# Patient Record
Sex: MALE | Race: WHITE | NOT HISPANIC OR LATINO | Employment: OTHER | URBAN - METROPOLITAN AREA
[De-identification: names, ages, dates, MRNs, and addresses within clinical notes are randomized per-mention and may not be internally consistent; named-entity substitution may affect disease eponyms.]

---

## 2023-07-28 LAB
ALANINE AMINOTRANSFERASE (SGPT) (U/L) IN SER/PLAS: 17 U/L (ref 10–52)
ALBUMIN (G/DL) IN SER/PLAS: 4.3 G/DL (ref 3.4–5)
ALKALINE PHOSPHATASE (U/L) IN SER/PLAS: 89 U/L (ref 33–136)
AMYLASE (U/L) IN SER/PLAS: 60 U/L (ref 29–103)
ANION GAP IN SER/PLAS: 18 MMOL/L (ref 10–20)
ASPARTATE AMINOTRANSFERASE (SGOT) (U/L) IN SER/PLAS: 20 U/L (ref 9–39)
BASOPHILS (10*3/UL) IN BLOOD BY AUTOMATED COUNT: 0.11 X10E9/L (ref 0–0.1)
BASOPHILS/100 LEUKOCYTES IN BLOOD BY AUTOMATED COUNT: 0.8 % (ref 0–2)
BILIRUBIN TOTAL (MG/DL) IN SER/PLAS: 0.7 MG/DL (ref 0–1.2)
CALCIDIOL (25 OH VITAMIN D3) (NG/ML) IN SER/PLAS: 50 NG/ML
CALCIUM (MG/DL) IN SER/PLAS: 9.3 MG/DL (ref 8.6–10.6)
CARBON DIOXIDE, TOTAL (MMOL/L) IN SER/PLAS: 24 MMOL/L (ref 21–32)
CHLORIDE (MMOL/L) IN SER/PLAS: 102 MMOL/L (ref 98–107)
CHOLESTEROL (MG/DL) IN SER/PLAS: 180 MG/DL (ref 0–199)
CHOLESTEROL IN HDL (MG/DL) IN SER/PLAS: 38.1 MG/DL
CHOLESTEROL/HDL RATIO: 4.7
CREATINE KINASE (U/L) IN SER/PLAS: 82 U/L (ref 0–325)
CREATININE (MG/DL) IN SER/PLAS: 1.99 MG/DL (ref 0.5–1.3)
EOSINOPHILS (10*3/UL) IN BLOOD BY AUTOMATED COUNT: 0.37 X10E9/L (ref 0–0.4)
EOSINOPHILS/100 LEUKOCYTES IN BLOOD BY AUTOMATED COUNT: 2.6 % (ref 0–6)
ERYTHROCYTE DISTRIBUTION WIDTH (RATIO) BY AUTOMATED COUNT: 13 % (ref 11.5–14.5)
ERYTHROCYTE MEAN CORPUSCULAR HEMOGLOBIN CONCENTRATION (G/DL) BY AUTOMATED: 33.3 G/DL (ref 32–36)
ERYTHROCYTE MEAN CORPUSCULAR VOLUME (FL) BY AUTOMATED COUNT: 97 FL (ref 80–100)
ERYTHROCYTES (10*6/UL) IN BLOOD BY AUTOMATED COUNT: 4.44 X10E12/L (ref 4.5–5.9)
ESTIMATED AVERAGE GLUCOSE FOR HBA1C: 177 MG/DL
GAMMA GLUTAMYL TRANSFERASE (U/L) IN SER/PLAS: 29 U/L (ref 5–64)
GFR MALE: 33 ML/MIN/1.73M2
GLUCOSE (MG/DL) IN SER/PLAS: 211 MG/DL (ref 74–99)
HEMATOCRIT (%) IN BLOOD BY AUTOMATED COUNT: 43 % (ref 41–52)
HEMOGLOBIN (G/DL) IN BLOOD: 14.3 G/DL (ref 13.5–17.5)
HEMOGLOBIN A1C/HEMOGLOBIN TOTAL IN BLOOD: 7.8 %
IMMATURE GRANULOCYTES/100 LEUKOCYTES IN BLOOD BY AUTOMATED COUNT: 0.3 % (ref 0–0.9)
LDL: 95 MG/DL (ref 0–99)
LEUKOCYTES (10*3/UL) IN BLOOD BY AUTOMATED COUNT: 14.5 X10E9/L (ref 4.4–11.3)
LIPASE (U/L) IN SER/PLAS: 55 U/L (ref 9–82)
LYMPHOCYTES (10*3/UL) IN BLOOD BY AUTOMATED COUNT: 6.3 X10E9/L (ref 0.8–3)
LYMPHOCYTES/100 LEUKOCYTES IN BLOOD BY AUTOMATED COUNT: 43.5 % (ref 13–44)
MONOCYTES (10*3/UL) IN BLOOD BY AUTOMATED COUNT: 0.97 X10E9/L (ref 0.05–0.8)
MONOCYTES/100 LEUKOCYTES IN BLOOD BY AUTOMATED COUNT: 6.7 % (ref 2–10)
NEUTROPHILS (10*3/UL) IN BLOOD BY AUTOMATED COUNT: 6.68 X10E9/L (ref 1.6–5.5)
NEUTROPHILS/100 LEUKOCYTES IN BLOOD BY AUTOMATED COUNT: 46.1 % (ref 40–80)
NON HDL CHOLESTEROL: 142 MG/DL
NRBC (PER 100 WBCS) BY AUTOMATED COUNT: 0 /100 WBC (ref 0–0)
PLATELETS (10*3/UL) IN BLOOD AUTOMATED COUNT: 229 X10E9/L (ref 150–450)
POTASSIUM (MMOL/L) IN SER/PLAS: 4.1 MMOL/L (ref 3.5–5.3)
PROTEIN TOTAL: 7.3 G/DL (ref 6.4–8.2)
SODIUM (MMOL/L) IN SER/PLAS: 140 MMOL/L (ref 136–145)
THYROTROPIN (MIU/L) IN SER/PLAS BY DETECTION LIMIT <= 0.05 MIU/L: 3.84 MIU/L (ref 0.44–3.98)
THYROXINE (T4) FREE (NG/DL) IN SER/PLAS: 1.6 NG/DL (ref 0.78–1.48)
TRIGLYCERIDE (MG/DL) IN SER/PLAS: 236 MG/DL (ref 0–149)
UREA NITROGEN (MG/DL) IN SER/PLAS: 33 MG/DL (ref 6–23)
VLDL: 47 MG/DL (ref 0–40)

## 2023-08-16 LAB
ALANINE AMINOTRANSFERASE (SGPT) (U/L) IN SER/PLAS: 15 U/L (ref 10–52)
ALBUMIN (G/DL) IN SER/PLAS: 4.2 G/DL (ref 3.4–5)
ALKALINE PHOSPHATASE (U/L) IN SER/PLAS: 79 U/L (ref 33–136)
ANION GAP IN SER/PLAS: 17 MMOL/L (ref 10–20)
ASPARTATE AMINOTRANSFERASE (SGOT) (U/L) IN SER/PLAS: 18 U/L (ref 9–39)
BILIRUBIN TOTAL (MG/DL) IN SER/PLAS: 0.9 MG/DL (ref 0–1.2)
CALCIUM (MG/DL) IN SER/PLAS: 9.6 MG/DL (ref 8.6–10.6)
CARBON DIOXIDE, TOTAL (MMOL/L) IN SER/PLAS: 26 MMOL/L (ref 21–32)
CHLORIDE (MMOL/L) IN SER/PLAS: 103 MMOL/L (ref 98–107)
CREATININE (MG/DL) IN SER/PLAS: 1.96 MG/DL (ref 0.5–1.3)
ERYTHROCYTE DISTRIBUTION WIDTH (RATIO) BY AUTOMATED COUNT: 12.5 % (ref 11.5–14.5)
ERYTHROCYTE MEAN CORPUSCULAR HEMOGLOBIN CONCENTRATION (G/DL) BY AUTOMATED: 31.2 G/DL (ref 32–36)
ERYTHROCYTE MEAN CORPUSCULAR VOLUME (FL) BY AUTOMATED COUNT: 103 FL (ref 80–100)
ERYTHROCYTES (10*6/UL) IN BLOOD BY AUTOMATED COUNT: 4.03 X10E12/L (ref 4.5–5.9)
GFR MALE: 33 ML/MIN/1.73M2
GLUCOSE (MG/DL) IN SER/PLAS: 223 MG/DL (ref 74–99)
HEMATOCRIT (%) IN BLOOD BY AUTOMATED COUNT: 41.7 % (ref 41–52)
HEMOGLOBIN (G/DL) IN BLOOD: 13 G/DL (ref 13.5–17.5)
LEUKOCYTES (10*3/UL) IN BLOOD BY AUTOMATED COUNT: 12.4 X10E9/L (ref 4.4–11.3)
NRBC (PER 100 WBCS) BY AUTOMATED COUNT: 0 /100 WBC (ref 0–0)
PLATELETS (10*3/UL) IN BLOOD AUTOMATED COUNT: 196 X10E9/L (ref 150–450)
POTASSIUM (MMOL/L) IN SER/PLAS: 4.5 MMOL/L (ref 3.5–5.3)
PROTEIN TOTAL: 7.3 G/DL (ref 6.4–8.2)
SODIUM (MMOL/L) IN SER/PLAS: 141 MMOL/L (ref 136–145)
UREA NITROGEN (MG/DL) IN SER/PLAS: 36 MG/DL (ref 6–23)

## 2023-09-07 LAB
ALANINE AMINOTRANSFERASE (SGPT) (U/L) IN SER/PLAS: 24 U/L (ref 10–52)
ALBUMIN (G/DL) IN SER/PLAS: 4.3 G/DL (ref 3.4–5)
ALKALINE PHOSPHATASE (U/L) IN SER/PLAS: 85 U/L (ref 33–136)
ANION GAP IN SER/PLAS: 16 MMOL/L (ref 10–20)
ASPARTATE AMINOTRANSFERASE (SGOT) (U/L) IN SER/PLAS: 24 U/L (ref 9–39)
BASOPHILS (10*3/UL) IN BLOOD BY AUTOMATED COUNT: 0.1 X10E9/L (ref 0–0.1)
BASOPHILS/100 LEUKOCYTES IN BLOOD BY AUTOMATED COUNT: 0.8 % (ref 0–2)
BILIRUBIN TOTAL (MG/DL) IN SER/PLAS: 1 MG/DL (ref 0–1.2)
CALCIUM (MG/DL) IN SER/PLAS: 10.1 MG/DL (ref 8.6–10.6)
CARBON DIOXIDE, TOTAL (MMOL/L) IN SER/PLAS: 27 MMOL/L (ref 21–32)
CHLORIDE (MMOL/L) IN SER/PLAS: 103 MMOL/L (ref 98–107)
CREATININE (MG/DL) IN SER/PLAS: 2.21 MG/DL (ref 0.5–1.3)
EOSINOPHILS (10*3/UL) IN BLOOD BY AUTOMATED COUNT: 0.34 X10E9/L (ref 0–0.4)
EOSINOPHILS/100 LEUKOCYTES IN BLOOD BY AUTOMATED COUNT: 2.6 % (ref 0–6)
ERYTHROCYTE DISTRIBUTION WIDTH (RATIO) BY AUTOMATED COUNT: 12.8 % (ref 11.5–14.5)
ERYTHROCYTE MEAN CORPUSCULAR HEMOGLOBIN CONCENTRATION (G/DL) BY AUTOMATED: 33 G/DL (ref 32–36)
ERYTHROCYTE MEAN CORPUSCULAR VOLUME (FL) BY AUTOMATED COUNT: 97 FL (ref 80–100)
ERYTHROCYTES (10*6/UL) IN BLOOD BY AUTOMATED COUNT: 4.39 X10E12/L (ref 4.5–5.9)
GFR MALE: 29 ML/MIN/1.73M2
GLUCOSE (MG/DL) IN SER/PLAS: 195 MG/DL (ref 74–99)
HEMATOCRIT (%) IN BLOOD BY AUTOMATED COUNT: 42.7 % (ref 41–52)
HEMOGLOBIN (G/DL) IN BLOOD: 14.1 G/DL (ref 13.5–17.5)
IMMATURE GRANULOCYTES/100 LEUKOCYTES IN BLOOD BY AUTOMATED COUNT: 0.4 % (ref 0–0.9)
LEUKOCYTES (10*3/UL) IN BLOOD BY AUTOMATED COUNT: 13.2 X10E9/L (ref 4.4–11.3)
LYMPHOCYTES (10*3/UL) IN BLOOD BY AUTOMATED COUNT: 4.82 X10E9/L (ref 0.8–3)
LYMPHOCYTES/100 LEUKOCYTES IN BLOOD BY AUTOMATED COUNT: 36.4 % (ref 13–44)
MONOCYTES (10*3/UL) IN BLOOD BY AUTOMATED COUNT: 0.77 X10E9/L (ref 0.05–0.8)
MONOCYTES/100 LEUKOCYTES IN BLOOD BY AUTOMATED COUNT: 5.8 % (ref 2–10)
NEUTROPHILS (10*3/UL) IN BLOOD BY AUTOMATED COUNT: 7.15 X10E9/L (ref 1.6–5.5)
NEUTROPHILS/100 LEUKOCYTES IN BLOOD BY AUTOMATED COUNT: 54 % (ref 40–80)
NRBC (PER 100 WBCS) BY AUTOMATED COUNT: 0 /100 WBC (ref 0–0)
PLATELETS (10*3/UL) IN BLOOD AUTOMATED COUNT: 203 X10E9/L (ref 150–450)
POTASSIUM (MMOL/L) IN SER/PLAS: 4.8 MMOL/L (ref 3.5–5.3)
PROTEIN TOTAL: 7.6 G/DL (ref 6.4–8.2)
SEDIMENTATION RATE, ERYTHROCYTE: 15 MM/H (ref 0–20)
SODIUM (MMOL/L) IN SER/PLAS: 141 MMOL/L (ref 136–145)
UREA NITROGEN (MG/DL) IN SER/PLAS: 35 MG/DL (ref 6–23)

## 2023-11-13 DIAGNOSIS — I48.91 ATRIAL FIBRILLATION, UNSPECIFIED TYPE (MULTI): ICD-10-CM

## 2023-11-13 PROBLEM — Z95.1 S/P CABG X 2: Status: ACTIVE | Noted: 2023-11-13

## 2023-11-13 PROBLEM — I20.0 ACCELERATING ANGINA (MULTI): Status: ACTIVE | Noted: 2023-11-13

## 2023-11-13 PROBLEM — N13.2 URETERAL STONE WITH HYDRONEPHROSIS: Status: ACTIVE | Noted: 2023-11-13

## 2023-11-13 PROBLEM — I10 HYPERTENSION: Status: ACTIVE | Noted: 2023-11-13

## 2023-11-13 PROBLEM — D64.9 ANEMIA: Status: ACTIVE | Noted: 2023-11-13

## 2023-11-13 PROBLEM — I25.10 CAD IN NATIVE ARTERY: Status: ACTIVE | Noted: 2023-11-13

## 2023-11-13 PROBLEM — E11.9 DIABETES (MULTI): Status: ACTIVE | Noted: 2023-11-13

## 2023-11-13 PROBLEM — R00.2 PALPITATIONS: Status: ACTIVE | Noted: 2023-11-13

## 2023-11-13 PROBLEM — R94.39 ABNORMAL STRESS ELECTROCARDIOGRAM TEST: Status: ACTIVE | Noted: 2023-11-13

## 2023-11-13 PROBLEM — R55 SYNCOPE: Status: ACTIVE | Noted: 2023-11-13

## 2023-11-13 PROBLEM — G47.9 SLEEP TROUBLE: Status: ACTIVE | Noted: 2023-11-13

## 2023-11-13 PROBLEM — E78.5 HLD (HYPERLIPIDEMIA): Status: ACTIVE | Noted: 2023-11-13

## 2023-11-13 PROBLEM — M17.11 PRIMARY LOCALIZED OSTEOARTHROSIS OF RIGHT LOWER LEG: Status: ACTIVE | Noted: 2023-11-13

## 2023-11-13 PROBLEM — K21.9 GERD (GASTROESOPHAGEAL REFLUX DISEASE): Status: ACTIVE | Noted: 2023-11-13

## 2023-11-13 RX ORDER — LATANOPROST 50 UG/ML
1 SOLUTION/ DROPS OPHTHALMIC NIGHTLY
COMMUNITY
Start: 2018-09-17 | End: 2024-04-01 | Stop reason: ALTCHOICE

## 2023-11-13 RX ORDER — ATORVASTATIN CALCIUM 40 MG/1
40 TABLET, FILM COATED ORAL DAILY
COMMUNITY
Start: 2023-09-15

## 2023-11-13 RX ORDER — FLUTICASONE PROPIONATE 44 UG/1
1 AEROSOL, METERED RESPIRATORY (INHALATION)
COMMUNITY
Start: 2021-04-21

## 2023-11-13 RX ORDER — CHOLECALCIFEROL (VITAMIN D3) 50 MCG
1 TABLET ORAL DAILY
COMMUNITY

## 2023-11-13 RX ORDER — ALBUTEROL SULFATE 0.83 MG/ML
2.5 SOLUTION RESPIRATORY (INHALATION) EVERY 6 HOURS PRN
COMMUNITY

## 2023-11-13 RX ORDER — OXYCODONE HYDROCHLORIDE 5 MG/1
TABLET ORAL EVERY 4 HOURS PRN
COMMUNITY
Start: 2023-04-11 | End: 2024-04-01 | Stop reason: ALTCHOICE

## 2023-11-13 RX ORDER — OMEPRAZOLE 20 MG/1
CAPSULE, DELAYED RELEASE ORAL
COMMUNITY

## 2023-11-13 RX ORDER — GLIPIZIDE 5 MG/1
5 TABLET, FILM COATED, EXTENDED RELEASE ORAL DAILY
COMMUNITY
Start: 2023-07-30

## 2023-11-13 RX ORDER — APIXABAN 2.5 MG/1
2.5 TABLET, FILM COATED ORAL 2 TIMES DAILY
Qty: 180 TABLET | Refills: 3 | Status: SHIPPED | OUTPATIENT
Start: 2023-11-13

## 2023-11-13 RX ORDER — TAMSULOSIN HYDROCHLORIDE 0.4 MG/1
CAPSULE ORAL DAILY
COMMUNITY
Start: 2021-05-01 | End: 2024-04-01 | Stop reason: ALTCHOICE

## 2023-11-13 RX ORDER — METOPROLOL SUCCINATE 25 MG/1
25 TABLET, EXTENDED RELEASE ORAL DAILY
COMMUNITY
Start: 2023-09-09

## 2023-11-13 RX ORDER — APIXABAN 2.5 MG/1
2.5 TABLET, FILM COATED ORAL 2 TIMES DAILY
COMMUNITY
Start: 2023-09-09 | End: 2023-11-13 | Stop reason: SDUPTHER

## 2023-11-13 RX ORDER — PANTOPRAZOLE SODIUM 40 MG/1
1 TABLET, DELAYED RELEASE ORAL DAILY
COMMUNITY
Start: 2018-09-17 | End: 2024-04-01 | Stop reason: ALTCHOICE

## 2023-11-13 RX ORDER — POLYETHYLENE GLYCOL 3350 17 G/17G
17 POWDER, FOR SOLUTION ORAL DAILY
COMMUNITY
Start: 2018-09-17 | End: 2024-04-01 | Stop reason: ALTCHOICE

## 2023-11-13 RX ORDER — METFORMIN HYDROCHLORIDE 500 MG/1
500 TABLET ORAL EVERY 12 HOURS
COMMUNITY
Start: 2017-12-21

## 2023-11-13 RX ORDER — HYDROCHLOROTHIAZIDE 25 MG/1
25 TABLET ORAL DAILY
COMMUNITY
Start: 2023-11-01

## 2023-11-13 RX ORDER — ACETAMINOPHEN 500 MG
TABLET ORAL EVERY 6 HOURS PRN
COMMUNITY

## 2024-03-29 ENCOUNTER — APPOINTMENT (OUTPATIENT)
Dept: CARDIOLOGY | Facility: CLINIC | Age: 84
End: 2024-03-29
Payer: MEDICARE

## 2024-03-29 PROBLEM — B35.1 ONYCHOMYCOSIS: Status: ACTIVE | Noted: 2019-03-26

## 2024-03-29 PROBLEM — D17.5 PROMINENT ILEOCECAL VALVE: Status: ACTIVE | Noted: 2017-12-27

## 2024-03-29 PROBLEM — N39.0 UTI (URINARY TRACT INFECTION): Status: ACTIVE | Noted: 2024-03-29

## 2024-03-29 PROBLEM — L97.329 ULCER OF LEFT ANKLE (MULTI): Status: ACTIVE | Noted: 2019-03-26

## 2024-03-29 PROBLEM — K57.30 DIVERTICULOSIS OF LARGE INTESTINE WITHOUT HEMORRHAGE: Status: ACTIVE | Noted: 2017-12-27

## 2024-03-29 PROBLEM — R06.2 WHEEZING: Status: ACTIVE | Noted: 2024-03-29

## 2024-03-29 PROBLEM — M25.539 WRIST PAIN: Status: ACTIVE | Noted: 2024-03-29

## 2024-03-29 PROBLEM — D72.829 ELEVATED WHITE BLOOD CELL COUNT: Status: ACTIVE | Noted: 2024-03-29

## 2024-03-29 PROBLEM — M25.561 PAIN IN RIGHT KNEE: Status: ACTIVE | Noted: 2024-03-29

## 2024-03-29 PROBLEM — K59.00 CONSTIPATION: Status: ACTIVE | Noted: 2024-03-29

## 2024-03-29 PROBLEM — Z86.0100 HISTORY OF COLONIC POLYPS: Status: ACTIVE | Noted: 2017-12-27

## 2024-03-29 PROBLEM — S52.502A DISTAL RADIUS FRACTURE, LEFT: Status: ACTIVE | Noted: 2024-03-29

## 2024-03-29 PROBLEM — M77.8 TENDINITIS OF RIGHT SHOULDER: Status: ACTIVE | Noted: 2024-03-29

## 2024-03-29 PROBLEM — Z86.010 HISTORY OF COLONIC POLYPS: Status: ACTIVE | Noted: 2017-12-27

## 2024-03-29 RX ORDER — LOSARTAN POTASSIUM 25 MG/1
25 TABLET ORAL DAILY
COMMUNITY
Start: 2023-08-29

## 2024-04-01 ENCOUNTER — OFFICE VISIT (OUTPATIENT)
Dept: CARDIOLOGY | Facility: CLINIC | Age: 84
End: 2024-04-01
Payer: MEDICARE

## 2024-04-01 VITALS
HEIGHT: 69 IN | HEART RATE: 83 BPM | DIASTOLIC BLOOD PRESSURE: 63 MMHG | SYSTOLIC BLOOD PRESSURE: 159 MMHG | WEIGHT: 197 LBS | OXYGEN SATURATION: 96 % | BODY MASS INDEX: 29.18 KG/M2

## 2024-04-01 DIAGNOSIS — I25.10 CAD IN NATIVE ARTERY: Primary | ICD-10-CM

## 2024-04-01 PROCEDURE — 93005 ELECTROCARDIOGRAM TRACING: CPT | Performed by: INTERNAL MEDICINE

## 2024-04-01 PROCEDURE — 1160F RVW MEDS BY RX/DR IN RCRD: CPT | Performed by: INTERNAL MEDICINE

## 2024-04-01 PROCEDURE — 3077F SYST BP >= 140 MM HG: CPT | Performed by: INTERNAL MEDICINE

## 2024-04-01 PROCEDURE — 3078F DIAST BP <80 MM HG: CPT | Performed by: INTERNAL MEDICINE

## 2024-04-01 PROCEDURE — 1159F MED LIST DOCD IN RCRD: CPT | Performed by: INTERNAL MEDICINE

## 2024-04-01 PROCEDURE — 99213 OFFICE O/P EST LOW 20 MIN: CPT | Performed by: INTERNAL MEDICINE

## 2024-04-01 PROCEDURE — 93010 ELECTROCARDIOGRAM REPORT: CPT | Performed by: INTERNAL MEDICINE

## 2024-04-01 PROCEDURE — 1036F TOBACCO NON-USER: CPT | Performed by: INTERNAL MEDICINE

## 2024-04-01 PROCEDURE — 1126F AMNT PAIN NOTED NONE PRSNT: CPT | Performed by: INTERNAL MEDICINE

## 2024-04-01 ASSESSMENT — PATIENT HEALTH QUESTIONNAIRE - PHQ9
1. LITTLE INTEREST OR PLEASURE IN DOING THINGS: NOT AT ALL
2. FEELING DOWN, DEPRESSED OR HOPELESS: NOT AT ALL
SUM OF ALL RESPONSES TO PHQ9 QUESTIONS 1 AND 2: 0

## 2024-04-01 ASSESSMENT — ENCOUNTER SYMPTOMS
LOSS OF SENSATION IN FEET: 1
OCCASIONAL FEELINGS OF UNSTEADINESS: 1
DEPRESSION: 0

## 2024-04-01 ASSESSMENT — COLUMBIA-SUICIDE SEVERITY RATING SCALE - C-SSRS
2. HAVE YOU ACTUALLY HAD ANY THOUGHTS OF KILLING YOURSELF?: NO
1. IN THE PAST MONTH, HAVE YOU WISHED YOU WERE DEAD OR WISHED YOU COULD GO TO SLEEP AND NOT WAKE UP?: NO
6. HAVE YOU EVER DONE ANYTHING, STARTED TO DO ANYTHING, OR PREPARED TO DO ANYTHING TO END YOUR LIFE?: NO

## 2024-04-01 ASSESSMENT — PAIN SCALES - GENERAL: PAINLEVEL: 0-NO PAIN

## 2024-04-01 NOTE — PROGRESS NOTES
Primary Care Physician: Julio Diaz MD  Date of Visit: 04/01/2024  2:20 PM EDT  Location of visit: [facility] [address]     Chief Complaint:   Chief Complaint   Patient presents with    Follow-up        HPI / Summary:   Mauro Grande is a 84 y.o. male presents for followup.     6-month office follow-up evaluation.  September 2018 off-pump LIMA to LAD diagonal bypass, type 2 diabetes, CKD, last year mechanical fall with ankle injury, paroxysmal atrial fibrillation on Eliquis    Specialty Problems          Cardiology Problems    Abnormal stress electrocardiogram test    Accelerating angina (CMS/HCC)    CAD in native artery    HLD (hyperlipidemia)    Hypertension    New onset a-fib (CMS/HCC)    Palpitations    S/P CABG x 2        No past medical history on file.       Past Surgical History:   Procedure Laterality Date    MR HEAD ANGIO WO IV CONTRAST  10/23/2016    MR HEAD ANGIO WO IV CONTRAST 10/23/2016 [facility] ANCILLARY LEGACY          Social History:         Allergies:  Not on File    Outpatient Medications:  Current Outpatient Medications   Medication Instructions    acetaminophen (Tylenol) 500 mg tablet oral, Every 6 hours PRN    albuterol 2.5 mg, inhalation, Every 6 hours PRN    atorvastatin (LIPITOR) 40 mg, oral, Daily    cholecalciferol (Vitamin D-3) 50 MCG (2000 UT) tablet 1 tablet, oral, Daily    Eliquis 2.5 mg, oral, 2 times daily    fluticasone (Flovent HFA) 44 mcg/actuation inhaler 1 puff, inhalation, 2 times daily RT    glipiZIDE XL (GLUCOTROL XL) 5 mg, oral, Daily    hydroCHLOROthiazide (HYDRODIURIL) 25 mg, oral, Daily    latanoprost (Xalatan) 0.005 % ophthalmic solution 1 drop, Both Eyes, Nightly    losartan (COZAAR) 25 mg, oral, Daily    metFORMIN (GLUCOPHAGE) 500 mg, oral, Every 12 hours    metoprolol succinate XL (TOPROL-XL) 25 mg, oral, Daily    omeprazole (PriLOSEC) 20 mg DR capsule oral, Daily before breakfast    oxyCODONE (Roxicodone) 5 mg immediate release tablet oral, Every 4 hours PRN     pantoprazole (ProtoNix) 40 mg EC tablet 1 tablet, oral, Daily    polyethylene glycol (GLYCOLAX, MIRALAX) 17 g, oral, Daily    tamsulosin (Flomax) 0.4 mg 24 hr capsule oral, Daily       ROS     Physical Exam:  There were no vitals filed for this visit.  Wt Readings from Last 5 Encounters:   09/29/23 93.5 kg (206 lb 2 oz)   03/24/23 94.9 kg (209 lb 2 oz)   09/07/22 96.7 kg (213 lb 4 oz)   03/04/22 94.5 kg (208 lb 7 oz)   07/09/21 92.3 kg (203 lb 8 oz)     There is no height or weight on file to calculate BMI.     Well-developed well-nourished male.  Flat JVP.  Normal carotid upstrokes no bruits.  Regular rhythm no gallop.  Clear lungs.  Soft abdomen.  No dependent edema with intact pedal pulses  Last Labs:  CMP:  Recent Labs     09/07/23  1208 08/16/23  1210 07/28/23  1126 04/11/23  0611 04/10/23  1104    141 140 137 135*   K 4.8 4.5 4.1 3.8 4.1    103 102 102 99   CO2 27 26 24 25 23   ANIONGAP 16 17 18 14 17   BUN 35* 36* 33* 37* 38*   CREATININE 2.21* 1.96* 1.99* 1.71* 1.74*   GLUCOSE 195* 223* 211* 194* 287*     Recent Labs     09/07/23  1208 08/16/23  1210 07/28/23  1126 04/11/23  0611 04/05/23  1948 08/31/22  1450 06/16/22  1139 05/26/22  1357 05/06/22  1251 05/13/21  1451 04/28/21  2205 06/24/20  1310 05/07/20  1059   ALBUMIN 4.3 4.2 4.3 3.2* 3.8   < > 3.9   < > 3.9   < > 4.1   < > 4.3   ALKPHOS 85 79 89 67 81   < > 85   < > 106   < > 98   < > 98   ALT 24 15 17 27 22   < > 22   < > 25   < > 20   < > 30   AST 24 18 20 23 19   < > 21   < > 27   < > 20   < > 31   BILITOT 1.0 0.9 0.7 0.9 1.1   < > 0.7   < > 1.0   < > 0.8   < > 1.0   LIPASE  --   --  55  --   --   --  49  --  82  --  85*  --  63    < > = values in this interval not displayed.     CBC:  Recent Labs     09/07/23  1208 08/16/23  1210 07/28/23  1126 04/11/23  0611 04/10/23  1104   WBC 13.2* 12.4* 14.5* 10.8 10.3   HGB 14.1 13.0* 14.3 11.8* 13.1*   HCT 42.7 41.7 43.0 36.5* 38.4*    196 229 212 244   MCV 97 103* 97 97 94     COAG:    Recent Labs     04/05/23 1948 04/28/21  2205 09/19/18  0942 09/18/18  0627 09/17/18  0617   INR 1.2* 1.2* 1.1 1.0 1.0     HEME/ENDO:  Recent Labs     07/28/23  1126 06/16/22  1139 05/06/22  1251 04/29/21  1207 03/08/19  1214 09/17/18  1830 09/15/18  0246   IRONSAT  --   --   --   --   --   --  16*   TSH 3.84 3.33 1.85  --   --   --   --    HGBA1C 7.8*  --  8.0* 7.8 6.9   < >  --     < > = values in this interval not displayed.      CARDIAC:   Recent Labs     04/05/23 1948   TROPHS 11     Recent Labs     07/28/23  1126 05/06/22  1251 03/08/19  1214 09/15/18  0246   CHOL 180 162 154  --    LDLF 95 99  --   --    HDL 38.1* 34.4* 38.8*  --    TRIG 236* 143  --  121       Last Cardiology Tests:  ECG:  Normal record    Echo:  Echo Results:  No results found for this or any previous visit from the past 3650 days.       Cath:      Stress Test:  Stress Results:  No results found for this or any previous visit from the past 365 days.         Cardiac Imaging:  XR ankle  Narrative: Interpreted By:  CHINYERE JAIMES MD  MRN: [id number]  Patient Name: KEERTHI VAN     STUDY:  ANKLE, COMPLETE, MIN 3 VIEWS;  6/14/2023 2:51 pm     INDICATION:  pain  S82.891A: Ankle fracture, right.     COMPARISON:  None.     ACCESSION NUMBER(S):  [id number]     ORDERING CLINICIAN:  PACHECO RUFF     FINDINGS:  No acute fracture is identified. There is a healing fracture  involving the medial malleolus. No dislocation is seen. There are no  lytic or blastic lesions. No radiopaque foreign bodies are noted.  Ankle mortise is maintained. There is osteopenia. There is  degenerative spurring. There is a prominent enthesophyte at the  insertion of the Achilles tendon. There is a small calcaneal spur.     Impression: Healing fracture of the medial malleolus. Osteopenia. Degenerate  changes.        Assessment/Plan       84-year-old male with a single-vessel bypass LIMA to LAD to the and to the diagonal in 2018, type 2 diabetes, hypertension, dyslipidemia,  PAF on Eliquis.  Recent mechanical fall with ankle injury ambulates now with a walker.  Clinically stable no change in drug treatment advised with office follow-up scheduled in 6 more months  Orders:  Orders Placed This Encounter   Procedures    ECG 12 lead (Clinic Performed)      Followup Appts:  No future appointments.        ____________________________________________________________  Smith Aden MD    Senior Attending Physician  [facility]  [facility]

## 2024-04-04 LAB
ATRIAL RATE: 83 BPM
P AXIS: 49 DEGREES
P OFFSET: 207 MS
P ONSET: 150 MS
PR INTERVAL: 150 MS
Q ONSET: 225 MS
QRS COUNT: 13 BEATS
QRS DURATION: 82 MS
QT INTERVAL: 390 MS
QTC CALCULATION(BAZETT): 458 MS
QTC FREDERICIA: 434 MS
R AXIS: -2 DEGREES
T AXIS: 54 DEGREES
T OFFSET: 420 MS
VENTRICULAR RATE: 83 BPM

## 2024-06-11 DIAGNOSIS — E78.5 HYPERLIPIDEMIA, UNSPECIFIED HYPERLIPIDEMIA TYPE: ICD-10-CM

## 2024-06-11 RX ORDER — ATORVASTATIN CALCIUM 40 MG/1
40 TABLET, FILM COATED ORAL DAILY
Qty: 90 TABLET | Refills: 3 | Status: SHIPPED | OUTPATIENT
Start: 2024-06-11

## 2024-07-15 ENCOUNTER — APPOINTMENT (OUTPATIENT)
Dept: PRIMARY CARE | Facility: CLINIC | Age: 84
End: 2024-07-15
Payer: MEDICARE

## 2024-07-15 ENCOUNTER — LAB (OUTPATIENT)
Dept: LAB | Facility: LAB | Age: 84
End: 2024-07-15
Payer: MEDICARE

## 2024-07-15 VITALS
DIASTOLIC BLOOD PRESSURE: 71 MMHG | HEIGHT: 69 IN | HEART RATE: 114 BPM | BODY MASS INDEX: 31.31 KG/M2 | WEIGHT: 211.4 LBS | OXYGEN SATURATION: 98 % | SYSTOLIC BLOOD PRESSURE: 136 MMHG

## 2024-07-15 DIAGNOSIS — E78.2 MIXED HYPERLIPIDEMIA: ICD-10-CM

## 2024-07-15 DIAGNOSIS — N18.32 STAGE 3B CHRONIC KIDNEY DISEASE (MULTI): ICD-10-CM

## 2024-07-15 DIAGNOSIS — I10 PRIMARY HYPERTENSION: ICD-10-CM

## 2024-07-15 DIAGNOSIS — I48.11 LONGSTANDING PERSISTENT ATRIAL FIBRILLATION (MULTI): ICD-10-CM

## 2024-07-15 DIAGNOSIS — E11.69 TYPE 2 DIABETES MELLITUS WITH OTHER SPECIFIED COMPLICATION, WITHOUT LONG-TERM CURRENT USE OF INSULIN (MULTI): ICD-10-CM

## 2024-07-15 DIAGNOSIS — R39.89 SUSPECTED URINARY TRACT INFECTION: ICD-10-CM

## 2024-07-15 DIAGNOSIS — Z76.89 ENCOUNTER TO ESTABLISH CARE: Primary | ICD-10-CM

## 2024-07-15 PROBLEM — L97.329 ULCER OF LEFT ANKLE (MULTI): Status: RESOLVED | Noted: 2019-03-26 | Resolved: 2024-07-15

## 2024-07-15 LAB
25(OH)D3 SERPL-MCNC: 35 NG/ML (ref 30–100)
ANION GAP SERPL CALC-SCNC: 14 MMOL/L (ref 10–20)
APPEARANCE UR: CLEAR
BILIRUB UR STRIP.AUTO-MCNC: NEGATIVE MG/DL
BUN SERPL-MCNC: 34 MG/DL (ref 6–23)
CALCIUM SERPL-MCNC: 8.9 MG/DL (ref 8.6–10.3)
CHLORIDE SERPL-SCNC: 100 MMOL/L (ref 98–107)
CHOLEST SERPL-MCNC: 140 MG/DL (ref 0–199)
CHOLESTEROL/HDL RATIO: 3.4
CO2 SERPL-SCNC: 26 MMOL/L (ref 21–32)
COLOR UR: ABNORMAL
CREAT SERPL-MCNC: 1.97 MG/DL (ref 0.5–1.3)
EGFRCR SERPLBLD CKD-EPI 2021: 33 ML/MIN/1.73M*2
ERYTHROCYTE [DISTWIDTH] IN BLOOD BY AUTOMATED COUNT: 13 % (ref 11.5–14.5)
EST. AVERAGE GLUCOSE BLD GHB EST-MCNC: 214 MG/DL
GLUCOSE SERPL-MCNC: 280 MG/DL (ref 74–99)
GLUCOSE UR STRIP.AUTO-MCNC: ABNORMAL MG/DL
HBA1C MFR BLD: 9.1 %
HCT VFR BLD AUTO: 39.5 % (ref 41–52)
HDLC SERPL-MCNC: 41.6 MG/DL
HGB BLD-MCNC: 13.1 G/DL (ref 13.5–17.5)
KETONES UR STRIP.AUTO-MCNC: NEGATIVE MG/DL
LDLC SERPL CALC-MCNC: 63 MG/DL
LEUKOCYTE ESTERASE UR QL STRIP.AUTO: NEGATIVE
MCH RBC QN AUTO: 31.6 PG (ref 26–34)
MCHC RBC AUTO-ENTMCNC: 33.2 G/DL (ref 32–36)
MCV RBC AUTO: 95 FL (ref 80–100)
NITRITE UR QL STRIP.AUTO: NEGATIVE
NON HDL CHOLESTEROL: 98 MG/DL (ref 0–149)
NRBC BLD-RTO: 0 /100 WBCS (ref 0–0)
PH UR STRIP.AUTO: 5 [PH]
PLATELET # BLD AUTO: 213 X10*3/UL (ref 150–450)
POTASSIUM SERPL-SCNC: 3.8 MMOL/L (ref 3.5–5.3)
PROT UR STRIP.AUTO-MCNC: NEGATIVE MG/DL
PTH-INTACT SERPL-MCNC: 153.6 PG/ML (ref 18.5–88)
RBC # BLD AUTO: 4.15 X10*6/UL (ref 4.5–5.9)
RBC # UR STRIP.AUTO: NEGATIVE /UL
SODIUM SERPL-SCNC: 136 MMOL/L (ref 136–145)
SP GR UR STRIP.AUTO: 1.02
TRIGL SERPL-MCNC: 178 MG/DL (ref 0–149)
TSH SERPL-ACNC: 2.23 MIU/L (ref 0.44–3.98)
URATE SERPL-MCNC: 6.2 MG/DL (ref 4–7.5)
UROBILINOGEN UR STRIP.AUTO-MCNC: NORMAL MG/DL
VLDL: 36 MG/DL (ref 0–40)
WBC # BLD AUTO: 16.6 X10*3/UL (ref 4.4–11.3)

## 2024-07-15 PROCEDURE — 84443 ASSAY THYROID STIM HORMONE: CPT

## 2024-07-15 PROCEDURE — 81003 URINALYSIS AUTO W/O SCOPE: CPT

## 2024-07-15 PROCEDURE — 1159F MED LIST DOCD IN RCRD: CPT | Performed by: STUDENT IN AN ORGANIZED HEALTH CARE EDUCATION/TRAINING PROGRAM

## 2024-07-15 PROCEDURE — 1160F RVW MEDS BY RX/DR IN RCRD: CPT | Performed by: STUDENT IN AN ORGANIZED HEALTH CARE EDUCATION/TRAINING PROGRAM

## 2024-07-15 PROCEDURE — 80048 BASIC METABOLIC PNL TOTAL CA: CPT

## 2024-07-15 PROCEDURE — 36415 COLL VENOUS BLD VENIPUNCTURE: CPT

## 2024-07-15 PROCEDURE — 85027 COMPLETE CBC AUTOMATED: CPT

## 2024-07-15 PROCEDURE — 3078F DIAST BP <80 MM HG: CPT | Performed by: STUDENT IN AN ORGANIZED HEALTH CARE EDUCATION/TRAINING PROGRAM

## 2024-07-15 PROCEDURE — 84550 ASSAY OF BLOOD/URIC ACID: CPT

## 2024-07-15 PROCEDURE — 87086 URINE CULTURE/COLONY COUNT: CPT

## 2024-07-15 PROCEDURE — 1036F TOBACCO NON-USER: CPT | Performed by: STUDENT IN AN ORGANIZED HEALTH CARE EDUCATION/TRAINING PROGRAM

## 2024-07-15 PROCEDURE — 99204 OFFICE O/P NEW MOD 45 MIN: CPT | Performed by: STUDENT IN AN ORGANIZED HEALTH CARE EDUCATION/TRAINING PROGRAM

## 2024-07-15 PROCEDURE — 3075F SYST BP GE 130 - 139MM HG: CPT | Performed by: STUDENT IN AN ORGANIZED HEALTH CARE EDUCATION/TRAINING PROGRAM

## 2024-07-15 PROCEDURE — 80061 LIPID PANEL: CPT

## 2024-07-15 PROCEDURE — 83970 ASSAY OF PARATHORMONE: CPT

## 2024-07-15 PROCEDURE — 82306 VITAMIN D 25 HYDROXY: CPT

## 2024-07-15 PROCEDURE — 83036 HEMOGLOBIN GLYCOSYLATED A1C: CPT

## 2024-07-15 NOTE — PROGRESS NOTES
"Subjective   Patient ID: Mauro Grande is a 84 y.o. male who presents for New Patient Visit (Establish care. Possible UTI. ).        HPI    Est care   Pt's PMH, PSH, SH, FH , meds and allergies was obtained / reviewed and updated .     HTN    DM2   CAD s.p cabg in 2018     CKD stage 4 /3b  Pt not aware       Afib   On bb and ac     Suspected uti due to fatigue   Increased frequency of urination   No change in color/ hematuria   No dysuria           HE DROVE HIMSELF TO THE OFFICE TODAY      Visit Vitals  /71   Pulse (!) 114   Ht 1.753 m (5' 9\")   Wt 95.9 kg (211 lb 6.4 oz)   SpO2 98%   BMI 31.22 kg/m²   Smoking Status Never   BSA 2.16 m²      No LMP for male patient.   Current Outpatient Medications   Medication Instructions    atorvastatin (LIPITOR) 40 mg, oral, Daily    cholecalciferol (Vitamin D-3) 50 MCG (2000 UT) tablet 1 tablet, oral, Daily    Eliquis 2.5 mg, oral, 2 times daily    glipiZIDE XL (GLUCOTROL XL) 5 mg, oral, Daily, Take two in the morning one in the afternoon and one at bedtime    hydroCHLOROthiazide (HYDRODIURIL) 25 mg, oral, Daily    losartan (COZAAR) 25 mg, oral, Daily    metFORMIN (GLUCOPHAGE) 500 mg, oral, Every 12 hours    metoprolol succinate XL (TOPROL-XL) 25 mg, oral, Daily    omeprazole (PriLOSEC) 20 mg DR capsule oral, Daily before breakfast      Social History     Tobacco Use    Smoking status: Never    Smokeless tobacco: Never   Substance Use Topics    Alcohol use: Yes     Comment: Occasionally        Review of Systems    Constitutional : No feeling poorly / fevers/ chills / night sweats/ fatigue   Cardiovascular : No CP /Palpitations/ lower extremity edema / syncope   Respiratory : No Cough /VARGAS/Dyspnea at rest   Gastrointestinal : No abd pain / N/V  No bloody stools/ melena / constipation  Endo : No polyuria/polydipsia/ muscle weakness / sluggishness   CNS: No confusion / HA/ tingling/ numbness/ weakness of extremities  Psychiatric: No anxiety/ depression/ SI/HI    All other " systems have been reviewed and are negative for complaint       Physical Exam    Constitutional : Vitals reviewed. Alert and in no distress  Cardiovascular : Irregularly irregular rhythm, Normal S1, S2, No pericardial rub/ gallop, no peripheral edema   Pulmonary: No respiratory distress, CTAB   MSK : Normal gait and station , strength and tone   Skin: Warm to touch ,  normal skin turgor   Neurologic : CNs 2-12 grossly intact , no obvious FNDs  Psych : A,Ox3, normal mood and affect      Assessment/Plan   Diagnoses and all orders for this visit:  Stage 3b chronic kidney disease (Multi)  -     CBC; Future  -     Basic Metabolic Panel; Future  -     Vitamin D 25-Hydroxy,Total (for eval of Vitamin D levels); Future  -     Parathyroid Hormone, Intact; Future  -     Referral to Nephrology; Future  -     Uric Acid; Future  Longstanding persistent atrial fibrillation (Multi)  Encounter to establish care  Primary hypertension  Mixed hyperlipidemia  -     TSH with reflex to Free T4 if abnormal; Future  -     Lipid Panel; Future  Type 2 diabetes mellitus with other specified complication, without long-term current use of insulin (Multi)  -     Hemoglobin A1C; Future  Suspected urinary tract infection  -     Urinalysis with Reflex Microscopic; Future  -     Urine Culture; Future        84-year-old male with hypertension, hyperlipidemia, coronary artery disease s/p CABG in 2018, type 2 diabetes, CKD stage IIIb/IV, A-fib presents to establish care.    Chronic medical conditions: Reviewed , assessed .  CKD stage IIIb/IV discussed  With patient.  Repeat labs to monitor.  Nephrology referral placed.      Diabetes type 2: Due for A1c.    A-fib: On anticoagulation as well as rate controlled.  Repeat heart rate was within normal limits on auscultation .    Hypertension: At goal.    Hyperlipidemia: On statin.     Absence of LUTS, urinalysis and urine culture ordered.  Worsening CKD as a possible cause of fatigue discussed as well.      Patient advised to return to the office and 2 to 3 months          Conditions addressed and mgmt as noted above.  Pertinent labs, images/ imaging reports , chart review was done .   Age appropriate labs / labs for mgmt of chronic medical conditions ordered, further mgmt pending the results.

## 2024-07-15 NOTE — PROGRESS NOTES
"Subjective   Patient ID: Mauro Grande is a 84 y.o. male who presents for New Patient Visit (Establish care. Possible UTI. ).    HPI   Mauro Grande is an 85 y/o male who presents for a new patient visit to establish care. His acute complaints today are of frequent UTIs for the past few years as well as fatigue for the past few days (which he attributes to having many visitors over this weekend). In regards to the UTI, he states that he wears \"diapers\" overnight and has been waking up in a soaked diaper. No pain with urination, the color isn't too deep yellow. He felt this way last April and tripped over the carpet and had three fractures.     Medical History  Deviated septum repair back in 1979.   Bypass surgery    Family History  Only child with mother passing away in her 60s and father at 82    Social History  Denies smoking (other than pipes)  \"Occasional\" drinker    Review of Systems    Objective   /71   Pulse (!) 114   Ht 1.753 m (5' 9\")   Wt 95.9 kg (211 lb 6.4 oz)   SpO2 98%   BMI 31.22 kg/m²     Physical Exam  Constitutional:       General: He is not in acute distress.     Appearance: Normal appearance.   HENT:      Head: Normocephalic and atraumatic.   Cardiovascular:      Rate and Rhythm: Regular rhythm.      Comments: On auscultation, sounds are irregularly regular.  Pulmonary:      Effort: Pulmonary effort is normal.      Breath sounds: Normal breath sounds.   Musculoskeletal:      Left lower leg: Edema present.   Neurological:      General: No focal deficit present.      Mental Status: He is alert. Mental status is at baseline.      Gait: Gait abnormal.   Psychiatric:         Mood and Affect: Mood normal.         Behavior: Behavior normal.         Thought Content: Thought content normal.         Judgment: Judgment normal.         Assessment/Plan     This is an 85 y/o male presenting today for a new patient visit to establish care with concerns of a possible \"UTI and fatigue\". Given this " patient's medical history of a stage 4 chronic kidney disease, the UTI symptoms that the patient is experiencing may stem from his CKD rather than a UTI given the absence of pain on urination. Given that this patient is also establishing care for the first time, the appropriate set of bloodwork has been ordered, as seen below.    Problem List Items Addressed This Visit             ICD-10-CM    Hypertension I10    HLD (hyperlipidemia) E78.5    Relevant Orders    TSH with reflex to Free T4 if abnormal    Lipid Panel    Diabetes (Multi) E11.9    Relevant Orders    Hemoglobin A1C    Stage 3b chronic kidney disease (Multi) N18.32    Relevant Orders    CBC    Basic Metabolic Panel    Vitamin D 25-Hydroxy,Total (for eval of Vitamin D levels)    Parathyroid Hormone, Intact    Referral to Nephrology    Uric Acid     Other Visit Diagnoses         Codes    Encounter to establish care    -  Primary Z76.89    Longstanding persistent atrial fibrillation (Multi)     I48.11    Suspected urinary tract infection     R39.89    Relevant Orders    Urinalysis with Reflex Microscopic    Urine Culture

## 2024-07-17 LAB — BACTERIA UR CULT: NORMAL

## 2024-07-31 DIAGNOSIS — E11.69 TYPE 2 DIABETES MELLITUS WITH OTHER SPECIFIED COMPLICATION, WITHOUT LONG-TERM CURRENT USE OF INSULIN (MULTI): Primary | ICD-10-CM

## 2024-08-20 DIAGNOSIS — I10 PRIMARY HYPERTENSION: Primary | ICD-10-CM

## 2024-08-20 DIAGNOSIS — I48.11 LONGSTANDING PERSISTENT ATRIAL FIBRILLATION (MULTI): ICD-10-CM

## 2024-08-20 RX ORDER — METOPROLOL SUCCINATE 25 MG/1
25 TABLET, EXTENDED RELEASE ORAL DAILY
Qty: 90 TABLET | Refills: 3 | Status: SHIPPED | OUTPATIENT
Start: 2024-08-20

## 2024-09-23 ENCOUNTER — APPOINTMENT (OUTPATIENT)
Dept: PRIMARY CARE | Facility: CLINIC | Age: 84
End: 2024-09-23
Payer: MEDICARE

## 2024-09-23 VITALS
SYSTOLIC BLOOD PRESSURE: 134 MMHG | BODY MASS INDEX: 30.27 KG/M2 | WEIGHT: 204.4 LBS | DIASTOLIC BLOOD PRESSURE: 78 MMHG | HEIGHT: 69 IN | OXYGEN SATURATION: 96 % | HEART RATE: 96 BPM

## 2024-09-23 DIAGNOSIS — Z12.83 SKIN EXAM FOR MALIGNANT NEOPLASM: ICD-10-CM

## 2024-09-23 DIAGNOSIS — N18.32 STAGE 3B CHRONIC KIDNEY DISEASE (MULTI): ICD-10-CM

## 2024-09-23 DIAGNOSIS — Z00.00 MEDICARE ANNUAL WELLNESS VISIT, SUBSEQUENT: ICD-10-CM

## 2024-09-23 DIAGNOSIS — Z12.5 SCREENING FOR PROSTATE CANCER: ICD-10-CM

## 2024-09-23 DIAGNOSIS — E11.69 TYPE 2 DIABETES MELLITUS WITH OTHER SPECIFIED COMPLICATION, WITHOUT LONG-TERM CURRENT USE OF INSULIN: ICD-10-CM

## 2024-09-23 DIAGNOSIS — Z95.1 S/P CABG X 2: Primary | ICD-10-CM

## 2024-09-23 DIAGNOSIS — Z23 NEED FOR INFLUENZA VACCINATION: ICD-10-CM

## 2024-09-23 PROCEDURE — G0008 ADMIN INFLUENZA VIRUS VAC: HCPCS | Performed by: STUDENT IN AN ORGANIZED HEALTH CARE EDUCATION/TRAINING PROGRAM

## 2024-09-23 PROCEDURE — 3078F DIAST BP <80 MM HG: CPT | Performed by: STUDENT IN AN ORGANIZED HEALTH CARE EDUCATION/TRAINING PROGRAM

## 2024-09-23 PROCEDURE — 1124F ACP DISCUSS-NO DSCNMKR DOCD: CPT | Performed by: STUDENT IN AN ORGANIZED HEALTH CARE EDUCATION/TRAINING PROGRAM

## 2024-09-23 PROCEDURE — 1160F RVW MEDS BY RX/DR IN RCRD: CPT | Performed by: STUDENT IN AN ORGANIZED HEALTH CARE EDUCATION/TRAINING PROGRAM

## 2024-09-23 PROCEDURE — G0439 PPPS, SUBSEQ VISIT: HCPCS | Performed by: STUDENT IN AN ORGANIZED HEALTH CARE EDUCATION/TRAINING PROGRAM

## 2024-09-23 PROCEDURE — 1036F TOBACCO NON-USER: CPT | Performed by: STUDENT IN AN ORGANIZED HEALTH CARE EDUCATION/TRAINING PROGRAM

## 2024-09-23 PROCEDURE — 99214 OFFICE O/P EST MOD 30 MIN: CPT | Performed by: STUDENT IN AN ORGANIZED HEALTH CARE EDUCATION/TRAINING PROGRAM

## 2024-09-23 PROCEDURE — 3075F SYST BP GE 130 - 139MM HG: CPT | Performed by: STUDENT IN AN ORGANIZED HEALTH CARE EDUCATION/TRAINING PROGRAM

## 2024-09-23 PROCEDURE — 1159F MED LIST DOCD IN RCRD: CPT | Performed by: STUDENT IN AN ORGANIZED HEALTH CARE EDUCATION/TRAINING PROGRAM

## 2024-09-23 PROCEDURE — 1170F FXNL STATUS ASSESSED: CPT | Performed by: STUDENT IN AN ORGANIZED HEALTH CARE EDUCATION/TRAINING PROGRAM

## 2024-09-23 PROCEDURE — 90662 IIV NO PRSV INCREASED AG IM: CPT | Performed by: STUDENT IN AN ORGANIZED HEALTH CARE EDUCATION/TRAINING PROGRAM

## 2024-09-23 ASSESSMENT — ACTIVITIES OF DAILY LIVING (ADL)
DRESSING: INDEPENDENT
MANAGING_FINANCES: INDEPENDENT
GROCERY_SHOPPING: INDEPENDENT
DOING_HOUSEWORK: INDEPENDENT
BATHING: INDEPENDENT
TAKING_MEDICATION: INDEPENDENT

## 2024-09-23 ASSESSMENT — PATIENT HEALTH QUESTIONNAIRE - PHQ9
SUM OF ALL RESPONSES TO PHQ9 QUESTIONS 1 AND 2: 0
2. FEELING DOWN, DEPRESSED OR HOPELESS: NOT AT ALL
1. LITTLE INTEREST OR PLEASURE IN DOING THINGS: NOT AT ALL

## 2024-09-23 NOTE — PROGRESS NOTES
"Subjective   Patient ID: Mauro Grande is a 84 y.o. male who presents for Follow-up (2 month follow-up. ).        HPI    84-year-old male with hypertension, hyperlipidemia, coronary artery disease s/p CABG in 2018, type 2 diabetes, CKD stage IIIb/IV, A-fib       Now on Jardiance 10mg   Reports Bgs are better  90days - 126      Visit Vitals  /78   Pulse 96   Ht 1.753 m (5' 9\")   Wt 92.7 kg (204 lb 6.4 oz)   SpO2 96%   BMI 30.18 kg/m²   Smoking Status Never   BSA 2.12 m²      No LMP for male patient.   Current Outpatient Medications   Medication Instructions    atorvastatin (LIPITOR) 40 mg, oral, Daily    cholecalciferol (Vitamin D-3) 50 MCG (2000 UT) tablet 1 tablet, oral, Daily    Eliquis 2.5 mg, oral, 2 times daily    empagliflozin (JARDIANCE) 10 mg, oral, Daily    glipiZIDE XL (GLUCOTROL XL) 5 mg, oral, Daily, Take two in the morning one in the afternoon and one at bedtime    hydroCHLOROthiazide (HYDRODIURIL) 25 mg, oral, Daily    losartan (COZAAR) 25 mg, oral, Daily    metFORMIN (GLUCOPHAGE) 500 mg, oral, Every 12 hours    metoprolol succinate XL (TOPROL-XL) 25 mg, oral, Daily    omeprazole (PriLOSEC) 20 mg DR capsule oral, Daily before breakfast      Social History     Tobacco Use    Smoking status: Never    Smokeless tobacco: Never   Substance Use Topics    Alcohol use: Yes     Comment: Occasionally        Review of Systems    Constitutional : No feeling poorly / fevers/ chills / night sweats/ fatigue   Cardiovascular : No CP /Palpitations/ lower extremity edema / syncope   Respiratory : No Cough /VARGAS/Dyspnea at rest   Gastrointestinal : No abd pain / N/V  No bloody stools/ melena / constipation  Endo : No polyuria/polydipsia/ muscle weakness / sluggishness   CNS: No confusion / HA/ tingling/ numbness/ weakness of extremities  Psychiatric: No anxiety/ depression/ SI/HI    All other systems have been reviewed and are negative for complaint       Physical Exam    Constitutional : Vitals reviewed. Alert and " in no distress  Cardiovascular : RRR, Normal S1, S2, No pericardial rub/ gallop, no peripheral edema   Pulmonary: No respiratory distress, CTAB   MSK : Normal gait and station , strength and tone     Neurologic : CNs 2-12 grossly intact , no obvious FNDs  Psych : A,Ox3, normal mood and affect      Assessment/Plan   Diagnoses and all orders for this visit:  S/P CABG x 2  Need for influenza vaccination  -     Flu vaccine, trivalent, preservative free, HIGH-DOSE, age 65y+ (Fluzone)  Stage 3b chronic kidney disease (Multi)  -     Basic Metabolic Panel; Future  Type 2 diabetes mellitus with other specified complication, without long-term current use of insulin (Multi)  -     Albumin-Creatinine Ratio, Urine Random; Future  -     Hemoglobin A1C; Future      84-year-old male with hypertension, hyperlipidemia, coronary artery disease s/p CABG in 2018, type 2 diabetes, CKD stage IIIb/IV, A-fib on Ac with eliquis    Rpt labs in Dec  , continue current meds    Rto in Dec      Conditions addressed and mgmt as noted above.  Pertinent labs, images/ imaging reports , chart review was done .   Age appropriate labs / labs for mgmt of chronic medical conditions ordered, further mgmt pending the results.       This note is intended for the physician writing it, as well as to communicate findings to other healthcare professionals. These notes use medical lexicon that may be misunderstood by non medical persons. Therefore, interpretations of medical notes and terminology should be approached with caution.

## 2024-09-23 NOTE — PROGRESS NOTES
Subjective   Reason for Visit: Mauro Grande is an 84 y.o. male here for a Medicare Wellness visit.     Past Medical, Surgical, and Family History reviewed and updated in chart.    Reviewed all medications by prescribing practitioner or clinical pharmacist (such as prescriptions, OTCs, herbal therapies and supplements) and documented in the medical record.    HPI    84-year-old male with hypertension, hyperlipidemia, coronary artery disease s/p CABG in 2018, type 2 diabetes, CKD stage IIIb/IV, A-fib         Patient Care Team:  Maggie Gutierrez MD as PCP - General (Family Medicine)     Current Outpatient Medications   Medication Instructions    atorvastatin (LIPITOR) 40 mg, oral, Daily    cholecalciferol (Vitamin D-3) 50 MCG (2000 UT) tablet 1 tablet, oral, Daily    Eliquis 2.5 mg, oral, 2 times daily    empagliflozin (JARDIANCE) 10 mg, oral, Daily    glipiZIDE XL (GLUCOTROL XL) 5 mg, oral, Daily, Take two in the morning one in the afternoon and one at bedtime    hydroCHLOROthiazide (HYDRODIURIL) 25 mg, oral, Daily    losartan (COZAAR) 25 mg, oral, Daily    metFORMIN (GLUCOPHAGE) 500 mg, oral, Every 12 hours    metoprolol succinate XL (TOPROL-XL) 25 mg, oral, Daily    omeprazole (PriLOSEC) 20 mg DR capsule oral, Daily before breakfast        Social History     Tobacco Use    Smoking status: Never    Smokeless tobacco: Never   Substance Use Topics    Alcohol use: Yes     Comment: Occasionally        Review of Systems  Constitutional: no chills, no fever and no night sweats.     Eyes: no blurred vision and no eyesight problems.     ENT: no hearing loss, no nasal congestion, no nasal discharge, no hoarseness and no sore throat.     Cardiovascular: no chest pain, no intermittent leg claudication, no lower extremity edema, no palpitations and no syncope.     Respiratory: no cough, no shortness of breath during exertion, no shortness of breath at rest and no wheezing.     Gastrointestinal: no abdominal pain, no  "constipation, no blood in stools, no diarrhea, no melena, no nausea, no rectal pain and no vomiting.     Genitourinary: no dysuria, no change in urinary frequency, no urinary hesitancy and no feelings of urinary urgency.     Musculoskeletal: no arthralgias, no back pain and no myalgias.     Integumentary: no new skin lesions and no rashes.     Neurological: no difficulty walking, no headache, no limb weakness, no numbness and no tingling.     Psychiatric: no anxiety, no depression, no anhedonia and no substance use disorders.     Endocrine: no recent weight gain and no recent weight loss.     Hematologic/Lymphatic: no tendency for easy bruising and no swollen glands.          All other systems have been reviewed and are negative for complaint.      Objective   Vitals:  /78   Pulse 96   Ht 1.753 m (5' 9\")   Wt 92.7 kg (204 lb 6.4 oz)   SpO2 96%   BMI 30.18 kg/m²       Physical Exam  Constitutional: Alert and in no acute distress. Well developed, well nourished.     Eyes: Normal external exam. Pupils were equal in size, round, reactive to light (PERRL) with normal accommodation and extraocular movements intact (EOMI).     Ears, Nose, Mouth, and Throat: External inspection of ears and nose: Normal.  Otoscopic examination: Normal.      Neck: No neck mass was observed. Supple.     Cardiovascular: Heart rate and rhythm were normal, normal S1 and S2, no gallops, no murmurs and no pericardial rub    Pulmonary: No respiratory distress. Clear bilateral breath sounds.     Abdomen: Soft nontender; no abdominal mass palpated. No organomegaly.     Musculoskeletal: No joint swelling seen, normal movements of all extremities. Range of motion: Normal.  Muscle strength/tone: Normal.          Neurologic: Deep tendon reflexes were 2+ and symmetric. Sensation: Normal.     Psychiatric: Judgment and insight: Intact. Mood and affect: Normal.      Assessment/Plan   Problem List Items Addressed This Visit       S/P CABG x 2 - " Primary    Diabetes (Multi)    Relevant Orders    Albumin-Creatinine Ratio, Urine Random    Hemoglobin A1C    Stage 3b chronic kidney disease (Multi)    Relevant Orders    Basic Metabolic Panel     Other Visit Diagnoses       Need for influenza vaccination        Relevant Orders    Flu vaccine, trivalent, preservative free, HIGH-DOSE, age 65y+ (Fluzone) (Completed)    Screening for prostate cancer        Relevant Orders    Prostate Specific Antigen, Screen    Medicare annual wellness visit, subsequent        Skin exam for malignant neoplasm        Relevant Orders    Referral to Dermatology        84-year-old male with hypertension, hyperlipidemia, coronary artery disease s/p CABG in 2018, type 2 diabetes, CKD stage IIIb/IV, A-fib        See the other note for CMD mgmt   Growths below the lower eye lids - derm referral      Immunizations :  Influenza : Given today  Prevnar 13 : Given previously    Shingles:  recommended to receive at the pharmacy    Cancer screenings:     Colon cancer:     not indicated  Lung cancer :      not indicated  HIV screening:    not indicated    Prostate cancer: Screening ordered     This note is intended for the physician writing it, as well as to communicate findings to other healthcare professionals. These notes use medical lexicon that may be misunderstood by non medical persons. Therefore, interpretations of medical notes and terminology should be approached with caution.

## 2024-09-30 PROBLEM — R10.9 ABDOMINAL PAIN: Status: ACTIVE | Noted: 2024-09-30

## 2024-09-30 PROBLEM — N40.1 BENIGN PROSTATIC HYPERPLASIA WITH URINARY OBSTRUCTION: Status: ACTIVE | Noted: 2024-09-30

## 2024-09-30 PROBLEM — N12 PYELONEPHRITIS: Status: ACTIVE | Noted: 2024-09-30
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
## 2024-09-30 PROBLEM — N13.8 BENIGN PROSTATIC HYPERPLASIA WITH URINARY OBSTRUCTION: Status: ACTIVE | Noted: 2024-09-30

## 2024-09-30 PROBLEM — G89.18 POSTOPERATIVE PAIN: Status: ACTIVE | Noted: 2024-09-30

## 2024-09-30 PROBLEM — S82.899A CLOSED FRACTURE OF ANKLE: Status: ACTIVE | Noted: 2024-09-30

## 2024-09-30 RX ORDER — LATANOPROST 50 UG/ML
SOLUTION/ DROPS OPHTHALMIC
COMMUNITY
Start: 2024-07-26

## 2024-10-01 ENCOUNTER — APPOINTMENT (OUTPATIENT)
Dept: CARDIOLOGY | Facility: CLINIC | Age: 84
End: 2024-10-01
Payer: MEDICARE

## 2024-10-01 VITALS
RESPIRATION RATE: 18 BRPM | HEIGHT: 72 IN | BODY MASS INDEX: 27.4 KG/M2 | SYSTOLIC BLOOD PRESSURE: 116 MMHG | WEIGHT: 202.3 LBS | DIASTOLIC BLOOD PRESSURE: 72 MMHG | OXYGEN SATURATION: 95 % | HEART RATE: 86 BPM

## 2024-10-01 DIAGNOSIS — R00.2 PALPITATIONS: Primary | ICD-10-CM

## 2024-10-01 PROCEDURE — 99213 OFFICE O/P EST LOW 20 MIN: CPT | Performed by: INTERNAL MEDICINE

## 2024-10-01 PROCEDURE — 1126F AMNT PAIN NOTED NONE PRSNT: CPT | Performed by: INTERNAL MEDICINE

## 2024-10-01 PROCEDURE — 1160F RVW MEDS BY RX/DR IN RCRD: CPT | Performed by: INTERNAL MEDICINE

## 2024-10-01 PROCEDURE — 3078F DIAST BP <80 MM HG: CPT | Performed by: INTERNAL MEDICINE

## 2024-10-01 PROCEDURE — 3074F SYST BP LT 130 MM HG: CPT | Performed by: INTERNAL MEDICINE

## 2024-10-01 PROCEDURE — 1159F MED LIST DOCD IN RCRD: CPT | Performed by: INTERNAL MEDICINE

## 2024-10-01 PROCEDURE — 1036F TOBACCO NON-USER: CPT | Performed by: INTERNAL MEDICINE

## 2024-10-01 ASSESSMENT — PATIENT HEALTH QUESTIONNAIRE - PHQ9
SUM OF ALL RESPONSES TO PHQ9 QUESTIONS 1 AND 2: 0
1. LITTLE INTEREST OR PLEASURE IN DOING THINGS: NOT AT ALL
2. FEELING DOWN, DEPRESSED OR HOPELESS: NOT AT ALL

## 2024-10-01 ASSESSMENT — PAIN SCALES - GENERAL: PAINLEVEL: 0-NO PAIN

## 2024-10-01 ASSESSMENT — COLUMBIA-SUICIDE SEVERITY RATING SCALE - C-SSRS: 1. IN THE PAST MONTH, HAVE YOU WISHED YOU WERE DEAD OR WISHED YOU COULD GO TO SLEEP AND NOT WAKE UP?: NO

## 2024-10-01 NOTE — PROGRESS NOTES
Primary Care Physician: Maggie Gutierrez MD  Date of Visit: 10/01/2024  2:00 PM EDT  Location of visit: [facility] [place] [address]     Chief Complaint:   Chief Complaint   Patient presents with    Follow-up        HPI / Summary:   Mauro Grande is a 84 y.o. male presents for followup.     84-year-old male with history of two-vessel bypass in 2018 off-pump, type 2 diabetes, grade 3 CKD, hypertension, dyslipidemia, WES.  Wife is also my patient.  Feels like he is slowing down no specific symptoms to suggest angina arrhythmia or heart failure.  He has A-fib on anticoagulation dose adjusted for his age and renal impairment well-developed male in no acute distress.  Flat JVP.  Irregular rhythm.  Clear lungs.  Soft abdomen.  Mild ankle swelling intact lower extremity pulses    Specialty Problems          Cardiology Problems    Abnormal stress electrocardiogram test    Accelerating angina (Multi)    CAD in native artery    HLD (hyperlipidemia)    Hypertension    New onset a-fib (Multi)    Palpitations    S/P CABG x 2        Past Medical History:   Diagnosis Date    Diabetes mellitus (Multi)     Hyperlipemia     Hypertension           Past Surgical History:   Procedure Laterality Date    MR HEAD ANGIO WO IV CONTRAST  10/23/2016    MR HEAD ANGIO WO IV CONTRAST 10/23/2016 [facility] [place] ANCILLARY LEGACY    NOSE SURGERY      TONSILLECTOMY      VASECTOMY            Social History:   reports that he has never smoked. He has never used smokeless tobacco. He reports current alcohol use. He reports that he does not use drugs.      Allergies:  No Known Allergies    Outpatient Medications:  Current Outpatient Medications   Medication Instructions    atorvastatin (LIPITOR) 40 mg, oral, Daily    cholecalciferol (Vitamin D-3) 50 MCG (2000 UT) tablet 1 tablet, oral, Daily    Eliquis 2.5 mg, oral, 2 times daily    empagliflozin (JARDIANCE) 10 mg, oral, Daily    glipiZIDE XL (GLUCOTROL XL) 5 mg, oral, Daily, Take two in the morning one in the afternoon  and one at bedtime    hydroCHLOROthiazide (HYDRODIURIL) 25 mg, oral, Daily    latanoprost (Xalatan) 0.005 % ophthalmic solution     losartan (COZAAR) 25 mg, oral, Daily    metFORMIN (GLUCOPHAGE) 500 mg, oral, Every 12 hours    metoprolol succinate XL (TOPROL-XL) 25 mg, oral, Daily    omeprazole (PriLOSEC) 20 mg DR capsule oral, Daily before breakfast       ROS     Physical Exam:  Vitals:    10/01/24 1405   BP: 116/72   BP Location: Right arm   Patient Position: Sitting   BP Cuff Size: Adult   Pulse: 86   Resp: 18   SpO2: 95%   Weight: 91.8 kg (202 lb 4.8 oz)   Height: 1.829 m (6')     Wt Readings from Last 5 Encounters:   10/01/24 91.8 kg (202 lb 4.8 oz)   09/23/24 92.7 kg (204 lb 6.4 oz)   07/15/24 95.9 kg (211 lb 6.4 oz)   04/01/24 89.4 kg (197 lb)   09/29/23 93.5 kg (206 lb 2 oz)     Body mass index is 27.44 kg/m².        Last Labs:  CMP:  Recent Labs     07/15/24  1434 09/07/23  1208 08/16/23  1210 07/28/23  1126 04/11/23  0611    141 141 140 137   K 3.8 4.8 4.5 4.1 3.8    103 103 102 102   CO2 26 27 26 24 25   ANIONGAP 14 16 17 18 14   BUN 34* 35* 36* 33* 37*   CREATININE 1.97* 2.21* 1.96* 1.99* 1.71*   EGFR 33*  --   --   --   --    GLUCOSE 280* 195* 223* 211* 194*     Recent Labs     09/07/23  1208 08/16/23  1210 07/28/23  1126 04/11/23  0611 04/05/23  1948 08/31/22  1450 06/16/22  1139 05/26/22  1357 05/06/22  1251 05/13/21  1451 04/28/21  2205 06/24/20  1310 05/07/20  1059   ALBUMIN 4.3 4.2 4.3 3.2* 3.8   < > 3.9   < > 3.9   < > 4.1   < > 4.3   ALKPHOS 85 79 89 67 81   < > 85   < > 106   < > 98   < > 98   ALT 24 15 17 27 22   < > 22   < > 25   < > 20   < > 30   AST 24 18 20 23 19   < > 21   < > 27   < > 20   < > 31   BILITOT 1.0 0.9 0.7 0.9 1.1   < > 0.7   < > 1.0   < > 0.8   < > 1.0   LIPASE  --   --  55  --   --   --  49  --  82  --  85*  --  63    < > = values in this interval not displayed.     CBC:  Recent Labs     07/15/24  1434 09/07/23  1208 08/16/23  1210 07/28/23  1126 04/11/23  0611    WBC 16.6* 13.2* 12.4* 14.5* 10.8   HGB 13.1* 14.1 13.0* 14.3 11.8*   HCT 39.5* 42.7 41.7 43.0 36.5*    203 196 229 212   MCV 95 97 103* 97 97     COAG:   Recent Labs     04/05/23 1948 04/28/21  2205 09/19/18  0942 09/18/18  0627 09/17/18  0617   INR 1.2* 1.2* 1.1 1.0 1.0     HEME/ENDO:  Recent Labs     07/15/24  1434 07/28/23  1126 06/16/22  1139 05/06/22  1251 04/29/21  1207 09/17/18  1830 09/15/18  0246   IRONSAT  --   --   --   --   --   --  16*   TSH 2.23 3.84 3.33 1.85  --   --   --    HGBA1C 9.1* 7.8*  --  8.0* 7.8   < >  --     < > = values in this interval not displayed.      CARDIAC:   Recent Labs     04/05/23  1948   TROPHS 11     Recent Labs     07/15/24  1434 07/28/23  1126 05/06/22  1251   CHOL 140 180 162   LDLF  --  95 99   HDL 41.6 38.1* 34.4*   TRIG 178* 236* 143       Last Cardiology Tests:  ECG:      Echo:  Echo Results:  No results found for this or any previous visit from the past 3650 days.       Cath:      Stress Test:  Stress Results:  No results found for this or any previous visit from the past 365 days.         Cardiac Imaging:  ECG 12 lead (Clinic Performed)  Sinus rhythm with occasional Premature ventricular complexes and Possible Premature atrial complexes with Aberrant conduction  Inferior infarct , age undetermined  Abnormal ECG  When compared with ECG of 05-APR-2023 19:41,  Premature ventricular complexes are now Present  Nonspecific T wave abnormality now evident in Lateral leads  Confirmed by Smith Aden ([id number]) on 4/4/2024 10:37:44 AM        Assessment/Plan   CAD, A-fib, CKD, hypertension, DM2, DL, OA continue current medical regimen office follow-up 6 months        Orders:  No orders of the defined types were placed in this encounter.     Followup Appts:  Future Appointments   Date Time Provider Department Center   12/11/2024 10:00 AM Bruce Kline MD [id number] [facility]   12/23/2024  1:30 PM Maggie Gutierrez MD [id number] [facility]   4/7/2025  2:00 PM Smith Aden MD  [id number] [facility]           ____________________________________________________________  Smith Aden MD    Senior Attending Physician  [facility]  [facility]

## 2024-10-01 NOTE — PROGRESS NOTES
Primary Care Physician: Maggie Gutierrez MD  Date of Visit: 10/01/2024  2:00 PM EDT  Location of visit: [facility] [place] [address]     Chief Complaint:   Chief Complaint   Patient presents with   • Follow-up        HPI / Summary:   Mauro Grande is a 84 y.o. male presents for followup.   Off-pump CABG September 2018 [place] to LAD diagonal, DM2, CKD, PAF    Dmx2  CKD Bun/CR 34/1.97    Feels like slowing down.  Tasks take 'longer and longer'.  Specialty Problems          Cardiology Problems    Abnormal stress electrocardiogram test    Accelerating angina (Multi)    CAD in native artery    HLD (hyperlipidemia)    Hypertension    New onset a-fib (Multi)    Palpitations    S/P CABG x 2        Past Medical History:   Diagnosis Date   • Diabetes mellitus (Multi)    • Hyperlipemia    • Hypertension           Past Surgical History:   Procedure Laterality Date   • MR HEAD ANGIO WO IV CONTRAST  10/23/2016    MR HEAD ANGIO WO IV CONTRAST 10/23/2016 [facility] ANCILLARY LEGACY   • NOSE SURGERY     • TONSILLECTOMY     • VASECTOMY            Social History:   reports that he has never smoked. He has never used smokeless tobacco. He reports current alcohol use. He reports that he does not use drugs.      Allergies:  No Known Allergies    Outpatient Medications:  Current Outpatient Medications   Medication Instructions   • atorvastatin (LIPITOR) 40 mg, oral, Daily   • cholecalciferol (Vitamin D-3) 50 MCG (2000 UT) tablet 1 tablet, oral, Daily   • Eliquis 2.5 mg, oral, 2 times daily   • empagliflozin (JARDIANCE) 10 mg, oral, Daily   • glipiZIDE XL (GLUCOTROL XL) 5 mg, oral, Daily, Take two in the morning one in the afternoon and one at bedtime   • hydroCHLOROthiazide (HYDRODIURIL) 25 mg, oral, Daily   • latanoprost (Xalatan) 0.005 % ophthalmic solution    • losartan (COZAAR) 25 mg, oral, Daily   • metFORMIN (GLUCOPHAGE) 500 mg, oral, Every 12 hours   • metoprolol succinate XL (TOPROL-XL) 25 mg, oral, Daily   • omeprazole (PriLOSEC) 20 mg   capsule oral, Daily before breakfast       ROS     Physical Exam:  Vitals:    10/01/24 1405   BP: 116/72   BP Location: Right arm   Patient Position: Sitting   BP Cuff Size: Adult   Pulse: 86   Resp: 18   SpO2: 95%   Weight: 91.8 kg (202 lb 4.8 oz)   Height: 1.829 m (6')     Wt Readings from Last 5 Encounters:   10/01/24 91.8 kg (202 lb 4.8 oz)   09/23/24 92.7 kg (204 lb 6.4 oz)   07/15/24 95.9 kg (211 lb 6.4 oz)   04/01/24 89.4 kg (197 lb)   09/29/23 93.5 kg (206 lb 2 oz)     Body mass index is 27.44 kg/m².        Last Labs:  CMP:  Recent Labs     07/15/24  1434 09/07/23  1208 08/16/23  1210 07/28/23  1126 04/11/23  0611    141 141 140 137   K 3.8 4.8 4.5 4.1 3.8    103 103 102 102   CO2 26 27 26 24 25   ANIONGAP 14 16 17 18 14   BUN 34* 35* 36* 33* 37*   CREATININE 1.97* 2.21* 1.96* 1.99* 1.71*   EGFR 33*  --   --   --   --    GLUCOSE 280* 195* 223* 211* 194*     Recent Labs     09/07/23  1208 08/16/23  1210 07/28/23  1126 04/11/23  0611 04/05/23  1948 08/31/22  1450 06/16/22  1139 05/26/22  1357 05/06/22  1251 05/13/21  1451 04/28/21  2205 06/24/20  1310 05/07/20  1059   ALBUMIN 4.3 4.2 4.3 3.2* 3.8   < > 3.9   < > 3.9   < > 4.1   < > 4.3   ALKPHOS 85 79 89 67 81   < > 85   < > 106   < > 98   < > 98   ALT 24 15 17 27 22   < > 22   < > 25   < > 20   < > 30   AST 24 18 20 23 19   < > 21   < > 27   < > 20   < > 31   BILITOT 1.0 0.9 0.7 0.9 1.1   < > 0.7   < > 1.0   < > 0.8   < > 1.0   LIPASE  --   --  55  --   --   --  49  --  82  --  85*  --  63    < > = values in this interval not displayed.     CBC:  Recent Labs     07/15/24  1434 09/07/23  1208 08/16/23  1210 07/28/23  1126 04/11/23  0611   WBC 16.6* 13.2* 12.4* 14.5* 10.8   HGB 13.1* 14.1 13.0* 14.3 11.8*   HCT 39.5* 42.7 41.7 43.0 36.5*    203 196 229 212   MCV 95 97 103* 97 97     COAG:   Recent Labs     04/05/23  1948 04/28/21  2205 09/19/18  0942 09/18/18  0627 09/17/18  0617   INR 1.2* 1.2* 1.1 1.0 1.0     HEME/ENDO:  Recent Labs      07/15/24  1434 07/28/23  1126 06/16/22  1139 05/06/22  1251 04/29/21  1207 09/17/18  1830 09/15/18  0246   IRONSAT  --   --   --   --   --   --  16*   TSH 2.23 3.84 3.33 1.85  --   --   --    HGBA1C 9.1* 7.8*  --  8.0* 7.8   < >  --     < > = values in this interval not displayed.      CARDIAC:   Recent Labs     04/05/23  1948   TROPHS 11     Recent Labs     07/15/24  1434 07/28/23  1126 05/06/22  1251   CHOL 140 180 162   LDLF  --  95 99   HDL 41.6 38.1* 34.4*   TRIG 178* 236* 143       Last Cardiology Tests:  ECG:      Echo:  Echo Results:  No results found for this or any previous visit from the past 3650 days.       Cath:      Stress Test:  Stress Results:  No results found for this or any previous visit from the past 365 days.         Cardiac Imaging:  ECG 12 lead (Clinic Performed)  Sinus rhythm with occasional Premature ventricular complexes and Possible Premature atrial complexes with Aberrant conduction  Inferior infarct , age undetermined  Abnormal ECG  When compared with ECG of 05-APR-2023 19:41,  Premature ventricular complexes are now Present  Nonspecific T wave abnormality now evident in Lateral leads  Confirmed by Smith Aden ([id number]) on 4/4/2024 10:37:44 AM        Assessment/Plan           Orders:  No orders of the defined types were placed in this encounter.     Followup Appts:  Future Appointments   Date Time Provider Department Center   12/11/2024 10:00 AM Bruce Kline MD [id number] [facility]   12/23/2024  1:30 PM Maggie Gutierrez MD [id number] [facility]           ____________________________________________________________  Smith Aden MD    Senior Attending Physician  [facility]  [facility]

## 2024-11-14 DIAGNOSIS — I48.91 ATRIAL FIBRILLATION, UNSPECIFIED TYPE (MULTI): ICD-10-CM

## 2024-11-15 RX ORDER — APIXABAN 2.5 MG/1
2.5 TABLET, FILM COATED ORAL 2 TIMES DAILY
Qty: 180 TABLET | Refills: 3 | Status: SHIPPED | OUTPATIENT
Start: 2024-11-15

## 2024-11-21 DIAGNOSIS — I10 PRIMARY HYPERTENSION: ICD-10-CM

## 2024-11-22 RX ORDER — HYDROCHLOROTHIAZIDE 25 MG/1
25 TABLET ORAL DAILY
Qty: 90 TABLET | Refills: 1 | Status: SHIPPED | OUTPATIENT
Start: 2024-11-22

## 2024-12-09 ENCOUNTER — LAB (OUTPATIENT)
Dept: LAB | Facility: LAB | Age: 84
End: 2024-12-09
Payer: MEDICARE

## 2024-12-09 DIAGNOSIS — Z12.5 SCREENING FOR PROSTATE CANCER: ICD-10-CM

## 2024-12-09 DIAGNOSIS — E11.69 TYPE 2 DIABETES MELLITUS WITH OTHER SPECIFIED COMPLICATION, WITHOUT LONG-TERM CURRENT USE OF INSULIN: ICD-10-CM

## 2024-12-09 DIAGNOSIS — N18.32 STAGE 3B CHRONIC KIDNEY DISEASE (MULTI): ICD-10-CM

## 2024-12-09 LAB
ANION GAP SERPL CALC-SCNC: 15 MMOL/L (ref 10–20)
BUN SERPL-MCNC: 40 MG/DL (ref 6–23)
CALCIUM SERPL-MCNC: 9.6 MG/DL (ref 8.6–10.6)
CHLORIDE SERPL-SCNC: 103 MMOL/L (ref 98–107)
CO2 SERPL-SCNC: 26 MMOL/L (ref 21–32)
CREAT SERPL-MCNC: 2.45 MG/DL (ref 0.5–1.3)
CREAT UR-MCNC: 49.8 MG/DL (ref 20–370)
EGFRCR SERPLBLD CKD-EPI 2021: 25 ML/MIN/1.73M*2
EST. AVERAGE GLUCOSE BLD GHB EST-MCNC: 194 MG/DL
GLUCOSE SERPL-MCNC: 230 MG/DL (ref 74–99)
HBA1C MFR BLD: 8.4 %
MICROALBUMIN UR-MCNC: <7 MG/L
MICROALBUMIN/CREAT UR: NORMAL MG/G{CREAT}
POTASSIUM SERPL-SCNC: 4.2 MMOL/L (ref 3.5–5.3)
PSA SERPL-MCNC: 1.94 NG/ML
SODIUM SERPL-SCNC: 140 MMOL/L (ref 136–145)

## 2024-12-09 PROCEDURE — 82570 ASSAY OF URINE CREATININE: CPT

## 2024-12-09 PROCEDURE — 36415 COLL VENOUS BLD VENIPUNCTURE: CPT

## 2024-12-09 PROCEDURE — 80048 BASIC METABOLIC PNL TOTAL CA: CPT

## 2024-12-09 PROCEDURE — 83036 HEMOGLOBIN GLYCOSYLATED A1C: CPT

## 2024-12-09 PROCEDURE — G0103 PSA SCREENING: HCPCS

## 2024-12-09 PROCEDURE — 82043 UR ALBUMIN QUANTITATIVE: CPT

## 2024-12-10 NOTE — PROGRESS NOTES
"Nephrology Outpatient New Patient Visit Note    Chief Concern:  CKD    History Of Present Illness  Maruo Grande is a 84 y.o. male with a history of CKD, CAD, CABG September 2018, DM2, PAF   - baseline Scr ~1.9-2.2 since at least 2018, on last labs 12/9: Scr up to 2.45, eGFR 31 w/BSA after starting jardiance on 7/2024, vit D and PTH OK  ACR <7, HbA1C 8.4, bland UA  - on losartan, jardiance, HCTZ  - risk of ESKD at 2y 0.6%, at 5y 1.1% with RASi/SGLT2i use   - he fells \"old\"    Past Medical History  He has a past medical history of Diabetes mellitus (Multi), Hyperlipemia, and Hypertension.  Patient Active Problem List   Diagnosis    Syncope    Sleep trouble    S/P CABG x 2    Primary localized osteoarthrosis of right lower leg    Palpitations    Hypertension    HLD (hyperlipidemia)    GERD (gastroesophageal reflux disease)    New onset a-fib (Multi)    Anemia    Accelerating angina (Multi)    Abnormal stress electrocardiogram test    CAD in native artery    Diabetes (Multi)    Ureteral stone with hydronephrosis    Wrist pain    Wheezing    UTI (urinary tract infection)    Tendinitis of right shoulder    Prominent ileocecal valve    Pain in right knee    Onychomycosis    History of colonic polyps    Constipation    Distal radius fracture, left    Diverticulosis of large intestine without hemorrhage    Elevated white blood cell count    Stage 3b chronic kidney disease (Multi)    Abdominal pain    Benign prostatic hyperplasia with urinary obstruction    Closed fracture of ankle    Fall    Postoperative pain    Pyelonephritis       Surgical History  He has a past surgical history that includes MR angio head wo IV contrast (10/23/2016); Tonsillectomy; Nose surgery; and Vasectomy.     Social History  He reports that he has never smoked. He has never used smokeless tobacco. He reports current alcohol use. He reports that he does not use drugs.    Family History  Family History   Problem Relation Name Age of Onset    Cancer " "Mother      Other (Heart problems) Father      Other (Heart problems) Mother's Sister      Lung cancer Father's Brother      Alzheimer's disease Father's Brother      Other (Heart problems) Maternal Grandfather          Allergies  Patient has no known allergies.    Review of Systems  A 12-point review of systems was performed and noted be negative except for that which was mentioned in the history of present illness     Last Recorded Vitals  /77 (BP Location: Right arm, Patient Position: Sitting, BP Cuff Size: Adult)   Pulse 110   Temp 36.4 °C (97.6 °F) (Temporal)   Ht 1.753 m (5' 9\")   Wt 93 kg (205 lb)   BMI 30.27 kg/m²      Physical Exam:  Constitutional:  No acute distress  Respiration:  Breathing comfortably, no stridor  Cardiovascular:  No clubbing/cyanosis/edema in hands  Eyes:  EOM intact, sclera normal  Neuro:  Alert and oriented times 3, Cranial nerves II-XII grossly intact and symmetric bilaterally. No asterixis  Head and Face:  Symmetric facial features, no masses or lesions, sinuses non-tender to palpation  Neck/Lymph:  No LAD, no thyroid masses, trachea midline  Skin:  skin is without visible rash  Psych:  Alert and oriented with appropriate mood and affect      Medications:  Medication Documentation Review Audit       Reviewed by mSith Aden MD (Physician) on 10/01/24 at 1418      Medication Order Taking? Sig Documenting Provider Last Dose Status   atorvastatin (Lipitor) 40 mg tablet [id number]  Take 1 tablet (40 mg) by mouth once daily. Smith Aden MD  Active   cholecalciferol (Vitamin D-3) 50 MCG (2000 UT) tablet [id number]  Take 1 tablet (2,000 Units) by mouth once daily. Historical Provider, MD  Active   Eliquis 2.5 mg tablet [id number]  Take 1 tablet (2.5 mg) by mouth 2 times a day. Smith Aden MD  Active   empagliflozin (Jardiance) 10 mg [id number]  Take 1 tablet (10 mg) by mouth once daily. Maggie Gutierrez MD  Active   glipiZIDE XL (Glucotrol XL) 5 mg 24 hr tablet " [id number]  Take 1 tablet (5 mg) by mouth once daily. Take two in the morning one in the afternoon and one at bedtime Historical Provider, MD  Active   hydroCHLOROthiazide (HYDRODiuril) 25 mg tablet [id number]  Take 1 tablet (25 mg) by mouth once daily. Historical Provider, MD  Active   latanoprost (Xalatan) 0.005 % ophthalmic solution [id number] Yes  Historical Provider, MD  Active   losartan (Cozaar) 25 mg tablet [id number]  Take 1 tablet (25 mg) by mouth once daily. Historical Provider, MD  Active   metFORMIN (Glucophage) 500 mg tablet [id number]  Take 1 tablet (500 mg) by mouth every 12 hours. Historical Provider, MD  Active   metoprolol succinate XL (Toprol-XL) 25 mg 24 hr tablet [id number]  Take 1 tablet (25 mg) by mouth once daily. Maggie Gutierrez MD  Active   omeprazole (PriLOSEC) 20 mg DR capsule [id number]  Take by mouth once daily in the morning. Take before meals. Historical Provider, MD  Active                    Relevant Results Recent labs reviewed in the EMR.  Lab Results   Component Value Date    HGB 13.1 (L) 07/15/2024    HGB 14.1 09/07/2023    HGB 13.0 (L) 08/16/2023    HGB 14.3 07/28/2023    MCV 95 07/15/2024    MCV 97 09/07/2023     (H) 08/16/2023    MCV 97 07/28/2023     07/15/2024     09/07/2023     08/16/2023     07/28/2023       Lab Results   Component Value Date    IRON 31 (L) 09/15/2018       Lab Results   Component Value Date     12/09/2024    K 4.2 12/09/2024     12/09/2024    BUN 40 (H) 12/09/2024    CREATININE 2.45 (H) 12/09/2024         Imaging:  I personally reviewed and this is my impression:        Assessment/Plan   1. Stage 3b chronic kidney disease (Multi) (Primary)  - stable non albuminuric CKD for many years, likely from again/DM/HTN, Scr up just a bit after starting SGLT2i, also on RASi and I agree with both. No additional changes for now, SBP acceptable for his age but he needs to improve his DM control, target hbA1c <7, we talk  about need to reduce dietary transgressions. At current state, low risk of progression to ESKD  - Referral to Nephrology  - Vitamin D 25-Hydroxy,Total (for eval of Vitamin D levels); Future  - PTH, intact; Future  - Renal function panel; Future  - CBC; Future  - Albumin-Creatinine Ratio, Urine Random; Future  - Follow Up In Nephrology; Future    2. Essential hypertension  - controlled  - Follow Up In Nephrology; Future     - RTO 6 months, labs before   Bruce Kline MD  Division of Nephrology and Hypertension

## 2024-12-11 ENCOUNTER — APPOINTMENT (OUTPATIENT)
Dept: NEPHROLOGY | Facility: CLINIC | Age: 84
End: 2024-12-11
Payer: MEDICARE

## 2024-12-11 VITALS
SYSTOLIC BLOOD PRESSURE: 130 MMHG | HEIGHT: 69 IN | DIASTOLIC BLOOD PRESSURE: 77 MMHG | HEART RATE: 110 BPM | BODY MASS INDEX: 30.36 KG/M2 | TEMPERATURE: 97.6 F | WEIGHT: 205 LBS

## 2024-12-11 DIAGNOSIS — I10 ESSENTIAL HYPERTENSION: ICD-10-CM

## 2024-12-11 DIAGNOSIS — N18.32 STAGE 3B CHRONIC KIDNEY DISEASE (MULTI): Primary | ICD-10-CM

## 2024-12-11 PROCEDURE — 99204 OFFICE O/P NEW MOD 45 MIN: CPT | Performed by: INTERNAL MEDICINE

## 2024-12-11 PROCEDURE — 1036F TOBACCO NON-USER: CPT | Performed by: INTERNAL MEDICINE

## 2024-12-11 PROCEDURE — 1159F MED LIST DOCD IN RCRD: CPT | Performed by: INTERNAL MEDICINE

## 2024-12-11 PROCEDURE — 3075F SYST BP GE 130 - 139MM HG: CPT | Performed by: INTERNAL MEDICINE

## 2024-12-11 PROCEDURE — 3078F DIAST BP <80 MM HG: CPT | Performed by: INTERNAL MEDICINE

## 2024-12-16 ENCOUNTER — APPOINTMENT (OUTPATIENT)
Dept: PRIMARY CARE | Facility: CLINIC | Age: 84
End: 2024-12-16
Payer: MEDICARE

## 2024-12-16 VITALS
OXYGEN SATURATION: 95 % | DIASTOLIC BLOOD PRESSURE: 75 MMHG | BODY MASS INDEX: 30.45 KG/M2 | SYSTOLIC BLOOD PRESSURE: 117 MMHG | HEART RATE: 105 BPM | HEIGHT: 69 IN | WEIGHT: 205.6 LBS

## 2024-12-16 DIAGNOSIS — E11.69 TYPE 2 DIABETES MELLITUS WITH OTHER SPECIFIED COMPLICATION, WITHOUT LONG-TERM CURRENT USE OF INSULIN: ICD-10-CM

## 2024-12-16 PROCEDURE — 3078F DIAST BP <80 MM HG: CPT | Performed by: STUDENT IN AN ORGANIZED HEALTH CARE EDUCATION/TRAINING PROGRAM

## 2024-12-16 PROCEDURE — 3074F SYST BP LT 130 MM HG: CPT | Performed by: STUDENT IN AN ORGANIZED HEALTH CARE EDUCATION/TRAINING PROGRAM

## 2024-12-16 PROCEDURE — G2211 COMPLEX E/M VISIT ADD ON: HCPCS | Performed by: STUDENT IN AN ORGANIZED HEALTH CARE EDUCATION/TRAINING PROGRAM

## 2024-12-16 PROCEDURE — 99214 OFFICE O/P EST MOD 30 MIN: CPT | Performed by: STUDENT IN AN ORGANIZED HEALTH CARE EDUCATION/TRAINING PROGRAM

## 2024-12-16 PROCEDURE — 1160F RVW MEDS BY RX/DR IN RCRD: CPT | Performed by: STUDENT IN AN ORGANIZED HEALTH CARE EDUCATION/TRAINING PROGRAM

## 2024-12-16 PROCEDURE — 1159F MED LIST DOCD IN RCRD: CPT | Performed by: STUDENT IN AN ORGANIZED HEALTH CARE EDUCATION/TRAINING PROGRAM

## 2024-12-16 PROCEDURE — 1036F TOBACCO NON-USER: CPT | Performed by: STUDENT IN AN ORGANIZED HEALTH CARE EDUCATION/TRAINING PROGRAM

## 2024-12-16 NOTE — PROGRESS NOTES
"Subjective   Patient ID: Mauro Grande is a 84 y.o. male who presents for Follow-up (3 month follow-up. ).        HPI    84-year-old male with hypertension, hyperlipidemia, coronary artery disease s/p CABG in 2018, type 2 diabetes, CKD stage /IV, A-fib      Fu on dm2         Visit Vitals  /75   Pulse 105   Ht 1.753 m (5' 9\")   Wt 93.3 kg (205 lb 9.6 oz)   SpO2 95%   BMI 30.36 kg/m²   Smoking Status Never   BSA 2.13 m²      No LMP for male patient.   Current Outpatient Medications   Medication Instructions    atorvastatin (LIPITOR) 40 mg, oral, Daily    cholecalciferol (Vitamin D-3) 50 MCG (2000 UT) tablet 1 tablet, Daily    Eliquis 2.5 mg, oral, 2 times daily    empagliflozin (JARDIANCE) 25 mg, oral, Daily, Dispense after 1/1/25    glipiZIDE XL (GLUCOTROL XL) 5 mg, Daily    hydroCHLOROthiazide (HYDRODIURIL) 25 mg, oral, Daily    latanoprost (Xalatan) 0.005 % ophthalmic solution     losartan (COZAAR) 25 mg, Daily    metFORMIN (GLUCOPHAGE) 500 mg, Every 12 hours    metoprolol succinate XL (TOPROL-XL) 25 mg, oral, Daily    omeprazole (PriLOSEC) 20 mg DR capsule Daily before breakfast      Social History     Tobacco Use    Smoking status: Never    Smokeless tobacco: Never   Substance Use Topics    Alcohol use: Yes     Comment: Occasionally        Review of Systems    Constitutional : No feeling poorly / fevers/ chills / night sweats/ fatigue   Cardiovascular : No CP /Palpitations/ lower extremity edema / syncope   Respiratory : No Cough /VARGAS/Dyspnea at rest   Gastrointestinal : No abd pain / N/V  No bloody stools/ melena / constipation  Endo : No polyuria/polydipsia/ muscle weakness / sluggishness   CNS: No confusion / HA/ tingling/ numbness/ weakness of extremities  Psychiatric: No anxiety/ depression/ SI/HI    All other systems have been reviewed and are negative for complaint       Physical Exam    Constitutional : Vitals reviewed. Alert and in no distress  Cardiovascular : RRR, Normal S1, S2, No pericardial " rub/ gallop, no peripheral edema   Pulmonary: No respiratory distress, CTAB   MSK : Normal gait and station , strength and tone   Skin: Warm to touch ,  normal skin turgor   Neurologic : CNs 2-12 grossly intact , no obvious FNDs  Psych : A,Ox3, normal mood and affect      Assessment/Plan   Diagnoses and all orders for this visit:  Type 2 diabetes mellitus with other specified complication, without long-term current use of insulin  -     Referral to Clinical Pharmacy; Future  -     empagliflozin (Jardiance) 25 mg; Take 1 tablet (25 mg) by mouth once daily. Dispense after 1/1/25  -     Hemoglobin A1C; Future          84-year-old male with hypertension, hyperlipidemia, coronary artery disease s/p CABG in 2018, type 2 diabetes, CKD stage /IV, A-fib       A1c reviewed   Increase jardiance to 25mg , can use 2 of the 10s that he has now    Also taking glipizide   Denied any hypoglycemic sx   Skipping the afternoon dose of glipizide   Continue 10mg in the am and lower the evening dose to 5mg ( now taking 10mg )     Sx of hypoglycemia discussed      Ckd  progressing       RTO in April       Conditions addressed and mgmt as noted above.  Pertinent labs, images/ imaging reports , chart review was done .   Age appropriate labs / labs for mgmt of chronic medical conditions ordered, further mgmt pending the results.       This note is intended for the physician writing it, as well as to communicate findings to other healthcare professionals. These notes use medical lexicon that may be misunderstood by non medical persons. Therefore, interpretations of medical notes and terminology should be approached with caution.

## 2024-12-19 NOTE — PROGRESS NOTES
Clinical Pharmacy Appointment    Patient ID: Mauro Grande is a 84 y.o. male who presents for Diabetes.    Pt is here for First appointment.     Referring Provider: Maggie Gutierrez,*  PCP: Maggie Gutierrez MD   Last visit with PCP: 12/16/24   Next visit with PCP: 4/17/25    Subjective     HPI  DIABETES MELLITUS TYPE 2:    Known diabetic complications: CKD.  Does patient follow with Endocrinology: No  Last optometry exam: N/A  Most recent visit in Podiatry: 11/8/24 -- patient denies sores or cuts on feet today      Current diabetic medications include:  Metformin  mg every 12 hours  Glipizide 5 XL mg two tablets with breakfast, 1 tablet with lunch, and 1 tablet with dinner  Jardiance 25 mg daily    Clarifications to above regimen: Patient will double up on 10 mg tablets before starting 25 mg. Glipizide with lunch dropped because he often skips lunch.  Adverse Effects: None    Glucose Readings:  Patient tests BG 1 time per day    Current home BG readings: Usually 120-150. 191 this morning   Previous home BG readings: N/A    Any episodes of hypoglycemia? No.  Did patient treat episode of hypoglycemia appropriately? N/A  Does the patient have a prescription for ready-to-use Glucagon? Not on insulin  Does pt have proteinuria? No    Lifestyle:  Snacks: Ice cream  Drinks: Coke, lemonade, cocktail occasionally  Physical Activity: 20 mins daily, mostly leg exercises    Secondary Prevention:  Statin? Yes  ACE-I/ARB? Yes  Aspirin? No    Pertinent PMH Review:  PMH of Pancreatitis: No  PMH of Retinopathy: No  PMH of Urinary Tract Infections: No  PMH of MTC: No    Immunizations:  Influenza? Yes  COVID? Yes  Pneumonia? No  Shingles? No  Hepatitis B? No     Drug Interactions  No relevant drug interactions were noted.    Medication System Management  Patient's preferred pharmacy: [facility]  Adherence/Organization: N/A  Affordability/Accessibility: Not eligible for [facility] due to income    Objective    No Known Allergies  Social History     Social History Narrative    Lives in an apt with his wife     Retired at age 67 and worked part time until 83           Medication Review  Current Outpatient Medications   Medication Instructions    atorvastatin (LIPITOR) 40 mg, oral, Daily    cholecalciferol (Vitamin D-3) 50 MCG (2000 UT) tablet 1 tablet, Daily    Eliquis 2.5 mg, oral, 2 times daily    empagliflozin (JARDIANCE) 25 mg, oral, Daily, Dispense after 1/1/25    glipiZIDE XL (GLUCOTROL XL) 5 mg, Daily    hydroCHLOROthiazide (HYDRODIURIL) 25 mg, oral, Daily    latanoprost (Xalatan) 0.005 % ophthalmic solution     losartan (COZAAR) 25 mg, Daily    metFORMIN (GLUCOPHAGE) 500 mg, Every 12 hours    metoprolol succinate XL (TOPROL-XL) 25 mg, oral, Daily    omeprazole (PriLOSEC) 20 mg DR capsule Daily before breakfast      Vitals  BP Readings from Last 2 Encounters:   12/16/24 117/75   12/11/24 130/77     BMI Readings from Last 1 Encounters:   12/16/24 30.36 kg/m²      Labs  A1C  Lab Results   Component Value Date    HGBA1C 8.4 (H) 12/09/2024    HGBA1C 9.1 (H) 07/15/2024    HGBA1C 7.8 (A) 07/28/2023     BMP  Lab Results   Component Value Date    CALCIUM 9.6 12/09/2024     12/09/2024    K 4.2 12/09/2024    CO2 26 12/09/2024     12/09/2024    BUN 40 (H) 12/09/2024    CREATININE 2.45 (H) 12/09/2024    EGFR 25 (L) 12/09/2024     LFTs  Lab Results   Component Value Date    ALT 24 09/07/2023    AST 24 09/07/2023    ALKPHOS 85 09/07/2023    BILITOT 1.0 09/07/2023     FLP  Lab Results   Component Value Date    TRIG 178 (H) 07/15/2024    CHOL 140 07/15/2024    LDLF 95 07/28/2023    LDLCALC 63 07/15/2024    HDL 41.6 07/15/2024     Urine Microalbumin  Lab Results   Component Value Date    MICROALBCREA  12/09/2024      Comment:      One or more analytes used in this calculation is outside of the analytical measurement range. Calculation cannot be performed.     Weight Management  Wt Readings from Last 3 Encounters:    12/16/24 93.3 kg (205 lb 9.6 oz)   12/11/24 93 kg (205 lb)   10/01/24 91.8 kg (202 lb 4.8 oz)      There is no height or weight on file to calculate BMI.     Assessment/Plan   Problem List Items Addressed This Visit       Diabetes (Multi)     Patient's goal A1c is < 7%.  Is pt at goal? No, most recent A1C was 8.4% - improved from 9.1% prior.  Patient's SMBGs are relatively controlled with occasional excursions due to dietary indiscretions. Due to medication changes a few days ago with PCP, no medication changes today. Will assess blood sugars and need for medication changes at next visit.    Medication Changes:  CONTINUE  Metformin  mg twice daily  Jardiance 20 mg daily (two 10 mg tablets), then increase to 25 mg in the new year  Glipizide XL 5 mg 2 tablets with breakfast and 1 tablet with dinner         Relevant Orders    Referral to Clinical Pharmacy     Clinical Pharmacist follow-up: 1/24/25, Telehealth visit    Continue all meds under the continuation of care with the referring provider and clinical pharmacy team.    Thank you,  Veena Santacruz, PharmD  Clinical Pharmacist  [phone]    Verbal consent to manage patient's drug therapy was obtained from the patient. They were informed they may decline to participate or withdraw from participation in pharmacy services at any time.

## 2024-12-20 ENCOUNTER — TELEMEDICINE (OUTPATIENT)
Dept: PHARMACY | Facility: HOSPITAL | Age: 84
End: 2024-12-20
Payer: MEDICARE

## 2024-12-20 DIAGNOSIS — E11.69 TYPE 2 DIABETES MELLITUS WITH OTHER SPECIFIED COMPLICATION, WITHOUT LONG-TERM CURRENT USE OF INSULIN: ICD-10-CM

## 2024-12-20 NOTE — ASSESSMENT & PLAN NOTE
Patient's goal A1c is < 7%.  Is pt at goal? No, most recent A1C was 8.4% - improved from 9.1% prior.  Patient's SMBGs are relatively controlled with occasional excursions due to dietary indiscretions. Due to medication changes a few days ago with PCP, no medication changes today. Will assess blood sugars and need for medication changes at next visit.    Medication Changes:  CONTINUE  Metformin  mg twice daily  Jardiance 20 mg daily (two 10 mg tablets), then increase to 25 mg in the new year  Glipizide XL 5 mg 2 tablets with breakfast and 1 tablet with dinner

## 2024-12-23 ENCOUNTER — APPOINTMENT (OUTPATIENT)
Dept: PRIMARY CARE | Facility: CLINIC | Age: 84
End: 2024-12-23
Payer: MEDICARE

## 2025-01-24 ENCOUNTER — APPOINTMENT (OUTPATIENT)
Dept: PHARMACY | Facility: HOSPITAL | Age: 85
End: 2025-01-24
Payer: MEDICARE

## 2025-01-24 DIAGNOSIS — E11.69 TYPE 2 DIABETES MELLITUS WITH OTHER SPECIFIED COMPLICATION, WITHOUT LONG-TERM CURRENT USE OF INSULIN: ICD-10-CM

## 2025-01-24 NOTE — ASSESSMENT & PLAN NOTE
Patient's goal A1c is < 7%.  Is pt at goal? No, most recent A1C was 8.4% - improved from 9.1% prior.  Patient's SMBGs are relatively controlled with occasional excursions due to dietary indiscretions. Blood sugars are better controlled with increased Jardiance dose. Encouraged patient to keep up with healthy lifestyle. Follow up in one month per patient's request.    Medication Changes:  CONTINUE  Metformin  mg twice daily  Jardiance 25 mg daily  Glipizide XL 5 mg 2 tablets with breakfast and 1 tablet with dinner

## 2025-01-24 NOTE — PROGRESS NOTES
Clinical Pharmacy Appointment    Patient ID: Mauro Grande is a 84 y.o. male who presents for Diabetes.    Pt is here for Follow Up appointment.     Referring Provider: Maggie Gutierrez,*  PCP: Maggie Gutierrez MD   Last visit with PCP: 12/16/24   Next visit with PCP: 4/17/25    Subjective     HPI  DIABETES MELLITUS TYPE 2:    Known diabetic complications: CKD.  Does patient follow with Endocrinology: No  Last optometry exam: N/A  Most recent visit in Podiatry: 1/10/25 -- patient reports a sore on his foot today, which was seen by podiatry with follow-up today.       Current diabetic medications include:  Metformin  mg every 12 hours  Glipizide 5 XL mg two tablets with breakfast and 1 tablet with dinner  Jardiance 25 mg daily    Clarifications to above regimen: None  Adverse Effects: None    Glucose Readings:  Patient tests BG 1 time per day    Current home BG readings: Usually 130-140 recently, 134 this morning. Higher when he eats pasta or has some candy after dinner.  Previous home BG readings: Usually 120-150. 191 this morning     Any episodes of hypoglycemia? No.  Did patient treat episode of hypoglycemia appropriately? N/A  Does the patient have a prescription for ready-to-use Glucagon? Not on insulin  Does pt have proteinuria? No    Lifestyle:  Snacks: Ice cream  Drinks: Coke, lemonade, cocktail occasionally  Physical Activity: 20 mins daily, mostly leg exercises    Secondary Prevention:  Statin? Yes  ACE-I/ARB? Yes  Aspirin? No    Pertinent PMH Review:  PMH of Pancreatitis: No  PMH of Retinopathy: No  PMH of Urinary Tract Infections: No  PMH of MTC: No    Immunizations:  Influenza? Yes  COVID? Yes  Pneumonia? No  Shingles? No  Hepatitis B? No     Drug Interactions  No relevant drug interactions were noted.    Medication System Management  Patient's preferred pharmacy: [facility]  Adherence/Organization: N/A  Affordability/Accessibility: Not eligible for  PAP due to  income    Objective   No Known Allergies  Social History     Social History Narrative    Lives in an apt with his wife     Retired at age 67 and worked part time until 83           Medication Review  Current Outpatient Medications   Medication Instructions    atorvastatin (LIPITOR) 40 mg, oral, Daily    cholecalciferol (Vitamin D-3) 50 MCG (2000 UT) tablet 1 tablet, Daily    Eliquis 2.5 mg, oral, 2 times daily    empagliflozin (JARDIANCE) 25 mg, oral, Daily, Dispense after 1/1/25    glipiZIDE XL (GLUCOTROL XL) 5 mg, Daily    hydroCHLOROthiazide (HYDRODIURIL) 25 mg, oral, Daily    latanoprost (Xalatan) 0.005 % ophthalmic solution     losartan (COZAAR) 25 mg, Daily    metFORMIN (GLUCOPHAGE) 500 mg, Every 12 hours    metoprolol succinate XL (TOPROL-XL) 25 mg, oral, Daily    omeprazole (PriLOSEC) 20 mg DR capsule Daily before breakfast      Vitals  BP Readings from Last 2 Encounters:   12/16/24 117/75   12/11/24 130/77     BMI Readings from Last 1 Encounters:   12/16/24 30.36 kg/m²      Labs  A1C  Lab Results   Component Value Date    HGBA1C 8.4 (H) 12/09/2024    HGBA1C 9.1 (H) 07/15/2024    HGBA1C 7.8 (A) 07/28/2023     BMP  Lab Results   Component Value Date    CALCIUM 9.6 12/09/2024     12/09/2024    K 4.2 12/09/2024    CO2 26 12/09/2024     12/09/2024    BUN 40 (H) 12/09/2024    CREATININE 2.45 (H) 12/09/2024    EGFR 25 (L) 12/09/2024     LFTs  Lab Results   Component Value Date    ALT 24 09/07/2023    AST 24 09/07/2023    ALKPHOS 85 09/07/2023    BILITOT 1.0 09/07/2023     FLP  Lab Results   Component Value Date    TRIG 178 (H) 07/15/2024    CHOL 140 07/15/2024    LDLF 95 07/28/2023    LDLCALC 63 07/15/2024    HDL 41.6 07/15/2024     Urine Microalbumin  Lab Results   Component Value Date    MICROALBCREA  12/09/2024      Comment:      One or more analytes used in this calculation is outside of the analytical measurement range. Calculation cannot be performed.     Weight Management  Wt Readings from Last  3 Encounters:   12/16/24 93.3 kg (205 lb 9.6 oz)   12/11/24 93 kg (205 lb)   10/01/24 91.8 kg (202 lb 4.8 oz)      There is no height or weight on file to calculate BMI.     Assessment/Plan   Problem List Items Addressed This Visit       Diabetes (Multi)     Patient's goal A1c is < 7%.  Is pt at goal? No, most recent A1C was 8.4% - improved from 9.1% prior.  Patient's SMBGs are relatively controlled with occasional excursions due to dietary indiscretions. Blood sugars are better controlled with increased Jardiance dose. Encouraged patient to keep up with healthy lifestyle. Follow up in one month per patient's request.    Medication Changes:  CONTINUE  Metformin  mg twice daily  Jardiance 25 mg daily  Glipizide XL 5 mg 2 tablets with breakfast and 1 tablet with dinner         Relevant Orders    Referral to Clinical Pharmacy     Clinical Pharmacist follow-up: 2/21/25, Telehealth visit    Continue all meds under the continuation of care with the referring provider and clinical pharmacy team.    Thank you,  Veena Santacruz, PharmD  Clinical Pharmacist  [phone]    Verbal consent to manage patient's drug therapy was obtained from the patient. They were informed they may decline to participate or withdraw from participation in pharmacy services at any time.

## 2025-02-21 ENCOUNTER — APPOINTMENT (OUTPATIENT)
Dept: PHARMACY | Facility: HOSPITAL | Age: 85
End: 2025-02-21
Payer: MEDICARE

## 2025-02-21 DIAGNOSIS — E11.69 TYPE 2 DIABETES MELLITUS WITH OTHER SPECIFIED COMPLICATION, WITHOUT LONG-TERM CURRENT USE OF INSULIN: ICD-10-CM

## 2025-02-21 NOTE — PROGRESS NOTES
Clinical Pharmacy Appointment    Patient ID: Mauro Grande is a 84 y.o. male who presents for Diabetes.    Pt is here for Follow Up appointment.     Referring Provider: Maggie Gutierrez,*  PCP: Maggie Gutierrez MD   Last visit with PCP: 12/16/24   Next visit with PCP: 4/17/25    Subjective     HPI  DIABETES MELLITUS TYPE 2:    Known diabetic complications: CKD.  Does patient follow with Endocrinology: No  Last optometry exam: 2/5/25 with Sh[facility] at [facility]  Most recent visit in Podiatry: 2/10/25 -- patient reports a sore on his foot today, which was seen by podiatry with follow-up next week.       Current diabetic medications include:  Metformin  mg every 12 hours  Glipizide 5 XL mg two tablets with breakfast and 1 tablet with dinner  Jardiance 25 mg daily    Clarifications to above regimen: None  Adverse Effects: None    Glucose Readings:  Patient tests BG 1 time per day    Current home BG readings: Usually 130-140 recently, 161 this morning. Higher when he eats pasta or has some candy after dinner.   Previous home BG readings: Usually 130-140 recently, 134 this morning. Higher when he eats pasta or has some candy after dinner.    Any episodes of hypoglycemia? No.  Did patient treat episode of hypoglycemia appropriately? N/A  Does the patient have a prescription for ready-to-use Glucagon? Not on insulin  Does pt have proteinuria? No    Lifestyle:  Tries to keep low-carb, however does indulge in pasta a few times a week.  Snacks: Ice cream, candy  Drinks: Coke, lemonade, cocktail occasionally  Physical Activity: 20 mins daily, mostly leg exercises    Secondary Prevention:  Statin? Yes  ACE-I/ARB? Yes  Aspirin? No    Pertinent PMH Review:  PMH of Pancreatitis: No  PMH of Retinopathy: No  PMH of Urinary Tract Infections: No  PMH of MTC: No    Immunizations:  Influenza? Yes  COVID? Yes  Pneumonia? No  Shingles? No  Hepatitis B? No     Drug Interactions  No relevant drug  interactions were noted.    Medication System Management  Patient's preferred pharmacy: [facility]  Adherence/Organization: N/A  Affordability/Accessibility: Not eligible for [facility] due to income    Objective   No Known Allergies  Social History     Social History Narrative    Lives in an apt with his wife     Retired at age 67 and worked part time until 83           Medication Review  Current Outpatient Medications   Medication Instructions    atorvastatin (LIPITOR) 40 mg, oral, Daily    cholecalciferol (Vitamin D-3) 50 MCG (2000 UT) tablet 1 tablet, Daily    Eliquis 2.5 mg, oral, 2 times daily    empagliflozin (JARDIANCE) 25 mg, oral, Daily, Dispense after 1/1/25    glipiZIDE XL (GLUCOTROL XL) 5 mg, Daily    hydroCHLOROthiazide (HYDRODIURIL) 25 mg, oral, Daily    latanoprost (Xalatan) 0.005 % ophthalmic solution     losartan (COZAAR) 25 mg, Daily    metFORMIN (GLUCOPHAGE) 500 mg, Every 12 hours    metoprolol succinate XL (TOPROL-XL) 25 mg, oral, Daily    omeprazole (PriLOSEC) 20 mg DR capsule Daily before breakfast      Vitals  BP Readings from Last 2 Encounters:   12/16/24 117/75   12/11/24 130/77     BMI Readings from Last 1 Encounters:   12/16/24 30.36 kg/m²      Labs  A1C  Lab Results   Component Value Date    HGBA1C 8.4 (H) 12/09/2024    HGBA1C 9.1 (H) 07/15/2024    HGBA1C 7.8 (A) 07/28/2023     BMP  Lab Results   Component Value Date    CALCIUM 9.6 12/09/2024     12/09/2024    K 4.2 12/09/2024    CO2 26 12/09/2024     12/09/2024    BUN 40 (H) 12/09/2024    CREATININE 2.45 (H) 12/09/2024    EGFR 25 (L) 12/09/2024     LFTs  Lab Results   Component Value Date    ALT 24 09/07/2023    AST 24 09/07/2023    ALKPHOS 85 09/07/2023    BILITOT 1.0 09/07/2023     FLP  Lab Results   Component Value Date    TRIG 178 (H) 07/15/2024    CHOL 140 07/15/2024    LDLF 95 07/28/2023    LDLCALC 63 07/15/2024    HDL 41.6 07/15/2024     Urine Microalbumin  Lab Results   Component Value Date    MICROALBCREA   12/09/2024      Comment:      One or more analytes used in this calculation is outside of the analytical measurement range. Calculation cannot be performed.     Weight Management  Wt Readings from Last 3 Encounters:   12/16/24 93.3 kg (205 lb 9.6 oz)   12/11/24 93 kg (205 lb)   10/01/24 91.8 kg (202 lb 4.8 oz)      There is no height or weight on file to calculate BMI.     Assessment/Plan   Problem List Items Addressed This Visit       Diabetes (Multi)     Patient's goal A1c is < 7%.  Is pt at goal? No, most recent A1C was 8.4% - improved from 9.1% prior.  Patient's SMBGs are relatively controlled with occasional excursions due to dietary indiscretions. Blood sugars are better controlled with increased Jardiance dose, although still not at goal. Will increase glipizide. Encouraged patient to keep up with healthy lifestyle. Follow up in one month per patient's request.    Medication Changes:  CONTINUE  Metformin  mg twice daily  Jardiance 25 mg daily  INCREASE  Glipizide XL to 5 mg 2 tablets with breakfast and 2 tablets with dinner         Relevant Orders    Referral to Clinical Pharmacy     Clinical Pharmacist follow-up: 3/21/25, Telehealth visit    Continue all meds under the continuation of care with the referring provider and clinical pharmacy team.    Thank you,  Veena Santacruz, PharmD  Clinical Pharmacist  [phone]    Verbal consent to manage patient's drug therapy was obtained from the patient. They were informed they may decline to participate or withdraw from participation in pharmacy services at any time.

## 2025-02-21 NOTE — ASSESSMENT & PLAN NOTE
Patient's goal A1c is < 7%.  Is pt at goal? No, most recent A1C was 8.4% - improved from 9.1% prior.  Patient's SMBGs are relatively controlled with occasional excursions due to dietary indiscretions. Blood sugars are better controlled with increased Jardiance dose, although still not at goal. Will increase glipizide. Encouraged patient to keep up with healthy lifestyle. Follow up in one month per patient's request.    Medication Changes:  CONTINUE  Metformin  mg twice daily  Jardiance 25 mg daily  INCREASE  Glipizide XL to 5 mg 2 tablets with breakfast and 2 tablets with dinner

## 2025-03-05 ENCOUNTER — TELEPHONE (OUTPATIENT)
Dept: PRIMARY CARE | Facility: CLINIC | Age: 85
End: 2025-03-05
Payer: MEDICARE

## 2025-03-06 DIAGNOSIS — E11.69 TYPE 2 DIABETES MELLITUS WITH OTHER SPECIFIED COMPLICATION, WITHOUT LONG-TERM CURRENT USE OF INSULIN: Primary | ICD-10-CM

## 2025-03-06 RX ORDER — METFORMIN HYDROCHLORIDE 500 MG/1
1000 TABLET, EXTENDED RELEASE ORAL
Qty: 200 TABLET | Refills: 3 | Status: SHIPPED | OUTPATIENT
Start: 2025-03-06 | End: 2026-04-10

## 2025-03-14 DIAGNOSIS — E11.69 TYPE 2 DIABETES MELLITUS WITH OTHER SPECIFIED COMPLICATION, WITHOUT LONG-TERM CURRENT USE OF INSULIN: Primary | ICD-10-CM

## 2025-03-14 RX ORDER — GLIPIZIDE 10 MG/1
10 TABLET, FILM COATED, EXTENDED RELEASE ORAL
Qty: 180 TABLET | Refills: 1 | Status: SHIPPED | OUTPATIENT
Start: 2025-03-14

## 2025-03-18 ENCOUNTER — TELEPHONE (OUTPATIENT)
Dept: PRIMARY CARE | Facility: CLINIC | Age: 85
End: 2025-03-18
Payer: MEDICARE

## 2025-03-21 ENCOUNTER — APPOINTMENT (OUTPATIENT)
Dept: PHARMACY | Facility: HOSPITAL | Age: 85
End: 2025-03-21
Payer: MEDICARE

## 2025-03-21 DIAGNOSIS — E11.69 TYPE 2 DIABETES MELLITUS WITH OTHER SPECIFIED COMPLICATION, WITHOUT LONG-TERM CURRENT USE OF INSULIN: ICD-10-CM

## 2025-03-21 NOTE — ASSESSMENT & PLAN NOTE
Patient's goal A1c is < 7%.  Is pt at goal? No, most recent A1C was 8.4% - improved from 9.1% prior.  Patient's SMBGs are more uncontrolled recently due to dietary indiscretions and running low on metformin and taking one tablet daily vs twice daily. Encouraged patient to keep up with a healthy lifestyle and cut back on dietary indiscretions.     Medication Changes:  CONTINUE  Metformin  mg twice daily  Jardiance 25 mg daily  Glipizide XL 10 mg before breakfast and dinner

## 2025-03-21 NOTE — PROGRESS NOTES
Clinical Pharmacy Appointment    Patient ID: Mauro Grande is a 84 y.o. male who presents for Diabetes.    Pt is here for Follow Up appointment.     Referring Provider: Maggie Gutierrez,*  PCP: Maggie Gutierrez MD   Last visit with PCP: 12/16/24   Next visit with PCP: 4/17/25    Subjective     HPI  DIABETES MELLITUS TYPE 2:    Known diabetic complications: CKD.  Does patient follow with Endocrinology: No  Last optometry exam: 2/5/25 with Shante Jones at [facility]  Most recent visit in Podiatry: 3/14/25 -- patient reports a sore on his foot today that has improved greatly over the past 2 months.     Current diabetic medications include:  Metformin XR 1000 mg daily  Glipizide 10 XL mg one tablet before breakfast and dinner  Jardiance 25 mg daily    Clarifications to above regimen: None  Adverse Effects: None    Glucose Readings:  Patient tests BG 1 time per day    Current home BG readings: 159-206 in the week leading up to [facility] day. 109-161 prior. 150-160s since 3/17.   Previous home BG readings: Usually 130-140 recently, 161 this morning. Higher when he eats pasta or has some candy after dinner.    Any episodes of hypoglycemia? No.  Did patient treat episode of hypoglycemia appropriately? N/A  Does the patient have a prescription for ready-to-use Glucagon? Not on insulin  Does pt have proteinuria? No    Lifestyle:  Tries to keep low-carb, however does indulge in pasta a few times a week.  Snacks: Ice cream, candy  Drinks: Coke, lemonade, cocktail occasionally  Physical Activity: 20 mins daily, mostly leg exercises    Secondary Prevention:  Statin? Yes  ACE-I/ARB? Yes  Aspirin? No    Pertinent PMH Review:  PMH of Pancreatitis: No  PMH of Retinopathy: No  PMH of Urinary Tract Infections: No  PMH of MTC: No    Immunizations:  Influenza? Yes  COVID? Yes  Pneumonia? No  Shingles? No  Hepatitis B? No     Drug Interactions  No relevant drug interactions were noted.    Medication System  Management  Patient's preferred pharmacy: [facility]  Adherence/Organization: N/A  Affordability/Accessibility: Not eligible for  PAP due to income    Objective   No Known Allergies  Social History     Social History Narrative    Lives in an apt with his wife     Retired at age 67 and worked part time until 83           Medication Review  Current Outpatient Medications   Medication Instructions    atorvastatin (LIPITOR) 40 mg, oral, Daily    cholecalciferol (Vitamin D-3) 50 MCG (2000 UT) tablet 1 tablet, Daily    Eliquis 2.5 mg, oral, 2 times daily    empagliflozin (JARDIANCE) 25 mg, oral, Daily, Dispense after 1/1/25    glipiZIDE XL (GLUCOTROL XL) 10 mg, oral, 2 times daily before meals    hydroCHLOROthiazide (HYDRODIURIL) 25 mg, oral, Daily    latanoprost (Xalatan) 0.005 % ophthalmic solution     losartan (COZAAR) 25 mg, Daily    metFORMIN XR (GLUCOPHAGE-XR) 1,000 mg, oral, Daily with breakfast, Do not crush, chew, or split.    metoprolol succinate XL (TOPROL-XL) 25 mg, oral, Daily    omeprazole (PriLOSEC) 20 mg DR capsule Daily before breakfast      Vitals  BP Readings from Last 2 Encounters:   12/16/24 117/75   12/11/24 130/77     BMI Readings from Last 1 Encounters:   12/16/24 30.36 kg/m²      Labs  A1C  Lab Results   Component Value Date    HGBA1C 8.4 (H) 12/09/2024    HGBA1C 9.1 (H) 07/15/2024    HGBA1C 7.8 (A) 07/28/2023     BMP  Lab Results   Component Value Date    CALCIUM 9.6 12/09/2024     12/09/2024    K 4.2 12/09/2024    CO2 26 12/09/2024     12/09/2024    BUN 40 (H) 12/09/2024    CREATININE 2.45 (H) 12/09/2024    EGFR 25 (L) 12/09/2024     LFTs  Lab Results   Component Value Date    ALT 24 09/07/2023    AST 24 09/07/2023    ALKPHOS 85 09/07/2023    BILITOT 1.0 09/07/2023     FLP  Lab Results   Component Value Date    TRIG 178 (H) 07/15/2024    CHOL 140 07/15/2024    LDLF 95 07/28/2023    LDLCALC 63 07/15/2024    HDL 41.6 07/15/2024     Urine Microalbumin  Lab Results   Component  Value Date    MICROALBCREA  12/09/2024      Comment:      One or more analytes used in this calculation is outside of the analytical measurement range. Calculation cannot be performed.     Weight Management  Wt Readings from Last 3 Encounters:   12/16/24 93.3 kg (205 lb 9.6 oz)   12/11/24 93 kg (205 lb)   10/01/24 91.8 kg (202 lb 4.8 oz)      There is no height or weight on file to calculate BMI.     Assessment/Plan   Problem List Items Addressed This Visit       Diabetes (Multi)     Patient's goal A1c is < 7%.  Is pt at goal? No, most recent A1C was 8.4% - improved from 9.1% prior.  Patient's SMBGs are more uncontrolled recently due to dietary indiscretions and running low on metformin and taking one tablet daily vs twice daily. Encouraged patient to keep up with a healthy lifestyle and cut back on dietary indiscretions.     Medication Changes:  CONTINUE  Metformin  mg twice daily  Jardiance 25 mg daily  Glipizide XL 10 mg before breakfast and dinner         Relevant Orders    Referral to Clinical Pharmacy     Clinical Pharmacist follow-up: 4/25/25, Telehealth visit    Continue all meds under the continuation of care with the referring provider and clinical pharmacy team.    Thank you,  Veena Santacruz, PharmD  Clinical Pharmacist  [phone]    Verbal consent to manage patient's drug therapy was obtained from the patient. They were informed they may decline to participate or withdraw from participation in pharmacy services at any time.

## 2025-04-07 ENCOUNTER — OFFICE VISIT (OUTPATIENT)
Dept: CARDIOLOGY | Facility: CLINIC | Age: 85
End: 2025-04-07
Payer: MEDICARE

## 2025-04-07 VITALS
OXYGEN SATURATION: 97 % | HEIGHT: 69 IN | SYSTOLIC BLOOD PRESSURE: 101 MMHG | DIASTOLIC BLOOD PRESSURE: 53 MMHG | WEIGHT: 207.5 LBS | HEART RATE: 109 BPM | BODY MASS INDEX: 30.73 KG/M2

## 2025-04-07 DIAGNOSIS — I25.10 CAD IN NATIVE ARTERY: Primary | ICD-10-CM

## 2025-04-07 PROCEDURE — 1160F RVW MEDS BY RX/DR IN RCRD: CPT | Performed by: INTERNAL MEDICINE

## 2025-04-07 PROCEDURE — 3074F SYST BP LT 130 MM HG: CPT | Performed by: INTERNAL MEDICINE

## 2025-04-07 PROCEDURE — 99214 OFFICE O/P EST MOD 30 MIN: CPT | Performed by: INTERNAL MEDICINE

## 2025-04-07 PROCEDURE — 1036F TOBACCO NON-USER: CPT | Performed by: INTERNAL MEDICINE

## 2025-04-07 PROCEDURE — 99214 OFFICE O/P EST MOD 30 MIN: CPT | Mod: 25 | Performed by: INTERNAL MEDICINE

## 2025-04-07 PROCEDURE — 1126F AMNT PAIN NOTED NONE PRSNT: CPT | Performed by: INTERNAL MEDICINE

## 2025-04-07 PROCEDURE — 3078F DIAST BP <80 MM HG: CPT | Performed by: INTERNAL MEDICINE

## 2025-04-07 PROCEDURE — 93010 ELECTROCARDIOGRAM REPORT: CPT | Performed by: INTERNAL MEDICINE

## 2025-04-07 PROCEDURE — 1159F MED LIST DOCD IN RCRD: CPT | Performed by: INTERNAL MEDICINE

## 2025-04-07 PROCEDURE — 93005 ELECTROCARDIOGRAM TRACING: CPT | Performed by: INTERNAL MEDICINE

## 2025-04-07 ASSESSMENT — PAIN SCALES - GENERAL: PAINLEVEL_OUTOF10: 0-NO PAIN

## 2025-04-07 ASSESSMENT — ENCOUNTER SYMPTOMS
DEPRESSION: 0
OCCASIONAL FEELINGS OF UNSTEADINESS: 1
LOSS OF SENSATION IN FEET: 0

## 2025-04-07 NOTE — PROGRESS NOTES
Primary Care Physician: Maggie Gutierrez MD  Date of Visit: 04/07/2025  2:00 PM EDT  Location of visit: [facility] [address]     Chief Complaint:   Coronary artery disease management.  Laboratory studies reviewed       HPI / Summary:   Mauro Grande is a 85 y.o. male presents for followup.     6-month office follow-up evaluation 2019 off-pump CABG two-vessel.  Off-pump LIMA sequentially to the LAD diagonal  DM 2, CKD 3, hypertension, dyslipidemia, WES, A-fib on Eliquis.    CKD recent creatinine December 2024 up to 2.45  Stable VARGAS, no cp, nonexertional shoulder pain.    Specialty Problems          Cardiology Problems    Abnormal stress electrocardiogram test    Accelerating angina (Multi)    CAD in native artery    HLD (hyperlipidemia)    Hypertension    New onset a-fib (Multi)    Palpitations    S/P CABG x 2        Past Medical History:   Diagnosis Date    Diabetes mellitus (Multi)     Hyperlipemia     Hypertension           Past Surgical History:   Procedure Laterality Date    MR HEAD ANGIO WO IV CONTRAST  10/23/2016    MR HEAD ANGIO WO IV CONTRAST 10/23/2016 [facility]Y    NOSE SURGERY      TONSILLECTOMY      VASECTOMY            Social History:   reports that he has never smoked. He has never used smokeless tobacco. He reports current alcohol use. He reports that he does not use drugs.      Allergies:  No Known Allergies    Outpatient Medications:  Current Outpatient Medications   Medication Instructions    atorvastatin (LIPITOR) 40 mg, oral, Daily    cholecalciferol (Vitamin D-3) 50 MCG (2000 UT) tablet 1 tablet, Daily    Eliquis 2.5 mg, oral, 2 times daily    empagliflozin (JARDIANCE) 25 mg, oral, Daily, Dispense after 1/1/25    glipiZIDE XL (GLUCOTROL XL) 10 mg, oral, 2 times daily before meals    hydroCHLOROthiazide (HYDRODIURIL) 25 mg, oral, Daily    latanoprost (Xalatan) 0.005 % ophthalmic solution     losartan (COZAAR) 25 mg, Daily    metFORMIN XR (GLUCOPHAGE-XR) 1,000 mg, oral, Daily with  breakfast, Do not crush, chew, or split.    metoprolol succinate XL (TOPROL-XL) 25 mg, oral, Daily    omeprazole (PriLOSEC) 20 mg DR capsule Daily before breakfast       ROS     Physical Exam:  There were no vitals filed for this visit.  Wt Readings from Last 5 Encounters:   12/16/24 93.3 kg (205 lb 9.6 oz)   12/11/24 93 kg (205 lb)   10/01/24 91.8 kg (202 lb 4.8 oz)   09/23/24 92.7 kg (204 lb 6.4 oz)   07/15/24 95.9 kg (211 lb 6.4 oz)     There is no height or weight on file to calculate BMI.   Physical Exam   Well-appearing male in no acute distress.  Flat JVP.  Normal carotid upstrokes.  Irregular rhythm without gallop.  Clear lungs.  Soft abdomen.  No dependent edema  Last Labs:  CMP:  Recent Labs     12/09/24  1539 07/15/24  1434 09/07/23  1208 08/16/23  1210 07/28/23  1126    136 141 141 140   K 4.2 3.8 4.8 4.5 4.1    100 103 103 102   CO2 26 26 27 26 24   ANIONGAP 15 14 16 17 18   BUN 40* 34* 35* 36* 33*   CREATININE 2.45* 1.97* 2.21* 1.96* 1.99*   EGFR 25* 33*  --   --   --    GLUCOSE 230* 280* 195* 223* 211*     Recent Labs     09/07/23  1208 08/16/23  1210 07/28/23  1126 04/11/23  0611 04/05/23  1948 08/31/22  1450 06/16/22  1139 05/26/22  1357 05/06/22  1251 05/13/21  1451 04/28/21  2205 06/24/20  1310 05/07/20  1059   ALBUMIN 4.3 4.2 4.3 3.2* 3.8   < > 3.9   < > 3.9   < > 4.1   < > 4.3   ALKPHOS 85 79 89 67 81   < > 85   < > 106   < > 98   < > 98   ALT 24 15 17 27 22   < > 22   < > 25   < > 20   < > 30   AST 24 18 20 23 19   < > 21   < > 27   < > 20   < > 31   BILITOT 1.0 0.9 0.7 0.9 1.1   < > 0.7   < > 1.0   < > 0.8   < > 1.0   LIPASE  --   --  55  --   --   --  49  --  82  --  85*  --  63    < > = values in this interval not displayed.     CBC:  Recent Labs     07/15/24  1434 09/07/23  1208 08/16/23  1210 07/28/23  1126 04/11/23  0611   WBC 16.6* 13.2* 12.4* 14.5* 10.8   HGB 13.1* 14.1 13.0* 14.3 11.8*   HCT 39.5* 42.7 41.7 43.0 36.5*    203 196 229 212   MCV 95 97 103* 97 97      COAG:   Recent Labs     04/05/23  1948 04/28/21  2205 09/19/18  0942 09/18/18  0627 09/17/18  0617   INR 1.2* 1.2* 1.1 1.0 1.0     HEME/ENDO:  Recent Labs     12/09/24  1539 07/15/24  1434 07/28/23  1126 06/16/22  1139 05/06/22  1251 09/17/18  1830 09/15/18  0246   IRONSAT  --   --   --   --   --   --  16*   TSH  --  2.23 3.84 3.33 1.85  --   --    HGBA1C 8.4* 9.1* 7.8*  --  8.0*   < >  --     < > = values in this interval not displayed.      CARDIAC:   Recent Labs     04/05/23  1948   TROPHS 11     Recent Labs     07/15/24  1434 07/28/23  1126 05/06/22  1251   CHOL 140 180 162   LDLF  --  95 99   HDL 41.6 38.1* 34.4*   TRIG 178* 236* 143       Last Cardiology Tests:  ECG:    Atrial fibrillation with moderate LVH inferior infarct no acute ST or T wave changes  Echo:  Echo Results:  No results found for this or any previous visit from the past 3650 days.       Cath:      Stress Test:  Stress Results:  No results found for this or any previous visit from the past 365 days.         Cardiac Imaging:  ECG 12 lead (Clinic Performed)  Sinus rhythm with occasional Premature ventricular complexes and Possible Premature atrial complexes with Aberrant conduction  Inferior infarct , age undetermined  Abnormal ECG  When compared with ECG of 05-APR-2023 19:41,  Premature ventricular complexes are now Present  Nonspecific T wave abnormality now evident in Lateral leads  Confirmed by Smith dAen ([id number]) on 4/4/2024 10:37:44 AM        Assessment/Plan     85-year-old male status post CABG in 2018, CKD 4, multiple other medical issues, no active cardiac complaints no signs of heart failure, has not had a recent echo but since he is generally functionally stable I did not recommend any further diagnostic imaging.  He is on dose adjusted Eliquis for his atrial fibrillation.  I will see him again in 6 months      Orders:  No orders of the defined types were placed in this encounter.     Followup Appts:  Future Appointments   Date  Time Provider Department [place]   4/17/2025  1:00 PM Maggie Gutierrez MD [id number] [facility]   4/25/2025  1:00 PM Artur BorreroD [id number] [facility]   6/18/2025 11:20 AM Bruce Kline MD [id number] [facility]           ____________________________________________________________  Smith Aden MD    Senior Attending Physician  [facility]  [facility]

## 2025-04-08 LAB
EST. AVERAGE GLUCOSE BLD GHB EST-MCNC: 223 MG/DL
EST. AVERAGE GLUCOSE BLD GHB EST-SCNC: 12.4 MMOL/L
HBA1C MFR BLD: 9.4 % OF TOTAL HGB

## 2025-04-09 LAB
ATRIAL RATE: 178 BPM
Q ONSET: 229 MS
QRS COUNT: 17 BEATS
QRS DURATION: 78 MS
QT INTERVAL: 356 MS
QTC CALCULATION(BAZETT): 479 MS
QTC FREDERICIA: 434 MS
R AXIS: 3 DEGREES
T AXIS: 8 DEGREES
T OFFSET: 407 MS
VENTRICULAR RATE: 109 BPM

## 2025-04-17 ENCOUNTER — APPOINTMENT (OUTPATIENT)
Dept: PRIMARY CARE | Facility: CLINIC | Age: 85
End: 2025-04-17
Payer: MEDICARE

## 2025-04-17 VITALS
BODY MASS INDEX: 30.27 KG/M2 | OXYGEN SATURATION: 95 % | SYSTOLIC BLOOD PRESSURE: 114 MMHG | WEIGHT: 204.4 LBS | DIASTOLIC BLOOD PRESSURE: 66 MMHG | HEIGHT: 69 IN | HEART RATE: 82 BPM

## 2025-04-17 DIAGNOSIS — E11.69 TYPE 2 DIABETES MELLITUS WITH OTHER SPECIFIED COMPLICATION, WITHOUT LONG-TERM CURRENT USE OF INSULIN: Primary | ICD-10-CM

## 2025-04-17 DIAGNOSIS — N18.32 STAGE 3B CHRONIC KIDNEY DISEASE (MULTI): ICD-10-CM

## 2025-04-17 PROCEDURE — 1036F TOBACCO NON-USER: CPT | Performed by: STUDENT IN AN ORGANIZED HEALTH CARE EDUCATION/TRAINING PROGRAM

## 2025-04-17 PROCEDURE — 99213 OFFICE O/P EST LOW 20 MIN: CPT | Performed by: STUDENT IN AN ORGANIZED HEALTH CARE EDUCATION/TRAINING PROGRAM

## 2025-04-17 PROCEDURE — 1160F RVW MEDS BY RX/DR IN RCRD: CPT | Performed by: STUDENT IN AN ORGANIZED HEALTH CARE EDUCATION/TRAINING PROGRAM

## 2025-04-17 PROCEDURE — 1159F MED LIST DOCD IN RCRD: CPT | Performed by: STUDENT IN AN ORGANIZED HEALTH CARE EDUCATION/TRAINING PROGRAM

## 2025-04-17 PROCEDURE — 3074F SYST BP LT 130 MM HG: CPT | Performed by: STUDENT IN AN ORGANIZED HEALTH CARE EDUCATION/TRAINING PROGRAM

## 2025-04-17 PROCEDURE — G2211 COMPLEX E/M VISIT ADD ON: HCPCS | Performed by: STUDENT IN AN ORGANIZED HEALTH CARE EDUCATION/TRAINING PROGRAM

## 2025-04-17 PROCEDURE — 3078F DIAST BP <80 MM HG: CPT | Performed by: STUDENT IN AN ORGANIZED HEALTH CARE EDUCATION/TRAINING PROGRAM

## 2025-04-17 NOTE — PROGRESS NOTES
"Subjective   Patient ID: Mauro Grande is a 85 y.o. male who presents for Follow-up (4 month follow-up. ).        HPI    85-year-old male with hypertension, hyperlipidemia, coronary artery disease s/p CABG in 2018, type 2 diabetes, CKD stage /IV, A-fib        Fu on dm2   A1c worsened to 9.4 frrm 8.4 in dec   After he had his recent results on 4/7 ,he reduced his soft drinks and noticed improvement in Bgs         Visit Vitals  /66   Pulse 82   Ht 1.753 m (5' 9\")   Wt 92.7 kg (204 lb 6.4 oz)   SpO2 95%   BMI 30.18 kg/m²   Smoking Status Never   BSA 2.12 m²      No LMP for male patient.   Current Outpatient Medications   Medication Instructions    atorvastatin (LIPITOR) 40 mg, oral, Daily    cholecalciferol (Vitamin D-3) 50 MCG (2000 UT) tablet 1 tablet, Daily    Eliquis 2.5 mg, oral, 2 times daily    empagliflozin (JARDIANCE) 25 mg, oral, Daily, Dispense after 1/1/25    glipiZIDE XL (GLUCOTROL XL) 10 mg, oral, 2 times daily before meals    hydroCHLOROthiazide (HYDRODIURIL) 25 mg, oral, Daily    latanoprost (Xalatan) 0.005 % ophthalmic solution     losartan (COZAAR) 25 mg, Daily    metFORMIN XR (GLUCOPHAGE-XR) 1,000 mg, oral, Daily with breakfast, Do not crush, chew, or split.    metoprolol succinate XL (TOPROL-XL) 25 mg, oral, Daily    omeprazole (PriLOSEC) 20 mg DR capsule Daily before breakfast      Social History     Tobacco Use    Smoking status: Never    Smokeless tobacco: Never   Substance Use Topics    Alcohol use: Yes     Comment: Occasionally        Review of Systems    Constitutional : No feeling poorly / fevers/ chills / night sweats/ fatigue   Cardiovascular : No CP /Palpitations/ lower extremity edema / syncope   Endo : No polyuria/polydipsia/ muscle weakness / sluggishness   CNS: No confusion / HA/ tingling/ numbness/ weakness of extremities  Psychiatric: No anxiety/ depression/ SI/HI    All other systems have been reviewed and are negative for complaint       Physical Exam    Constitutional : " Vitals reviewed. Alert and in no distress  Cardiovascular : RRR, Normal S1, S2, no peripheral edema   Pulmonary: No respiratory distress, CTAB   Neurologic : CNs 2-12 grossly intact , no obvious FNDs  Psych : A,Ox3, normal mood and affect      Assessment/Plan   Diagnoses and all orders for this visit:  Type 2 diabetes mellitus with other specified complication, without long-term current use of insulin  -     Hemoglobin A1C; Future  Stage 3b chronic kidney disease (Multi)      85-year-old male with hypertension, hyperlipidemia, coronary artery disease s/p CABG in 2018, type 2 diabetes, CKD stage /IV, A-fib          DM2 : not at goal   He is aware of the need to make dietary changes   No med changes as of today     A1c from 4/7/25 reviewed and discussed with pt         Conditions addressed and mgmt as noted above.  Pertinent labs, images/ imaging reports , chart review was done .   Age appropriate labs / labs for mgmt of chronic medical conditions ordered, further mgmt pending the results.         RTO in  July  or sooner if needed . Labs to be done few days prior to the next visit.        This note is intended for the physician writing it, as well as to communicate findings to other healthcare professionals. These notes use medical lexicon that may be misunderstood by non medical persons. Therefore, interpretations of medical notes and terminology should be approached with caution.

## 2025-04-24 NOTE — PROGRESS NOTES
Clinical Pharmacy Appointment    Patient ID: Mauro Grande is a 85 y.o. male who presents for Diabetes.    Pt is here for Follow Up appointment.     Referring Provider: Maggie Gutierrez,*  PCP: Maggie Gutierrez MD   Last visit with PCP: 4/17/25   Next visit with PCP: 7/21/25    Subjective     HPI  DIABETES MELLITUS TYPE 2:    Known diabetic complications: CKD.  Does patient follow with Endocrinology: No  Last optometry exam: 2/5/25 with Shante Jones at [facility]  Most recent visit in Podiatry: 3/14/25 -- patient reports a sore on his foot today that has improved greatly over the past 2 months.     Current diabetic medications include:  Metformin XR 1000 mg daily  Glipizide 10 XL mg one tablet before breakfast and dinner  Jardiance 25 mg daily    Clarifications to above regimen: None  Adverse Effects: None    Glucose Readings:  Patient tests BG 1 time per day    Current home BG readings:   Date Fasting BG Afternoon BG   4/25 119    4/24 134    4/23 69 152   4/22 69 165   4/21 97    4/20 109    4/19 129    4/18 177      Previous home BG readings: 159-206 in the week leading up to [facility] day. 109-161 prior. 150-160s since 3/17.     Any episodes of hypoglycemia? Yes, readings of 69 on 4/22 and 4/23.  Did patient treat episode of hypoglycemia appropriately? Yes  Does the patient have a prescription for ready-to-use Glucagon? Not on insulin  Does pt have proteinuria? No    Lifestyle:  Tries to keep low-carb, however does indulge in pasta a few times a week.  Snacks: candy  Drinks: Coke, cocktail occasionally  Physical Activity: 20 mins daily, mostly leg exercises    Secondary Prevention:  Statin? Yes  ACE-I/ARB? Yes  Aspirin? No    Pertinent PMH Review:  PMH of Pancreatitis: No  PMH of Retinopathy: No  PMH of Urinary Tract Infections: No  PMH of MTC: No    Immunizations:  Influenza? Yes  COVID? Yes  Pneumonia? No  Shingles? No  Hepatitis B? No     Drug Interactions  No relevant drug  interactions were noted.    Medication System Management  Patient's preferred pharmacy: [facility]  Adherence/Organization: N/A  Affordability/Accessibility: Not eligible for [facility] due to income    Objective   No Known Allergies  Social History     Social History Narrative    Lives in an apt with his wife     Retired at age 67 and worked part time until 83           Medication Review  Current Outpatient Medications   Medication Instructions    atorvastatin (LIPITOR) 40 mg, oral, Daily    cholecalciferol (Vitamin D-3) 50 MCG (2000 UT) tablet 1 tablet, Daily    Eliquis 2.5 mg, oral, 2 times daily    empagliflozin (JARDIANCE) 25 mg, oral, Daily, Dispense after 1/1/25    glipiZIDE XL (GLUCOTROL XL) 10 mg, oral, 2 times daily before meals    hydroCHLOROthiazide (HYDRODIURIL) 25 mg, oral, Daily    latanoprost (Xalatan) 0.005 % ophthalmic solution     losartan (COZAAR) 25 mg, Daily    metFORMIN XR (GLUCOPHAGE-XR) 1,000 mg, oral, Daily with breakfast, Do not crush, chew, or split.    metoprolol succinate XL (TOPROL-XL) 25 mg, oral, Daily    omeprazole (PriLOSEC) 20 mg DR capsule Daily before breakfast      Vitals  BP Readings from Last 2 Encounters:   04/17/25 114/66   04/07/25 101/53     BMI Readings from Last 1 Encounters:   04/17/25 30.18 kg/m²      Labs  A1C  Lab Results   Component Value Date    HGBA1C 9.4 (H) 04/07/2025    HGBA1C 8.4 (H) 12/09/2024    HGBA1C 9.1 (H) 07/15/2024     BMP  Lab Results   Component Value Date    CALCIUM 9.6 12/09/2024     12/09/2024    K 4.2 12/09/2024    CO2 26 12/09/2024     12/09/2024    BUN 40 (H) 12/09/2024    CREATININE 2.45 (H) 12/09/2024    EGFR 25 (L) 12/09/2024     LFTs  Lab Results   Component Value Date    ALT 24 09/07/2023    AST 24 09/07/2023    ALKPHOS 85 09/07/2023    BILITOT 1.0 09/07/2023     FLP  Lab Results   Component Value Date    TRIG 178 (H) 07/15/2024    CHOL 140 07/15/2024    LDLF 95 07/28/2023    LDLCALC 63 07/15/2024    HDL 41.6 07/15/2024      Urine Microalbumin  Lab Results   Component Value Date    MICROALBCREA  12/09/2024      Comment:      One or more analytes used in this calculation is outside of the analytical measurement range. Calculation cannot be performed.     Weight Management  Wt Readings from Last 3 Encounters:   04/17/25 92.7 kg (204 lb 6.4 oz)   04/07/25 94.1 kg (207 lb 8 oz)   12/16/24 93.3 kg (205 lb 9.6 oz)      There is no height or weight on file to calculate BMI.     Assessment/Plan   Problem List Items Addressed This Visit       Diabetes (Multi)    Patient's goal A1c is < 7%.  Is pt at goal? No, A1C worsened to 9.4%.  Patient's SMBGs have improved over the past week since PCP visit. He has cut out lemonade and ice cream. He is working on eating a more moderate diet, which is reflected in the readings this week. Encouraged patient to keep up the good work and will follow up in one month.    Medication Changes:  CONTINUE  Metformin  mg twice daily  Jardiance 25 mg daily  Glipizide XL 10 mg before breakfast and dinner         Relevant Orders    Referral to Clinical Pharmacy     Clinical Pharmacist follow-up: 5/22/25, Telehealth visit    Continue all meds under the continuation of care with the referring provider and clinical pharmacy team.    Thank you,  Veena Santacruz, PharmD  Clinical Pharmacist  [phone]    Verbal consent to manage patient's drug therapy was obtained from the patient. They were informed they may decline to participate or withdraw from participation in pharmacy services at any time.

## 2025-04-25 ENCOUNTER — APPOINTMENT (OUTPATIENT)
Dept: PHARMACY | Facility: HOSPITAL | Age: 85
End: 2025-04-25
Payer: MEDICARE

## 2025-04-25 DIAGNOSIS — I10 PRIMARY HYPERTENSION: ICD-10-CM

## 2025-04-25 DIAGNOSIS — E11.69 TYPE 2 DIABETES MELLITUS WITH OTHER SPECIFIED COMPLICATION, WITHOUT LONG-TERM CURRENT USE OF INSULIN: ICD-10-CM

## 2025-04-25 NOTE — ASSESSMENT & PLAN NOTE
Patient's goal A1c is < 7%.  Is pt at goal? No, A1C worsened to 9.4%.  Patient's SMBGs have improved over the past week since PCP visit. He has cut out lemonade and ice cream. He is working on eating a more moderate diet, which is reflected in the readings this week. Encouraged patient to keep up the good work and will follow up in one month.    Medication Changes:  CONTINUE  Metformin  mg twice daily  Jardiance 25 mg daily  Glipizide XL 10 mg before breakfast and dinner

## 2025-04-28 RX ORDER — HYDROCHLOROTHIAZIDE 25 MG/1
25 TABLET ORAL DAILY
Qty: 90 TABLET | Refills: 0 | Status: SHIPPED | OUTPATIENT
Start: 2025-04-28

## 2025-05-14 DIAGNOSIS — E78.5 HYPERLIPIDEMIA, UNSPECIFIED HYPERLIPIDEMIA TYPE: ICD-10-CM

## 2025-05-14 RX ORDER — ATORVASTATIN CALCIUM 40 MG/1
40 TABLET, FILM COATED ORAL DAILY
Qty: 90 TABLET | Refills: 3 | Status: SHIPPED | OUTPATIENT
Start: 2025-05-14

## 2025-05-22 ENCOUNTER — APPOINTMENT (OUTPATIENT)
Dept: PHARMACY | Facility: HOSPITAL | Age: 85
End: 2025-05-22
Payer: MEDICARE

## 2025-05-22 DIAGNOSIS — E11.69 TYPE 2 DIABETES MELLITUS WITH OTHER SPECIFIED COMPLICATION, WITHOUT LONG-TERM CURRENT USE OF INSULIN: ICD-10-CM

## 2025-05-22 NOTE — ASSESSMENT & PLAN NOTE
Patient's goal A1c is < 7%.  Is pt at goal? No, A1C worsened to 9.4%.  Patient's SMBGs are consistently at goal of . He has maintained dietary changes since last visit, including cutting out lemonade, pasta, cake, and ice cream. Encouraged patient to keep up the good work and will follow up in one month per patient's request.    Medication Changes:  CONTINUE  Metformin  mg twice daily  Jardiance 25 mg daily  Glipizide XL 10 mg before breakfast and dinner

## 2025-05-22 NOTE — PROGRESS NOTES
Clinical Pharmacy Appointment    Patient ID: Mauro Grande is a 85 y.o. male who presents for Diabetes.    Pt is here for Follow Up appointment.     Referring Provider: Maggie Gutierrez,*  PCP: Maggie Gutierrez MD   Last visit with PCP: 4/17/25   Next visit with PCP: 7/21/25    Subjective     HPI  DIABETES MELLITUS TYPE 2:    Known diabetic complications: CKD.  Does patient follow with Endocrinology: No  Last optometry exam: 2/5/25 with Shante Jones at [facility]  Most recent visit in Podiatry: 5/16/25 -- patient reports a sore on his foot today that has improved greatly over the past 2 months.     Current diabetic medications include:  Metformin XR 1000 mg daily  Glipizide 10 mg XL mg one tablet before breakfast and dinner  Jardiance 25 mg daily    Clarifications to above regimen: None  Adverse Effects: None    Glucose Readings:  Patient tests BG 1 time per day    Current home BG readings:   Date Fasting BG   5/22 127   5/21 108   5/20 125   5/19 128   5/18 134   5/17 122   5/16 132   5/15 137   5/14 125   5/13 127     Previous home BG readings:  Date Fasting BG Afternoon BG   4/25 119    4/24 134    4/23 69 152   4/22 69 165   4/21 97    4/20 109    4/19 129    4/18 177      Any episodes of hypoglycemia? Yes, readings of 69 on 4/22 and 4/23.    Did patient treat episode of hypoglycemia appropriately? Yes  Does the patient have a prescription for ready-to-use Glucagon? Not on insulin  Does pt have proteinuria? No    Lifestyle:  Tries to keep low-carb. Has cut out pasta and cake  Snacks: candy  Drinks: Coke, cocktail occasionally  Physical Activity: 20 mins daily, mostly leg exercises    Secondary Prevention:  Statin? Yes  ACE-I/ARB? Yes  Aspirin? No    Pertinent PMH Review:  PMH of Pancreatitis: No  PMH of Retinopathy: No  PMH of Urinary Tract Infections: No  PMH of MTC: No    Immunizations:  Influenza? Yes  COVID? Yes  Pneumonia? No  Shingles? No  Hepatitis B? No     Drug Interactions  No  relevant drug interactions were noted.    Medication System Management  Patient's preferred pharmacy: [facility]  Adherence/Organization: N/A  Affordability/Accessibility: Not eligible for [facility] due to income    Objective   No Known Allergies  Social History     Social History Narrative    Lives in an apt with his wife     Retired at age 67 and worked part time until 83           Medication Review  Current Outpatient Medications   Medication Instructions    atorvastatin (LIPITOR) 40 mg, oral, Daily    cholecalciferol (Vitamin D-3) 50 MCG (2000 UT) tablet 1 tablet, Daily    Eliquis 2.5 mg, oral, 2 times daily    empagliflozin (JARDIANCE) 25 mg, oral, Daily, Dispense after 1/1/25    glipiZIDE XL (GLUCOTROL XL) 10 mg, oral, 2 times daily before meals    hydroCHLOROthiazide (HYDRODIURIL) 25 mg, oral, Daily    latanoprost (Xalatan) 0.005 % ophthalmic solution     losartan (COZAAR) 25 mg, Daily    metFORMIN XR (GLUCOPHAGE-XR) 1,000 mg, oral, Daily with breakfast, Do not crush, chew, or split.    metoprolol succinate XL (TOPROL-XL) 25 mg, oral, Daily    omeprazole (PriLOSEC) 20 mg DR capsule Daily before breakfast      Vitals  BP Readings from Last 2 Encounters:   04/17/25 114/66   04/07/25 101/53     BMI Readings from Last 1 Encounters:   04/17/25 30.18 kg/m²      Labs  A1C  Lab Results   Component Value Date    HGBA1C 9.4 (H) 04/07/2025    HGBA1C 8.4 (H) 12/09/2024    HGBA1C 9.1 (H) 07/15/2024     BMP  Lab Results   Component Value Date    CALCIUM 9.6 12/09/2024     12/09/2024    K 4.2 12/09/2024    CO2 26 12/09/2024     12/09/2024    BUN 40 (H) 12/09/2024    CREATININE 2.45 (H) 12/09/2024    EGFR 25 (L) 12/09/2024     LFTs  Lab Results   Component Value Date    ALT 24 09/07/2023    AST 24 09/07/2023    ALKPHOS 85 09/07/2023    BILITOT 1.0 09/07/2023     FLP  Lab Results   Component Value Date    TRIG 178 (H) 07/15/2024    CHOL 140 07/15/2024    LDLF 95 07/28/2023    LDLCALC 63 07/15/2024    HDL  41.6 07/15/2024     Urine Microalbumin  Lab Results   Component Value Date    MICROALBCREA  12/09/2024      Comment:      One or more analytes used in this calculation is outside of the analytical measurement range. Calculation cannot be performed.     Weight Management  Wt Readings from Last 3 Encounters:   04/17/25 92.7 kg (204 lb 6.4 oz)   04/07/25 94.1 kg (207 lb 8 oz)   12/16/24 93.3 kg (205 lb 9.6 oz)      There is no height or weight on file to calculate BMI.     Assessment/Plan   Problem List Items Addressed This Visit       Diabetes (Multi)    Patient's goal A1c is < 7%.  Is pt at goal? No, A1C worsened to 9.4%.  Patient's SMBGs are consistently at goal of . He has maintained dietary changes since last visit, including cutting out lemonade, pasta, cake, and ice cream. Encouraged patient to keep up the good work and will follow up in one month per patient's request.    Medication Changes:  CONTINUE  Metformin  mg twice daily  Jardiance 25 mg daily  Glipizide XL 10 mg before breakfast and dinner         Relevant Orders    Referral to Clinical Pharmacy     Clinical Pharmacist follow-up: 6/20/25, Telehealth visit    Continue all meds under the continuation of care with the referring provider and clinical pharmacy team.    Thank you,  Veena Santacruz, PharmD  Clinical Pharmacist  [phone]    Verbal consent to manage patient's drug therapy was obtained from the patient. They were informed they may decline to participate or withdraw from participation in pharmacy services at any time.

## 2025-06-17 LAB
25(OH)D3+25(OH)D2 SERPL-MCNC: 51 NG/ML (ref 30–100)
ALBUMIN SERPL-MCNC: 4.2 G/DL (ref 3.6–5.1)
ALBUMIN/CREAT UR: 12 MG/G CREAT
BUN SERPL-MCNC: 48 MG/DL (ref 7–25)
BUN/CREAT SERPL: 22 (CALC) (ref 6–22)
CALCIUM SERPL-MCNC: 9.3 MG/DL (ref 8.6–10.3)
CHLORIDE SERPL-SCNC: 99 MMOL/L (ref 98–110)
CO2 SERPL-SCNC: 25 MMOL/L (ref 20–32)
CREAT SERPL-MCNC: 2.23 MG/DL (ref 0.7–1.22)
CREAT UR-MCNC: 41 MG/DL (ref 20–320)
EGFRCR SERPLBLD CKD-EPI 2021: 28 ML/MIN/1.73M2
ERYTHROCYTE [DISTWIDTH] IN BLOOD BY AUTOMATED COUNT: 14.2 % (ref 11–15)
GLUCOSE SERPL-MCNC: 219 MG/DL (ref 65–139)
HCT VFR BLD AUTO: 45 % (ref 38.5–50)
HGB BLD-MCNC: 14.7 G/DL (ref 13.2–17.1)
MCH RBC QN AUTO: 31.1 PG (ref 27–33)
MCHC RBC AUTO-ENTMCNC: 32.7 G/DL (ref 32–36)
MCV RBC AUTO: 95.1 FL (ref 80–100)
MICROALBUMIN UR-MCNC: 0.5 MG/DL
PHOSPHATE SERPL-MCNC: 3.7 MG/DL (ref 2.1–4.3)
PLATELET # BLD AUTO: 206 THOUSAND/UL (ref 140–400)
PMV BLD REES-ECKER: 10.7 FL (ref 7.5–12.5)
POTASSIUM SERPL-SCNC: 4 MMOL/L (ref 3.5–5.3)
PTH-INTACT SERPL-MCNC: 119 PG/ML (ref 16–77)
RBC # BLD AUTO: 4.73 MILLION/UL (ref 4.2–5.8)
SODIUM SERPL-SCNC: 137 MMOL/L (ref 135–146)
WBC # BLD AUTO: 13.4 THOUSAND/UL (ref 3.8–10.8)

## 2025-06-17 NOTE — PROGRESS NOTES
"Nephrology Outpatient Follow-up Patient Note    Chief Concern:  Follow-up for CKD    History Of Present Illness   Mauro Grande is a 85 y.o. male with a history of CKD, CAD, CABG September 2018, DM2, PAF   - baseline Scr ~1.9-2.2 since at least 2018, on last labs 6/16/25: Scr stable 2.2, eGFR 28, ACR 12. Hb >12, BMD labs OK  Last A1C still very high at 9.4  - on losartan, jardiance, HCTZ  - risk of ESKD at 2y 0.6%, at 5y 1.1% with RASi/SGLT2i use   - he fells \"old\", not dizzy, eating well, losing some wt      Previous Treatment History:  stable non albuminuric CKD for many years, likely from again/DM/HTN, Scr up just a bit after starting SGLT2i, also on RASi and I agree with both. No additional changes for now, SBP acceptable for his age but he needs to improve his DM control, target hbA1c <7, we talk about need to reduce dietary transgressions. At current state, low risk of progression to ESKD     Past Medical History  He has a past medical history of Diabetes mellitus (Multi), Hyperlipemia, and Hypertension.  Problem List[1]    Surgical History  He has a past surgical history that includes MR angio head wo IV contrast (10/23/2016); Tonsillectomy; Nose surgery; and Vasectomy.     Social History  He reports that he has never smoked. He has never used smokeless tobacco. He reports current alcohol use. He reports that he does not use drugs.    Family History  Family History[2]     Allergies  Patient has no known allergies.    Review of Systems  A 12-point review of systems was performed and noted be negative except for that which was mentioned in the history of present illness     Last Recorded Vitals  BP 97/58 (BP Location: Right arm, Patient Position: Sitting, BP Cuff Size: Large adult)   Pulse 90   Temp 36.4 °C (97.5 °F) (Oral)   Ht 1.753 m (5' 9\")   Wt 88.5 kg (195 lb)   SpO2 97%   BMI 28.80 kg/m²      Physical Exam:  Constitutional:  No acute distress  Neck: No JVD   Respiration:  Breathing comfortably, no " stridor  Cardiovascular:  RRR  No edema   Abdomen: Soft, non tender  Neuro:  Alert and oriented times 3, moves 4 extremities. No asterixis  Skin:  skin is without visible rash    Medications:  Medication Documentation Review Audit       Reviewed by Maggie Gutierrez MD (Physician) on 04/17/25 at 1318      Medication Order Taking? Sig Documenting Provider Last Dose Status   atorvastatin (Lipitor) 40 mg tablet [id number] Yes Take 1 tablet (40 mg) by mouth once daily. Smith Aden MD Taking Active   cholecalciferol (Vitamin D-3) 50 MCG (2000 UT) tablet [id number] Yes Take 1 tablet (50 mcg) by mouth once daily. Historical Provider, MD Taking Active   Eliquis 2.5 mg tablet [id number] Yes TAKE 1 TABLET BY MOUTH TWICE  DAILY Smith Aden MD  Active   empagliflozin (Jardiance) 25 mg [id number] Yes Take 1 tablet (25 mg) by mouth once daily. Dispense after 1/1/25 Maggie Gutierrez MD  Active   glipiZIDE XL (Glucotrol XL) 10 mg 24 hr tablet [id number] Yes Take 1 tablet (10 mg) by mouth 2 times a day before meals. Maggie Gutierrez MD  Active   hydroCHLOROthiazide (HYDRODiuril) 25 mg tablet [id number] Yes Take 1 tablet (25 mg) by mouth once daily. Maggie Gutierrez MD  Active   latanoprost (Xalatan) 0.005 % ophthalmic solution [id number] Yes  Historical Provider, MD  Active   losartan (Cozaar) 25 mg tablet [id number] Yes Take 1 tablet (25 mg) by mouth once daily. Historical Provider, MD Taking Active   metFORMIN XR (Glucophage-XR) 500 mg 24 hr tablet [id number] Yes Take 2 tablets (1,000 mg) by mouth once daily with breakfast. Do not crush, chew, or split. Maggie Gutierrez MD  Active   metoprolol succinate XL (Toprol-XL) 25 mg 24 hr tablet [id number] Yes Take 1 tablet (25 mg) by mouth once daily. Maggie Gutierrez MD Taking Active   omeprazole (PriLOSEC) 20 mg DR capsule [id number] Yes Take by mouth once daily in the morning. Take before meals. Historical Provider, MD Taking Active                     Relevant Results Recent labs reviewed in the EMR.  Lab Results   Component Value Date    HGB 14.7 06/16/2025    HGB 13.1 (L) 07/15/2024    HGB 14.1 09/07/2023    HGB 13.0 (L) 08/16/2023    HGB 14.3 07/28/2023    MCV 95.1 06/16/2025    MCV 95 07/15/2024    MCV 97 09/07/2023     (H) 08/16/2023    MCV 97 07/28/2023     06/16/2025     07/15/2024     09/07/2023     08/16/2023     07/28/2023       Lab Results   Component Value Date    IRON 31 (L) 09/15/2018       Lab Results   Component Value Date     06/16/2025    K 4.0 06/16/2025    CL 99 06/16/2025    BUN 48 (H) 06/16/2025    CREATININE 2.23 (H) 06/16/2025       Imaging:  reviewed       Assessment/Plan   1. Stage 3b chronic kidney disease (Multi) (Primary)  - Stable CKD, on RAASi and SGLT2i, ACR low. Wont add any more meds, needs to control his AC1, target <7. SBP at target, lower than before, asymptomatic, is lowering and losing wt might need to decrease hydrochlorothiazide dose   - Follow Up In Nephrology  - CBC; Future  - Renal Function Panel; Future  - Follow Up In Nephrology; Future    2. Essential hypertension  - controlled  - Follow Up In Nephrology     - RTO 6 months, labs before   Bruce Kline MD  Nephrology and Hypertension         [1]   Patient Active Problem List  Diagnosis    Syncope    Sleep trouble    S/P CABG x 2    Primary localized osteoarthrosis of right lower leg    Palpitations    Hypertension    HLD (hyperlipidemia)    GERD (gastroesophageal reflux disease)    New onset a-fib (Multi)    Anemia    Accelerating angina (Multi)    Abnormal stress electrocardiogram test    CAD in native artery    Diabetes (Multi)    Ureteral stone with hydronephrosis    Wrist pain    Wheezing    UTI (urinary tract infection)    Tendinitis of right shoulder    Prominent ileocecal valve    Pain in right knee    Onychomycosis    History of colonic polyps    Constipation    Distal radius fracture, left     Diverticulosis of large intestine without hemorrhage    Elevated white blood cell count    Stage 3b chronic kidney disease (Multi)    Abdominal pain    Benign prostatic hyperplasia with urinary obstruction    Closed fracture of ankle    Fall    Postoperative pain    Pyelonephritis   [2]   Family History  Problem Relation Name Age of Onset    Cancer Mother      Other (Heart problems) Father      Other (Heart problems) Mother's Sister      Lung cancer Father's Brother      Alzheimer's disease Father's Brother      Other (Heart problems) Maternal Grandfather

## 2025-06-18 ENCOUNTER — APPOINTMENT (OUTPATIENT)
Dept: NEPHROLOGY | Facility: CLINIC | Age: 85
End: 2025-06-18
Payer: MEDICARE

## 2025-06-18 VITALS
TEMPERATURE: 97.5 F | HEIGHT: 69 IN | HEART RATE: 90 BPM | WEIGHT: 195 LBS | SYSTOLIC BLOOD PRESSURE: 97 MMHG | OXYGEN SATURATION: 97 % | DIASTOLIC BLOOD PRESSURE: 58 MMHG | BODY MASS INDEX: 28.88 KG/M2

## 2025-06-18 DIAGNOSIS — I10 ESSENTIAL HYPERTENSION: ICD-10-CM

## 2025-06-18 DIAGNOSIS — N18.32 STAGE 3B CHRONIC KIDNEY DISEASE (MULTI): Primary | ICD-10-CM

## 2025-06-18 PROCEDURE — 1159F MED LIST DOCD IN RCRD: CPT | Performed by: INTERNAL MEDICINE

## 2025-06-18 PROCEDURE — 3078F DIAST BP <80 MM HG: CPT | Performed by: INTERNAL MEDICINE

## 2025-06-18 PROCEDURE — 3074F SYST BP LT 130 MM HG: CPT | Performed by: INTERNAL MEDICINE

## 2025-06-18 PROCEDURE — 99214 OFFICE O/P EST MOD 30 MIN: CPT | Performed by: INTERNAL MEDICINE

## 2025-06-18 NOTE — PROGRESS NOTES
"  Clinical Pharmacy Appointment    Patient ID: Mauro Grande is a 85 y.o. male who presents for Diabetes.    Pt is here for Follow Up appointment.     Referring Provider: Maggie Gutierrez,*  PCP: Maggie Gutierrez MD   Last visit with PCP: 4/17/25   Next visit with PCP: 7/21/25    Subjective     HPI  DIABETES MELLITUS TYPE 2:    Known diabetic complications: CKD.  Does patient follow with Endocrinology: No  Last optometry exam: 2/5/25 with Shante Jones at [facility]  Most recent visit in Podiatry: 5/16/25 -- patient reports a sore on his foot today that has improved greatly over the past 2 months.     Current diabetic medications include:  Metformin XR 1000 mg daily  Glipizide 10 mg XL mg one tablet before breakfast and dinner  Jardiance 25 mg daily    Clarifications to above regimen: None  Adverse Effects: None    Glucose Readings:  Patient tests BG 1 time per day    Current home BG readings:   130-140 lately, but \"pigged out last night\" and sugar was elevated at 195 this morning    Previous home BG readings:  Date Fasting BG   5/22 127   5/21 108   5/20 125   5/19 128   5/18 134   5/17 122   5/16 132   5/15 137   5/14 125   5/13 127     Any episodes of hypoglycemia? Yes, readings of 69 on 4/22 and 4/23.    Did patient treat episode of hypoglycemia appropriately? Yes  Does the patient have a prescription for ready-to-use Glucagon? Not on insulin  Does pt have proteinuria? No    Lifestyle:  Tries to keep low-carb. Has cut out pasta and cake  Snacks: candy  Drinks: Coke, cocktail occasionally  Physical Activity: 20 mins daily, mostly leg exercises    Secondary Prevention:  Statin? Yes  ACE-I/ARB? Yes  Aspirin? No    Pertinent PMH Review:  PMH of Pancreatitis: No  PMH of Retinopathy: No  PMH of Urinary Tract Infections: No  PMH of MTC: No    Immunizations:  Influenza? Yes  COVID? Yes  Pneumonia? No  Shingles? No  Hepatitis B? No     Drug Interactions  No relevant drug interactions were " noted.    Medication System Management  Patient's preferred pharmacy: [facility]  Adherence/Organization: N/A  Affordability/Accessibility: Not eligible for [facility] due to income    Objective   No Known Allergies  Social History     Social History Narrative    Lives in an apt with his wife     Retired at age 67 and worked part time until 83           Medication Review  Current Outpatient Medications   Medication Instructions    atorvastatin (LIPITOR) 40 mg, oral, Daily    cholecalciferol (Vitamin D-3) 50 MCG (2000 UT) tablet 1 tablet, Daily    Eliquis 2.5 mg, oral, 2 times daily    empagliflozin (JARDIANCE) 25 mg, oral, Daily, Dispense after 1/1/25    glipiZIDE XL (GLUCOTROL XL) 10 mg, oral, 2 times daily before meals    hydroCHLOROthiazide (HYDRODIURIL) 25 mg, oral, Daily    latanoprost (Xalatan) 0.005 % ophthalmic solution     losartan (COZAAR) 25 mg, Daily    metFORMIN XR (GLUCOPHAGE-XR) 1,000 mg, oral, Daily with breakfast, Do not crush, chew, or split.    metoprolol succinate XL (TOPROL-XL) 25 mg, oral, Daily    omeprazole (PriLOSEC) 20 mg DR capsule Daily before breakfast      Vitals  BP Readings from Last 2 Encounters:   06/18/25 97/58   04/17/25 114/66     BMI Readings from Last 1 Encounters:   06/18/25 28.80 kg/m²      Labs  A1C  Lab Results   Component Value Date    HGBA1C 9.4 (H) 04/07/2025    HGBA1C 8.4 (H) 12/09/2024    HGBA1C 9.1 (H) 07/15/2024     BMP  Lab Results   Component Value Date    CALCIUM 9.3 06/16/2025     06/16/2025    K 4.0 06/16/2025    CO2 25 06/16/2025    CL 99 06/16/2025    BUN 48 (H) 06/16/2025    CREATININE 2.23 (H) 06/16/2025    EGFR 28 (L) 06/16/2025     LFTs  Lab Results   Component Value Date    ALT 24 09/07/2023    AST 24 09/07/2023    ALKPHOS 85 09/07/2023    BILITOT 1.0 09/07/2023     FLP  Lab Results   Component Value Date    TRIG 178 (H) 07/15/2024    CHOL 140 07/15/2024    LDLF 95 07/28/2023    LDLCALC 63 07/15/2024    HDL 41.6 07/15/2024     Urine  Microalbumin  Lab Results   Component Value Date    MICROALBCREA 12 06/16/2025     Weight Management  Wt Readings from Last 3 Encounters:   06/18/25 88.5 kg (195 lb)   04/17/25 92.7 kg (204 lb 6.4 oz)   04/07/25 94.1 kg (207 lb 8 oz)      There is no height or weight on file to calculate BMI.     Assessment/Plan   Problem List Items Addressed This Visit       Diabetes (Multi)    Patient's goal A1c is < 7%.  Is pt at goal? No, A1C worsened to 9.4%.  Patient has been more lax lately and his SMBGs are slightly above goal. He is indulging in late night snacks occasionally, but he has cut back on cookies, chips, and ice cream. Encouraged patient to keep up the good work and will follow up after PCP appointment with updated A1C.    Medication Changes:  CONTINUE  Metformin  mg twice daily  Jardiance 25 mg daily  Glipizide XL 10 mg before breakfast and dinner         Relevant Orders    Referral to Clinical Pharmacy     Clinical Pharmacist follow-up: 8/1/25, Telehealth visit    Continue all meds under the continuation of care with the referring provider and clinical pharmacy team.    Thank you,  Veena Santacruz, PharmD  Clinical Pharmacist  [phone]    Verbal consent to manage patient's drug therapy was obtained from the patient. They were informed they may decline to participate or withdraw from participation in pharmacy services at any time.

## 2025-06-20 ENCOUNTER — APPOINTMENT (OUTPATIENT)
Dept: PHARMACY | Facility: HOSPITAL | Age: 85
End: 2025-06-20
Payer: MEDICARE

## 2025-06-20 DIAGNOSIS — E11.69 TYPE 2 DIABETES MELLITUS WITH OTHER SPECIFIED COMPLICATION, WITHOUT LONG-TERM CURRENT USE OF INSULIN: ICD-10-CM

## 2025-06-20 NOTE — ASSESSMENT & PLAN NOTE
Patient's goal A1c is < 7%.  Is pt at goal? No, A1C worsened to 9.4%.  Patient has been more lax lately and his SMBGs are slightly above goal. He is indulging in late night snacks occasionally, but he has cut back on cookies, chips, and ice cream. Encouraged patient to keep up the good work and will follow up after PCP appointment with updated A1C.    Medication Changes:  CONTINUE  Metformin  mg twice daily  Jardiance 25 mg daily  Glipizide XL 10 mg before breakfast and dinner

## 2025-07-01 DIAGNOSIS — E11.69 TYPE 2 DIABETES MELLITUS WITH OTHER SPECIFIED COMPLICATION, WITHOUT LONG-TERM CURRENT USE OF INSULIN: ICD-10-CM

## 2025-07-14 DIAGNOSIS — I10 PRIMARY HYPERTENSION: ICD-10-CM

## 2025-07-14 RX ORDER — HYDROCHLOROTHIAZIDE 25 MG/1
25 TABLET ORAL DAILY
Qty: 90 TABLET | Refills: 0 | Status: ON HOLD | OUTPATIENT
Start: 2025-07-14

## 2025-07-18 ENCOUNTER — APPOINTMENT (OUTPATIENT)
Dept: CARDIOLOGY | Facility: HOSPITAL | Age: 85
DRG: 872 | End: 2025-07-18
Payer: MEDICARE

## 2025-07-18 ENCOUNTER — APPOINTMENT (OUTPATIENT)
Dept: RADIOLOGY | Facility: HOSPITAL | Age: 85
DRG: 872 | End: 2025-07-18
Payer: MEDICARE

## 2025-07-18 ENCOUNTER — HOSPITAL ENCOUNTER (INPATIENT)
Facility: HOSPITAL | Age: 85
End: 2025-07-18
Attending: GENERAL PRACTICE | Admitting: HOSPITALIST
Payer: MEDICARE

## 2025-07-18 DIAGNOSIS — E11.9 TYPE 2 DIABETES MELLITUS WITHOUT COMPLICATION, WITHOUT LONG-TERM CURRENT USE OF INSULIN: ICD-10-CM

## 2025-07-18 DIAGNOSIS — N39.0 URINARY TRACT INFECTION WITHOUT HEMATURIA, SITE UNSPECIFIED: ICD-10-CM

## 2025-07-18 DIAGNOSIS — R06.00 DYSPNEA, UNSPECIFIED TYPE: ICD-10-CM

## 2025-07-18 DIAGNOSIS — Z95.1 S/P CABG X 2: ICD-10-CM

## 2025-07-18 DIAGNOSIS — A41.9 SEPSIS, DUE TO UNSPECIFIED ORGANISM, UNSPECIFIED WHETHER ACUTE ORGAN DYSFUNCTION PRESENT (MULTI): Primary | ICD-10-CM

## 2025-07-18 DIAGNOSIS — I25.10 CAD IN NATIVE ARTERY: ICD-10-CM

## 2025-07-18 LAB
ALBUMIN SERPL BCP-MCNC: 3.5 G/DL (ref 3.4–5)
ALBUMIN SERPL BCP-MCNC: 4 G/DL (ref 3.4–5)
ALP SERPL-CCNC: 62 U/L (ref 33–136)
ALP SERPL-CCNC: 75 U/L (ref 33–136)
ALT SERPL W P-5'-P-CCNC: 12 U/L (ref 10–52)
ALT SERPL W P-5'-P-CCNC: 13 U/L (ref 10–52)
ANION GAP SERPL CALC-SCNC: 14 MMOL/L (ref 10–20)
ANION GAP SERPL CALC-SCNC: 19 MMOL/L (ref 10–20)
AORTIC VALVE MEAN GRADIENT: 3 MMHG
AORTIC VALVE PEAK VELOCITY: 1.33 M/S
APPEARANCE UR: CLEAR
AST SERPL W P-5'-P-CCNC: 19 U/L (ref 9–39)
AST SERPL W P-5'-P-CCNC: 19 U/L (ref 9–39)
AV PEAK GRADIENT: 7 MMHG
AVA (PEAK VEL): 2.27 CM2
AVA (VTI): 2.92 CM2
BACTERIA #/AREA URNS AUTO: ABNORMAL /HPF
BASOPHILS # BLD AUTO: 0.07 X10*3/UL (ref 0–0.1)
BASOPHILS # BLD AUTO: 0.08 X10*3/UL (ref 0–0.1)
BASOPHILS NFR BLD AUTO: 0.3 %
BASOPHILS NFR BLD AUTO: 0.4 %
BILIRUB DIRECT SERPL-MCNC: 0.2 MG/DL (ref 0–0.3)
BILIRUB SERPL-MCNC: 1.3 MG/DL (ref 0–1.2)
BILIRUB SERPL-MCNC: 1.3 MG/DL (ref 0–1.2)
BILIRUB UR STRIP.AUTO-MCNC: NEGATIVE MG/DL
BNP SERPL-MCNC: 398 PG/ML (ref 0–99)
BUN SERPL-MCNC: 30 MG/DL (ref 6–23)
BUN SERPL-MCNC: 32 MG/DL (ref 6–23)
CALCIUM SERPL-MCNC: 8.9 MG/DL (ref 8.6–10.3)
CALCIUM SERPL-MCNC: 9.3 MG/DL (ref 8.6–10.3)
CHLORIDE SERPL-SCNC: 100 MMOL/L (ref 98–107)
CHLORIDE SERPL-SCNC: 102 MMOL/L (ref 98–107)
CO2 SERPL-SCNC: 21 MMOL/L (ref 21–32)
CO2 SERPL-SCNC: 25 MMOL/L (ref 21–32)
COLOR UR: ABNORMAL
CREAT SERPL-MCNC: 1.88 MG/DL (ref 0.5–1.3)
CREAT SERPL-MCNC: 2.16 MG/DL (ref 0.5–1.3)
EGFRCR SERPLBLD CKD-EPI 2021: 29 ML/MIN/1.73M*2
EGFRCR SERPLBLD CKD-EPI 2021: 35 ML/MIN/1.73M*2
EJECTION FRACTION APICAL 4 CHAMBER: 63.3
EJECTION FRACTION: 61 %
EOSINOPHIL # BLD AUTO: 0.01 X10*3/UL (ref 0–0.4)
EOSINOPHIL # BLD AUTO: 0.04 X10*3/UL (ref 0–0.4)
EOSINOPHIL NFR BLD AUTO: 0 %
EOSINOPHIL NFR BLD AUTO: 0.2 %
ERYTHROCYTE [DISTWIDTH] IN BLOOD BY AUTOMATED COUNT: 13.7 % (ref 11.5–14.5)
ERYTHROCYTE [DISTWIDTH] IN BLOOD BY AUTOMATED COUNT: 13.8 % (ref 11.5–14.5)
EST. AVERAGE GLUCOSE BLD GHB EST-MCNC: 200 MG/DL
GLUCOSE BLD MANUAL STRIP-MCNC: 137 MG/DL (ref 74–99)
GLUCOSE BLD MANUAL STRIP-MCNC: 148 MG/DL (ref 74–99)
GLUCOSE BLD MANUAL STRIP-MCNC: 156 MG/DL (ref 74–99)
GLUCOSE SERPL-MCNC: 157 MG/DL (ref 74–99)
GLUCOSE SERPL-MCNC: 238 MG/DL (ref 74–99)
GLUCOSE UR STRIP.AUTO-MCNC: ABNORMAL MG/DL
HBA1C MFR BLD: 8.6 % (ref ?–5.7)
HCT VFR BLD AUTO: 38.3 % (ref 41–52)
HCT VFR BLD AUTO: 42.8 % (ref 41–52)
HGB BLD-MCNC: 12.7 G/DL (ref 13.5–17.5)
HGB BLD-MCNC: 14.4 G/DL (ref 13.5–17.5)
IMM GRANULOCYTES # BLD AUTO: 0.14 X10*3/UL (ref 0–0.5)
IMM GRANULOCYTES # BLD AUTO: 0.15 X10*3/UL (ref 0–0.5)
IMM GRANULOCYTES NFR BLD AUTO: 0.6 % (ref 0–0.9)
IMM GRANULOCYTES NFR BLD AUTO: 0.7 % (ref 0–0.9)
KETONES UR STRIP.AUTO-MCNC: NEGATIVE MG/DL
LACTATE SERPL-SCNC: 2 MMOL/L (ref 0.4–2)
LACTATE SERPL-SCNC: 2.6 MMOL/L (ref 0.4–2)
LACTATE SERPL-SCNC: 2.7 MMOL/L (ref 0.4–2)
LACTATE SERPL-SCNC: 3.1 MMOL/L (ref 0.4–2)
LEFT ATRIUM VOLUME AREA LENGTH INDEX BSA: 20.3 ML/M2
LEFT VENTRICLE INTERNAL DIMENSION DIASTOLE: 4.43 CM (ref 3.5–6)
LEFT VENTRICULAR OUTFLOW TRACT DIAMETER: 1.97 CM
LEUKOCYTE ESTERASE UR QL STRIP.AUTO: ABNORMAL
LYMPHOCYTES # BLD AUTO: 5.56 X10*3/UL (ref 0.8–3)
LYMPHOCYTES # BLD AUTO: 5.79 X10*3/UL (ref 0.8–3)
LYMPHOCYTES NFR BLD AUTO: 24.7 %
LYMPHOCYTES NFR BLD AUTO: 25.7 %
MCH RBC QN AUTO: 30.1 PG (ref 26–34)
MCH RBC QN AUTO: 30.6 PG (ref 26–34)
MCHC RBC AUTO-ENTMCNC: 33.2 G/DL (ref 32–36)
MCHC RBC AUTO-ENTMCNC: 33.6 G/DL (ref 32–36)
MCV RBC AUTO: 91 FL (ref 80–100)
MCV RBC AUTO: 91 FL (ref 80–100)
MONOCYTES # BLD AUTO: 1.54 X10*3/UL (ref 0.05–0.8)
MONOCYTES # BLD AUTO: 1.83 X10*3/UL (ref 0.05–0.8)
MONOCYTES NFR BLD AUTO: 6.8 %
MONOCYTES NFR BLD AUTO: 8.1 %
NEUTROPHILS # BLD AUTO: 14.89 X10*3/UL (ref 1.6–5.5)
NEUTROPHILS # BLD AUTO: 14.96 X10*3/UL (ref 1.6–5.5)
NEUTROPHILS NFR BLD AUTO: 66 %
NEUTROPHILS NFR BLD AUTO: 66.5 %
NITRITE UR QL STRIP.AUTO: ABNORMAL
NRBC BLD-RTO: 0 /100 WBCS (ref 0–0)
NRBC BLD-RTO: 0 /100 WBCS (ref 0–0)
PH UR STRIP.AUTO: 5 [PH]
PLATELET # BLD AUTO: 158 X10*3/UL (ref 150–450)
PLATELET # BLD AUTO: 183 X10*3/UL (ref 150–450)
POTASSIUM SERPL-SCNC: 3.3 MMOL/L (ref 3.5–5.3)
POTASSIUM SERPL-SCNC: 3.7 MMOL/L (ref 3.5–5.3)
PROT SERPL-MCNC: 6.4 G/DL (ref 6.4–8.2)
PROT SERPL-MCNC: 7.6 G/DL (ref 6.4–8.2)
PROT UR STRIP.AUTO-MCNC: NEGATIVE MG/DL
RBC # BLD AUTO: 4.22 X10*6/UL (ref 4.5–5.9)
RBC # BLD AUTO: 4.7 X10*6/UL (ref 4.5–5.9)
RBC # UR STRIP.AUTO: ABNORMAL MG/DL
RBC #/AREA URNS AUTO: ABNORMAL /HPF
RIGHT VENTRICLE FREE WALL PEAK S': 9.03 CM/S
RIGHT VENTRICLE PEAK SYSTOLIC PRESSURE: 31 MMHG
SODIUM SERPL-SCNC: 136 MMOL/L (ref 136–145)
SODIUM SERPL-SCNC: 138 MMOL/L (ref 136–145)
SP GR UR STRIP.AUTO: 1.01
TRICUSPID ANNULAR PLANE SYSTOLIC EXCURSION: 0.3 CM
UROBILINOGEN UR STRIP.AUTO-MCNC: NORMAL MG/DL
WBC # BLD AUTO: 22.5 X10*3/UL (ref 4.4–11.3)
WBC # BLD AUTO: 22.5 X10*3/UL (ref 4.4–11.3)
WBC #/AREA URNS AUTO: ABNORMAL /HPF

## 2025-07-18 PROCEDURE — 2500000001 HC RX 250 WO HCPCS SELF ADMINISTERED DRUGS (ALT 637 FOR MEDICARE OP): Performed by: NURSE PRACTITIONER

## 2025-07-18 PROCEDURE — 96361 HYDRATE IV INFUSION ADD-ON: CPT

## 2025-07-18 PROCEDURE — 80053 COMPREHEN METABOLIC PANEL: CPT | Performed by: GENERAL PRACTICE

## 2025-07-18 PROCEDURE — 80053 COMPREHEN METABOLIC PANEL: CPT | Performed by: NURSE PRACTITIONER

## 2025-07-18 PROCEDURE — 2500000005 HC RX 250 GENERAL PHARMACY W/O HCPCS: Performed by: PHARMACIST

## 2025-07-18 PROCEDURE — 2500000004 HC RX 250 GENERAL PHARMACY W/ HCPCS (ALT 636 FOR OP/ED): Performed by: EMERGENCY MEDICINE

## 2025-07-18 PROCEDURE — 71045 X-RAY EXAM CHEST 1 VIEW: CPT | Performed by: RADIOLOGY

## 2025-07-18 PROCEDURE — 2500000002 HC RX 250 W HCPCS SELF ADMINISTERED DRUGS (ALT 637 FOR MEDICARE OP, ALT 636 FOR OP/ED): Performed by: NURSE PRACTITIONER

## 2025-07-18 PROCEDURE — 87086 URINE CULTURE/COLONY COUNT: CPT | Mod: AHULAB | Performed by: GENERAL PRACTICE

## 2025-07-18 PROCEDURE — 85025 COMPLETE CBC W/AUTO DIFF WBC: CPT | Performed by: NURSE PRACTITIONER

## 2025-07-18 PROCEDURE — 2500000004 HC RX 250 GENERAL PHARMACY W/ HCPCS (ALT 636 FOR OP/ED): Performed by: NURSE PRACTITIONER

## 2025-07-18 PROCEDURE — 2500000004 HC RX 250 GENERAL PHARMACY W/ HCPCS (ALT 636 FOR OP/ED): Performed by: PHARMACIST

## 2025-07-18 PROCEDURE — 96360 HYDRATION IV INFUSION INIT: CPT

## 2025-07-18 PROCEDURE — 82248 BILIRUBIN DIRECT: CPT | Performed by: NURSE PRACTITIONER

## 2025-07-18 PROCEDURE — 83605 ASSAY OF LACTIC ACID: CPT | Performed by: GENERAL PRACTICE

## 2025-07-18 PROCEDURE — 99222 1ST HOSP IP/OBS MODERATE 55: CPT | Performed by: NURSE PRACTITIONER

## 2025-07-18 PROCEDURE — 96366 THER/PROPH/DIAG IV INF ADDON: CPT

## 2025-07-18 PROCEDURE — 36415 COLL VENOUS BLD VENIPUNCTURE: CPT | Performed by: GENERAL PRACTICE

## 2025-07-18 PROCEDURE — 2500000004 HC RX 250 GENERAL PHARMACY W/ HCPCS (ALT 636 FOR OP/ED): Performed by: HOSPITALIST

## 2025-07-18 PROCEDURE — 85025 COMPLETE CBC W/AUTO DIFF WBC: CPT | Performed by: GENERAL PRACTICE

## 2025-07-18 PROCEDURE — 83036 HEMOGLOBIN GLYCOSYLATED A1C: CPT | Mod: AHULAB | Performed by: NURSE PRACTITIONER

## 2025-07-18 PROCEDURE — 83605 ASSAY OF LACTIC ACID: CPT | Performed by: NURSE PRACTITIONER

## 2025-07-18 PROCEDURE — 83880 ASSAY OF NATRIURETIC PEPTIDE: CPT | Performed by: NURSE PRACTITIONER

## 2025-07-18 PROCEDURE — 96368 THER/DIAG CONCURRENT INF: CPT

## 2025-07-18 PROCEDURE — 96365 THER/PROPH/DIAG IV INF INIT: CPT | Mod: 59

## 2025-07-18 PROCEDURE — 81003 URINALYSIS AUTO W/O SCOPE: CPT | Performed by: GENERAL PRACTICE

## 2025-07-18 PROCEDURE — 93306 TTE W/DOPPLER COMPLETE: CPT

## 2025-07-18 PROCEDURE — 82947 ASSAY GLUCOSE BLOOD QUANT: CPT

## 2025-07-18 PROCEDURE — 99291 CRITICAL CARE FIRST HOUR: CPT | Performed by: GENERAL PRACTICE

## 2025-07-18 PROCEDURE — 2500000001 HC RX 250 WO HCPCS SELF ADMINISTERED DRUGS (ALT 637 FOR MEDICARE OP): Performed by: HOSPITALIST

## 2025-07-18 PROCEDURE — 70450 CT HEAD/BRAIN W/O DYE: CPT | Performed by: RADIOLOGY

## 2025-07-18 PROCEDURE — 99285 EMERGENCY DEPT VISIT HI MDM: CPT | Mod: 25 | Performed by: GENERAL PRACTICE

## 2025-07-18 PROCEDURE — 87077 CULTURE AEROBIC IDENTIFY: CPT | Mod: AHULAB | Performed by: GENERAL PRACTICE

## 2025-07-18 PROCEDURE — 2500000005 HC RX 250 GENERAL PHARMACY W/O HCPCS: Performed by: HOSPITALIST

## 2025-07-18 PROCEDURE — 71045 X-RAY EXAM CHEST 1 VIEW: CPT

## 2025-07-18 PROCEDURE — 1200000002 HC GENERAL ROOM WITH TELEMETRY DAILY

## 2025-07-18 PROCEDURE — 87040 BLOOD CULTURE FOR BACTERIA: CPT | Mod: AHULAB | Performed by: GENERAL PRACTICE

## 2025-07-18 PROCEDURE — 93306 TTE W/DOPPLER COMPLETE: CPT | Performed by: STUDENT IN AN ORGANIZED HEALTH CARE EDUCATION/TRAINING PROGRAM

## 2025-07-18 PROCEDURE — 70450 CT HEAD/BRAIN W/O DYE: CPT

## 2025-07-18 RX ORDER — INSULIN LISPRO 100 [IU]/ML
0-10 INJECTION, SOLUTION INTRAVENOUS; SUBCUTANEOUS
Status: DISPENSED | OUTPATIENT
Start: 2025-07-18

## 2025-07-18 RX ORDER — TALC
3 POWDER (GRAM) TOPICAL NIGHTLY PRN
Status: DISPENSED | OUTPATIENT
Start: 2025-07-18

## 2025-07-18 RX ORDER — SENNOSIDES 8.6 MG/1
2 TABLET ORAL 2 TIMES DAILY
Status: DISPENSED | OUTPATIENT
Start: 2025-07-18

## 2025-07-18 RX ORDER — GLIPIZIDE 2.5 MG/1
5 TABLET, EXTENDED RELEASE ORAL
Status: DISPENSED | OUTPATIENT
Start: 2025-07-18

## 2025-07-18 RX ORDER — VANCOMYCIN HYDROCHLORIDE 1 G/20ML
INJECTION, POWDER, LYOPHILIZED, FOR SOLUTION INTRAVENOUS DAILY PRN
Status: DISCONTINUED | OUTPATIENT
Start: 2025-07-18 | End: 2025-07-18

## 2025-07-18 RX ORDER — ACETAMINOPHEN 325 MG/1
650 TABLET ORAL EVERY 4 HOURS PRN
Status: DISPENSED | OUTPATIENT
Start: 2025-07-18

## 2025-07-18 RX ORDER — ACETAMINOPHEN 325 MG/1
650 TABLET ORAL EVERY 4 HOURS PRN
Status: DISCONTINUED | OUTPATIENT
Start: 2025-07-18 | End: 2025-07-19

## 2025-07-18 RX ORDER — CHOLECALCIFEROL (VITAMIN D3) 25 MCG
50 TABLET ORAL DAILY
Status: DISPENSED | OUTPATIENT
Start: 2025-07-18

## 2025-07-18 RX ORDER — POTASSIUM CHLORIDE 20 MEQ/1
20 TABLET, EXTENDED RELEASE ORAL ONCE
Status: COMPLETED | OUTPATIENT
Start: 2025-07-18 | End: 2025-07-18

## 2025-07-18 RX ORDER — METOPROLOL SUCCINATE 25 MG/1
25 TABLET, EXTENDED RELEASE ORAL DAILY
Status: DISPENSED | OUTPATIENT
Start: 2025-07-18

## 2025-07-18 RX ORDER — LATANOPROST 50 UG/ML
1 SOLUTION/ DROPS OPHTHALMIC NIGHTLY
Status: DISPENSED | OUTPATIENT
Start: 2025-07-18

## 2025-07-18 RX ORDER — LOSARTAN POTASSIUM 25 MG/1
12.5 TABLET ORAL DAILY
Status: DISPENSED | OUTPATIENT
Start: 2025-07-18

## 2025-07-18 RX ORDER — CEFTRIAXONE 1 G/50ML
1 INJECTION, SOLUTION INTRAVENOUS EVERY 24 HOURS
Status: DISCONTINUED | OUTPATIENT
Start: 2025-07-18 | End: 2025-07-18

## 2025-07-18 RX ORDER — SODIUM CHLORIDE, SODIUM LACTATE, POTASSIUM CHLORIDE, CALCIUM CHLORIDE 600; 310; 30; 20 MG/100ML; MG/100ML; MG/100ML; MG/100ML
130 INJECTION, SOLUTION INTRAVENOUS CONTINUOUS
Status: DISCONTINUED | OUTPATIENT
Start: 2025-07-18 | End: 2025-07-18

## 2025-07-18 RX ORDER — HYDROCHLOROTHIAZIDE 25 MG/1
25 TABLET ORAL DAILY
Status: DISPENSED | OUTPATIENT
Start: 2025-07-18

## 2025-07-18 RX ORDER — ONDANSETRON HYDROCHLORIDE 2 MG/ML
4 INJECTION, SOLUTION INTRAVENOUS EVERY 8 HOURS PRN
Status: ACTIVE | OUTPATIENT
Start: 2025-07-18

## 2025-07-18 RX ORDER — ATORVASTATIN CALCIUM 40 MG/1
40 TABLET, FILM COATED ORAL NIGHTLY
Status: DISPENSED | OUTPATIENT
Start: 2025-07-18

## 2025-07-18 RX ORDER — ONDANSETRON 4 MG/1
4 TABLET, ORALLY DISINTEGRATING ORAL EVERY 8 HOURS PRN
Status: ACTIVE | OUTPATIENT
Start: 2025-07-18

## 2025-07-18 RX ORDER — PANTOPRAZOLE SODIUM 40 MG/1
40 TABLET, DELAYED RELEASE ORAL
Status: DISPENSED | OUTPATIENT
Start: 2025-07-18

## 2025-07-18 RX ORDER — LOSARTAN POTASSIUM 50 MG/1
25 TABLET ORAL DAILY
Status: DISCONTINUED | OUTPATIENT
Start: 2025-07-18 | End: 2025-07-18

## 2025-07-18 RX ORDER — SODIUM CHLORIDE 9 MG/ML
75 INJECTION, SOLUTION INTRAVENOUS CONTINUOUS
Status: DISCONTINUED | OUTPATIENT
Start: 2025-07-18 | End: 2025-07-18

## 2025-07-18 RX ADMIN — LOSARTAN POTASSIUM 12.5 MG: 25 TABLET, FILM COATED ORAL at 09:50

## 2025-07-18 RX ADMIN — PIPERACILLIN SODIUM AND TAZOBACTAM SODIUM 2.25 G: 2; .25 INJECTION, SOLUTION INTRAVENOUS at 12:14

## 2025-07-18 RX ADMIN — PIPERACILLIN SODIUM AND TAZOBACTAM SODIUM 2.25 G: 2; .25 INJECTION, SOLUTION INTRAVENOUS at 22:01

## 2025-07-18 RX ADMIN — CEFTRIAXONE 1 G: 1 INJECTION, SOLUTION INTRAVENOUS at 09:52

## 2025-07-18 RX ADMIN — PIPERACILLIN SODIUM AND TAZOBACTAM SODIUM 3.38 G: 3; .375 INJECTION, SOLUTION INTRAVENOUS at 02:02

## 2025-07-18 RX ADMIN — POTASSIUM CHLORIDE EXTENDED-RELEASE 20 MEQ: 1500 TABLET ORAL at 16:27

## 2025-07-18 RX ADMIN — APIXABAN 2.5 MG: 2.5 TABLET, FILM COATED ORAL at 20:29

## 2025-07-18 RX ADMIN — ATORVASTATIN CALCIUM 40 MG: 40 TABLET, FILM COATED ORAL at 20:29

## 2025-07-18 RX ADMIN — APIXABAN 2.5 MG: 2.5 TABLET, FILM COATED ORAL at 09:50

## 2025-07-18 RX ADMIN — SODIUM CHLORIDE, SODIUM LACTATE, POTASSIUM CHLORIDE, AND CALCIUM CHLORIDE 1000 ML: .6; .31; .03; .02 INJECTION, SOLUTION INTRAVENOUS at 06:19

## 2025-07-18 RX ADMIN — PIPERACILLIN SODIUM AND TAZOBACTAM SODIUM 2.25 G: 2; .25 INJECTION, SOLUTION INTRAVENOUS at 16:28

## 2025-07-18 RX ADMIN — HYDROCHLOROTHIAZIDE 25 MG: 25 TABLET ORAL at 16:29

## 2025-07-18 RX ADMIN — Medication 50 MCG: at 09:51

## 2025-07-18 RX ADMIN — PANTOPRAZOLE SODIUM 40 MG: 40 TABLET, DELAYED RELEASE ORAL at 09:51

## 2025-07-18 RX ADMIN — VANCOMYCIN HYDROCHLORIDE 1500 MG: 5 INJECTION, POWDER, LYOPHILIZED, FOR SOLUTION INTRAVENOUS at 02:02

## 2025-07-18 RX ADMIN — Medication 3 MG: at 20:30

## 2025-07-18 RX ADMIN — SODIUM CHLORIDE, SODIUM LACTATE, POTASSIUM CHLORIDE, AND CALCIUM CHLORIDE 130 ML/HR: .6; .31; .03; .02 INJECTION, SOLUTION INTRAVENOUS at 06:19

## 2025-07-18 RX ADMIN — GLIPIZIDE 5 MG: 2.5 TABLET, EXTENDED RELEASE ORAL at 16:28

## 2025-07-18 RX ADMIN — INSULIN LISPRO 2 UNITS: 100 INJECTION, SOLUTION INTRAVENOUS; SUBCUTANEOUS at 17:55

## 2025-07-18 RX ADMIN — LATANOPROST 1 DROP: 50 SOLUTION/ DROPS OPHTHALMIC at 20:29

## 2025-07-18 RX ADMIN — SODIUM CHLORIDE, SODIUM LACTATE, POTASSIUM CHLORIDE, AND CALCIUM CHLORIDE 2500 ML: .6; .31; .03; .02 INJECTION, SOLUTION INTRAVENOUS at 02:50

## 2025-07-18 RX ADMIN — METOPROLOL SUCCINATE 25 MG: 25 TABLET, EXTENDED RELEASE ORAL at 09:51

## 2025-07-18 SDOH — SOCIAL STABILITY: SOCIAL INSECURITY: WITHIN THE LAST YEAR, HAVE YOU BEEN HUMILIATED OR EMOTIONALLY ABUSED IN OTHER WAYS BY YOUR PARTNER OR EX-PARTNER?: NO

## 2025-07-18 SDOH — ECONOMIC STABILITY: INCOME INSECURITY: IN THE PAST 12 MONTHS HAS THE ELECTRIC, GAS, OIL, OR WATER COMPANY THREATENED TO SHUT OFF SERVICES IN YOUR HOME?: NO

## 2025-07-18 SDOH — ECONOMIC STABILITY: FOOD INSECURITY: WITHIN THE PAST 12 MONTHS, THE FOOD YOU BOUGHT JUST DIDN'T LAST AND YOU DIDN'T HAVE MONEY TO GET MORE.: NEVER TRUE

## 2025-07-18 SDOH — SOCIAL STABILITY: SOCIAL INSECURITY: WITHIN THE LAST YEAR, HAVE YOU BEEN AFRAID OF YOUR PARTNER OR EX-PARTNER?: NO

## 2025-07-18 SDOH — SOCIAL STABILITY: SOCIAL INSECURITY: HAVE YOU HAD THOUGHTS OF HARMING ANYONE ELSE?: NO

## 2025-07-18 SDOH — ECONOMIC STABILITY: FOOD INSECURITY: WITHIN THE PAST 12 MONTHS, YOU WORRIED THAT YOUR FOOD WOULD RUN OUT BEFORE YOU GOT THE MONEY TO BUY MORE.: NEVER TRUE

## 2025-07-18 SDOH — ECONOMIC STABILITY: HOUSING INSECURITY: IN THE LAST 12 MONTHS, WAS THERE A TIME WHEN YOU WERE NOT ABLE TO PAY THE MORTGAGE OR RENT ON TIME?: NO

## 2025-07-18 SDOH — SOCIAL STABILITY: SOCIAL INSECURITY: HAS ANYONE EVER THREATENED TO HURT YOUR FAMILY OR YOUR PETS?: NO

## 2025-07-18 SDOH — ECONOMIC STABILITY: FOOD INSECURITY: HOW HARD IS IT FOR YOU TO PAY FOR THE VERY BASICS LIKE FOOD, HOUSING, MEDICAL CARE, AND HEATING?: NOT HARD AT ALL

## 2025-07-18 SDOH — SOCIAL STABILITY: SOCIAL INSECURITY
WITHIN THE LAST YEAR, HAVE YOU BEEN KICKED, HIT, SLAPPED, OR OTHERWISE PHYSICALLY HURT BY YOUR PARTNER OR EX-PARTNER?: NO

## 2025-07-18 SDOH — ECONOMIC STABILITY: HOUSING INSECURITY: AT ANY TIME IN THE PAST 12 MONTHS, WERE YOU HOMELESS OR LIVING IN A SHELTER (INCLUDING NOW)?: NO

## 2025-07-18 SDOH — ECONOMIC STABILITY: TRANSPORTATION INSECURITY: IN THE PAST 12 MONTHS, HAS LACK OF TRANSPORTATION KEPT YOU FROM MEDICAL APPOINTMENTS OR FROM GETTING MEDICATIONS?: NO

## 2025-07-18 SDOH — SOCIAL STABILITY: SOCIAL INSECURITY
WITHIN THE LAST YEAR, HAVE YOU BEEN RAPED OR FORCED TO HAVE ANY KIND OF SEXUAL ACTIVITY BY YOUR PARTNER OR EX-PARTNER?: NO

## 2025-07-18 SDOH — ECONOMIC STABILITY: HOUSING INSECURITY: IN THE PAST 12 MONTHS, HOW MANY TIMES HAVE YOU MOVED WHERE YOU WERE LIVING?: 3

## 2025-07-18 SDOH — SOCIAL STABILITY: SOCIAL INSECURITY: WERE YOU ABLE TO COMPLETE ALL THE BEHAVIORAL HEALTH SCREENINGS?: YES

## 2025-07-18 SDOH — SOCIAL STABILITY: SOCIAL INSECURITY: ARE THERE ANY APPARENT SIGNS OF INJURIES/BEHAVIORS THAT COULD BE RELATED TO ABUSE/NEGLECT?: NO

## 2025-07-18 SDOH — SOCIAL STABILITY: SOCIAL INSECURITY: DO YOU FEEL ANYONE HAS EXPLOITED OR TAKEN ADVANTAGE OF YOU FINANCIALLY OR OF YOUR PERSONAL PROPERTY?: NO

## 2025-07-18 SDOH — SOCIAL STABILITY: SOCIAL INSECURITY: HAVE YOU HAD ANY THOUGHTS OF HARMING ANYONE ELSE?: NO

## 2025-07-18 SDOH — SOCIAL STABILITY: SOCIAL INSECURITY: DOES ANYONE TRY TO KEEP YOU FROM HAVING/CONTACTING OTHER FRIENDS OR DOING THINGS OUTSIDE YOUR HOME?: NO

## 2025-07-18 SDOH — SOCIAL STABILITY: SOCIAL INSECURITY: ARE YOU OR HAVE YOU BEEN THREATENED OR ABUSED PHYSICALLY, EMOTIONALLY, OR SEXUALLY BY ANYONE?: NO

## 2025-07-18 SDOH — SOCIAL STABILITY: SOCIAL INSECURITY: ABUSE: ADULT

## 2025-07-18 SDOH — SOCIAL STABILITY: SOCIAL INSECURITY: DO YOU FEEL UNSAFE GOING BACK TO THE PLACE WHERE YOU ARE LIVING?: NO

## 2025-07-18 ASSESSMENT — ACTIVITIES OF DAILY LIVING (ADL)
TOILETING: INDEPENDENT
ADEQUATE_TO_COMPLETE_ADL: YES
WALKS IN HOME: INDEPENDENT
DRESSING YOURSELF: INDEPENDENT
ASSISTIVE_DEVICE: EYEGLASSES;SHOWER CHAIR;WALKER
LACK_OF_TRANSPORTATION: NO
PATIENT'S MEMORY ADEQUATE TO SAFELY COMPLETE DAILY ACTIVITIES?: YES
GROOMING: INDEPENDENT
FEEDING YOURSELF: INDEPENDENT
LACK_OF_TRANSPORTATION: NO
BATHING: INDEPENDENT
HEARING - LEFT EAR: FUNCTIONAL
HEARING - RIGHT EAR: FUNCTIONAL
JUDGMENT_ADEQUATE_SAFELY_COMPLETE_DAILY_ACTIVITIES: YES

## 2025-07-18 ASSESSMENT — PAIN SCALES - GENERAL
PAINLEVEL_OUTOF10: 0 - NO PAIN

## 2025-07-18 ASSESSMENT — LIFESTYLE VARIABLES
HOW OFTEN DURING THE LAST YEAR HAVE YOU NEEDED AN ALCOHOLIC DRINK FIRST THING IN THE MORNING TO GET YOURSELF GOING AFTER A NIGHT OF HEAVY DRINKING: NEVER
HOW OFTEN DO YOU HAVE 6 OR MORE DRINKS ON ONE OCCASION: NEVER
AUDIT TOTAL SCORE: 0
AUDIT-C TOTAL SCORE: 3
HOW OFTEN DURING THE LAST YEAR HAVE YOU FAILED TO DO WHAT WAS NORMALLY EXPECTED FROM YOU BECAUSE OF DRINKING: NEVER
HOW OFTEN DO YOU HAVE A DRINK CONTAINING ALCOHOL: 2-3 TIMES A WEEK
SKIP TO QUESTIONS 9-10: 1
AUDIT TOTAL SCORE: 3
HAVE YOU OR SOMEONE ELSE BEEN INJURED AS A RESULT OF YOUR DRINKING: NO
HOW MANY STANDARD DRINKS CONTAINING ALCOHOL DO YOU HAVE ON A TYPICAL DAY: 1 OR 2
HOW OFTEN DURING THE LAST YEAR HAVE YOU HAD A FEELING OF GUILT OR REMORSE AFTER DRINKING: NEVER
HOW OFTEN DURING THE LAST YEAR HAVE YOU BEEN UNABLE TO REMEMBER WHAT HAPPENED THE NIGHT BEFORE BECAUSE YOU HAD BEEN DRINKING: NEVER
HAS A RELATIVE, FRIEND, DOCTOR, OR ANOTHER HEALTH PROFESSIONAL EXPRESSED CONCERN ABOUT YOUR DRINKING OR SUGGESTED YOU CUT DOWN: NO
HOW OFTEN DURING THE LAST YEAR HAVE YOU FOUND THAT YOU WERE NOT ABLE TO STOP DRINKING ONCE YOU HAD STARTED: NEVER
AUDIT-C TOTAL SCORE: 3

## 2025-07-18 ASSESSMENT — PATIENT HEALTH QUESTIONNAIRE - PHQ9
SUM OF ALL RESPONSES TO PHQ9 QUESTIONS 1 & 2: 0
2. FEELING DOWN, DEPRESSED OR HOPELESS: NOT AT ALL
1. LITTLE INTEREST OR PLEASURE IN DOING THINGS: NOT AT ALL

## 2025-07-18 ASSESSMENT — COGNITIVE AND FUNCTIONAL STATUS - GENERAL
MOBILITY SCORE: 18
TURNING FROM BACK TO SIDE WHILE IN FLAT BAD: A LITTLE
MOBILITY SCORE: 17
DAILY ACTIVITIY SCORE: 24
MOVING TO AND FROM BED TO CHAIR: A LITTLE
DAILY ACTIVITIY SCORE: 24
MOVING TO AND FROM BED TO CHAIR: A LITTLE
STANDING UP FROM CHAIR USING ARMS: A LITTLE
WALKING IN HOSPITAL ROOM: A LITTLE
CLIMB 3 TO 5 STEPS WITH RAILING: A LOT
WALKING IN HOSPITAL ROOM: A LITTLE
MOVING FROM LYING ON BACK TO SITTING ON SIDE OF FLAT BED WITH BEDRAILS: A LITTLE
STANDING UP FROM CHAIR USING ARMS: A LITTLE
CLIMB 3 TO 5 STEPS WITH RAILING: A LOT
TURNING FROM BACK TO SIDE WHILE IN FLAT BAD: A LITTLE
PATIENT BASELINE BEDBOUND: NO

## 2025-07-18 ASSESSMENT — PAIN - FUNCTIONAL ASSESSMENT
PAIN_FUNCTIONAL_ASSESSMENT: 0-10

## 2025-07-18 NOTE — ED PROVIDER NOTES
HPI   Chief Complaint   Patient presents with    Weakness, Gen     Patient presents to the ER with complaints of generalized weakness.  Per EMS patient was in the shower on his shower chair, and was unable to get up.  Wife called 911 and reported  has been acting abnormal during the day.  Upon arrival, patient was alert and oriented and able to answer questions.        HPI: 85-year-old male with a history of chronic kidney disease, CAD status post CABG, DM 2 presents with generalized weakness and confusion.  As per the patient's wife he has seemed confused throughout the day.  Tonight he was sitting on a bench in his shower and was unable to get up due to generalized weakness.  His wife called EMS.  The patient has no complaints and is denying headache, chest pain, shortness of breath, fever or chills      Limitations to history: None  Independent Historians: EMS  External Records Reviewed: HIE, outpatient notes, inpatient notes  ------------------------------------------------------------------------------------------------------------------------------------------  ROS: a ten point review of systems was performed and was negative except as per HPI.  ------------------------------------------------------------------------------------------------------------------------------------------  PMH / PSH: as per HPI, otherwise reviewed in EMR  MEDS: as per HPI, otherwise reviewed in EMR  ALLERGIES: as per HPI, otherwise reviewed in EMR  SocH:  as per HPI, otherwise reviewed in EMR  FH:  as per HPI, otherwise reviewed in EMR  ------------------------------------------------------------------------------------------------------------------------------------------  Physical Exam:  VS: As documented in the triage note and EMR flowsheet from this visit was reviewed  General: Well appearing. No acute distress.   Eyes:  Extraocular movements grossly intact. No scleral icterus. No discharge  HEENT:  Normocephalic.   Atraumatic  Neck: Moves neck freely. No gross masses  CV: Regular rhythm. No murmurs, rubs or gallops   Resp: Clear to auscultation bilaterally. No respiratory distress.    GI: Soft, no masses, nontender. No rebound tenderness or guarding  MSK: Symmetric muscle bulk. No deformities. No lower extremity edema.    Skin: Warm, dry, intact.   Neuro: No focal deficits.  A&O x3.  No facial droop.  No ataxia in the extremities or drift.  No visual field deficits.  No sensory deficits.  NIH stroke scale of 0  Psych: Appropriate for situation  ------------------------------------------------------------------------------------------------------------------------------------------  Hospital Course / Medical Decision Making:  Independent Interpretations: CT head  EKG as interpreted by me: Atrial fibrillation with an approximate rate of 108 bpm with no bundle branch block no signs of acute ischemia    MDM: ***    Discussion of Management with Other Providers:   I discussed the patient/results with: Emergency medicine team    Final diagnosis and disposition as below.    Results for orders placed or performed during the hospital encounter of 07/18/25  -CBC and Auto Differential:   Collection Time: 07/18/25 12:45 AM       Result                      Value             Ref Range           WBC                         22.5 (H)          4.4 - 11.3 x*       nRBC                        0.0               0.0 - 0.0 /1*       RBC                         4.70              4.50 - 5.90 *       Hemoglobin                  14.4              13.5 - 17.5 *       Hematocrit                  42.8              41.0 - 52.0 %       MCV                         91                80 - 100 fL         MCH                         30.6              26.0 - 34.0 *       MCHC                        33.6              32.0 - 36.0 *       RDW                         13.8              11.5 - 14.5 %       Platelets                   183               150 - 450 x1*        Neutrophils %               66.0              40.0 - 80.0 %       Immature Granulocytes *     0.6               0.0 - 0.9 %         Lymphocytes %               24.7              13.0 - 44.0 %       Monocytes %                 8.1               2.0 - 10.0 %        Eosinophils %               0.2               0.0 - 6.0 %         Basophils %                 0.4               0.0 - 2.0 %         Neutrophils Absolute        14.89 (H)         1.60 - 5.50 *       Immature Granulocytes *     0.14              0.00 - 0.50 *       Lymphocytes Absolute        5.56 (H)          0.80 - 3.00 *       Monocytes Absolute          1.83 (H)          0.05 - 0.80 *       Eosinophils Absolute        0.04              0.00 - 0.40 *       Basophils Absolute          0.08              0.00 - 0.10 *  CT head wo IV contrast    (Results Pending)                Patient History   Medical History[1]  Surgical History[2]  Family History[3]  Social History[4]    Physical Exam   ED Triage Vitals [07/18/25 0049]   Temperature Heart Rate Respirations BP   37 °C (98.6 °F) (!) 108 18 133/85      Pulse Ox Temp Source Heart Rate Source Patient Position   96 % Oral Monitor Sitting      BP Location FiO2 (%)     Right arm --       Physical Exam      ED Course & MDM                  No data recorded     [place] Coma Scale Score: 15 (07/18/25 0050 : Marah Wilcox, REED)                           Medical Decision Making      Procedure  Procedures       [1]   Past Medical History:  Diagnosis Date    Diabetes mellitus (Multi)     Hyperlipemia     Hypertension    [2]   Past Surgical History:  Procedure Laterality Date    MR HEAD ANGIO WO IV CONTRAST  10/23/2016    MR HEAD ANGIO WO IV CONTRAST 10/23/2016 [facility] ANCILLARY LEGACY    NOSE SURGERY      TONSILLECTOMY      VASECTOMY     [3]   Family History  Problem Relation Name Age of Onset    Cancer Mother      Other (Heart problems) Father      Other (Heart problems) Mother's Sister      Lung cancer Father's Brother       Alzheimer's disease Father's Brother      Other (Heart problems) Maternal Grandfather     [4]   Social History  Tobacco Use    Smoking status: Never    Smokeless tobacco: Never   Substance Use Topics    Alcohol use: Yes     Comment: Occasionally    Drug use: Never      disease.                      MACRO:    None          Signed by: Ryland Martin 7/18/2025 1:14 AM    Dictation workstation:   [id number]                     Patient History   Medical History[1]  Surgical History[2]  Family History[3]  Social History[4]    Physical Exam   ED Triage Vitals [07/18/25 0049]   Temperature Heart Rate Respirations BP   37 °C (98.6 °F) (!) 108 18 133/85      Pulse Ox Temp Source Heart Rate Source Patient Position   96 % Oral Monitor Sitting      BP Location FiO2 (%)     Right arm --       Physical Exam      ED Course & MDM   Diagnoses as of 07/29/25 1845   Sepsis, due to unspecified organism, unspecified whether acute organ dysfunction present (Multi)   Urinary tract infection without hematuria, site unspecified                 No data recorded     [place] Coma Scale Score: 15 (07/18/25 0050 : Marah Wilcox, REED)                           Medical Decision Making      Procedure  Critical Care    Performed by: Jovan Pompa DO  Authorized by: Jovan Pompa DO    Critical care provider statement:     Critical care time (minutes):  35    Critical care time was exclusive of:  Separately billable procedures and treating other patients    Critical care was necessary to treat or prevent imminent or life-threatening deterioration of the following conditions:  Sepsis    Critical care was time spent personally by me on the following activities:  Re-evaluation of patient's condition, ordering and review of laboratory studies, ordering and review of radiographic studies, examination of patient and development of treatment plan with patient or surrogate         [1]   Past Medical History:  Diagnosis Date    Diabetes mellitus (Multi)     Hyperlipemia     Hypertension    [2]   Past Surgical History:  Procedure Laterality Date    MR HEAD ANGIO WO IV CONTRAST  10/23/2016    MR HEAD ANGIO WO IV CONTRAST 10/23/2016 [facility] ANCILLARY LEGACY    NOSE SURGERY      TONSILLECTOMY      VASECTOMY     [3]   Family  History  Problem Relation Name Age of Onset    Cancer Mother      Other (Heart problems) Father      Other (Heart problems) Mother's Sister      Lung cancer Father's Brother      Alzheimer's disease Father's Brother      Other (Heart problems) Maternal Grandfather     [4]   Social History  Tobacco Use    Smoking status: Never    Smokeless tobacco: Never   Substance Use Topics    Alcohol use: Yes     Comment: Occasionally    Drug use: Never        Jovan Pompa,   07/29/25 [id number]

## 2025-07-18 NOTE — PROGRESS NOTES
Transitional Care Coordination Progress Note:  Plan per Medical/Surgical team: treatment of sepsis & UTI with IV ATB, IV fluids   Status: Inpatient   Payor source: medicare A/B, [employer]  Discharge disposition: Home with wife, ?new [facility]  Potential Barriers: lactate 3.1, glucose 238, renal 32/2.16, BP 89/66  ADOD: 7/21/2025   JIAN Abad RN, BSN Transitional Care Coordinator ED# [phone]      07/18/25 0706   Discharge Planning   Living Arrangements Spouse/significant other   Support Systems Spouse/significant other   Assistance Needed IV ATB, IV fluids   Type of Residence Private residence   Number of Stairs to Enter Residence 2   Number of Stairs Within Residence 0   Home or Post Acute Services In home services   Type of Home Care Services Home nursing visits;Home OT;Home PT   Expected Discharge Disposition Home H   Does the patient need discharge transport arranged? No   Financial Resource Strain   How hard is it for you to pay for the very basics like food, housing, medical care, and heating? Not hard   Housing Stability   In the last 12 months, was there a time when you were not able to pay the mortgage or rent on time? N   In the past 12 months, how many times have you moved where you were living? 0   At any time in the past 12 months, were you homeless or living in a shelter (including now)? N   Transportation Needs   In the past 12 months, has lack of transportation kept you from medical appointments or from getting medications? no   In the past 12 months, has lack of transportation kept you from meetings, work, or from getting things needed for daily living? No   Stroke Family Assessment   Stroke Family Assessment Needed No   Intensity of Service   Intensity of Service 0-30 min

## 2025-07-18 NOTE — PROGRESS NOTES
07/18/25 0705   [facility] Disability Status   Are you deaf or do you have serious difficulty hearing? N   Are you blind or do you have serious difficulty seeing, even when wearing glasses? N   Because of a physical, mental, or emotional condition, do you have serious difficulty concentrating, remembering, or making decisions? (5 years old or older) N   Do you have serious difficulty walking or climbing stairs? Y  (new weakness)   Do you have serious difficulty dressing or bathing? N   Because of a physical, mental, or emotional condition, do you have serious difficulty doing errands alone such as visiting the doctor? N

## 2025-07-18 NOTE — PROGRESS NOTES
Emergency Medicine Transition of Care Note.    I received Mauro Grande in signout from Dr. Pompa.  Please see the previous ED provider note for all HPI, PE and MDM up to the time of signout. This is in addition to the primary record.    In brief Mauro Grande is an 85 y.o. male presenting for   Chief Complaint   Patient presents with    Weakness, Gen     Patient presents to the ER with complaints of generalized weakness.  Per EMS patient was in the shower on his shower chair, and was unable to get up.  Wife called 911 and reported  has been acting abnormal during the day.  Upon arrival, patient was alert and oriented and able to answer questions.      At the time of signout we were awaiting: Admit    Diagnoses as of 07/18/25 0543   Sepsis, due to unspecified organism, unspecified whether acute organ dysfunction present (Multi)   Urinary tract infection without hematuria, site unspecified       Medical Decision Making  Patient to be admitted.  The patient had a sepsis reperfusion exam at 0 600.  Despite fluids his lactate is worsening.  Patient's blood pressure and heart rate are improved and he feels improved.    Final diagnoses:   [A41.9] Sepsis, due to unspecified organism, unspecified whether acute organ dysfunction present (Multi)   [N39.0] Urinary tract infection without hematuria, site unspecified           Procedure  Procedures    Savage Shin MD

## 2025-07-18 NOTE — PROGRESS NOTES
Pharmacy Medication History    Spoke to the patient, 1 change.  Meds taken yesterday morning    Source of Information:     Additional concerns with the patient's PTA list.     Notified Provider via Haiku : Yes    The following updates were made to the Prior to Admission medication list:     Medications ADDED:     Medications CHANGED:  Losartan 1/2 tab  Medications REMOVED:     Medications NOT TAKING:       Allergy reviewed : Yes    Meds 2 Beds : Yes    Outpatient pharmacy confirmed and updated in chart : Yes    Pharmacy name: [email or web address]    The list below reflectives the updated PTA list. Please review each medication in order reconciliation for additional clarification and justification.    Prior to Admission Medications   Prescriptions Last Dose   Eliquis 2.5 mg tablet 7/17/2025 Morning   Sig: TAKE 1 TABLET BY MOUTH TWICE  DAILY   atorvastatin (Lipitor) 40 mg tablet 7/17/2025 Morning   Sig: TAKE 1 TABLET BY MOUTH ONCE  DAILY   cholecalciferol (Vitamin D-3) 50 MCG (2000 UT) tablet 7/17/2025 Morning   Sig: Take 1 tablet (50 mcg) by mouth once daily.   empagliflozin (Jardiance) 25 mg 7/17/2025 Morning   Sig: Take 1 tablet (25 mg) by mouth once daily. Dispense after 1/1/25   glipiZIDE XL (Glucotrol XL) 10 mg 24 hr tablet 7/17/2025 Morning   Sig: Take 1 tablet (10 mg) by mouth 2 times a day before meals.   hydroCHLOROthiazide (HYDRODiuril) 25 mg tablet 7/17/2025 Morning   Sig: TAKE 1 TABLET BY MOUTH ONCE  DAILY   latanoprost (Xalatan) 0.005 % ophthalmic solution 7/16/2025 Bedtime   Sig: Administer 1 drop into both eyes once daily at bedtime.   losartan (Cozaar) 25 mg tablet 7/17/2025 Morning   Sig: Take 0.5 tablets (12.5 mg) by mouth once daily.   metFORMIN XR (Glucophage-XR) 500 mg 24 hr tablet 7/17/2025 Morning   Sig: Take 2 tablets (1,000 mg) by mouth once daily with breakfast. Do not crush, chew, or split.   metoprolol succinate XL (Toprol-XL) 25 mg 24 hr tablet 7/17/2025 Morning   Sig: Take 1 tablet (25 mg) by mouth  once daily.   omeprazole (PriLOSEC) 20 mg DR capsule 7/16/2025 Bedtime   Sig: Take 1 capsule (20 mg) by mouth once daily at bedtime.      Facility-Administered Medications: None       The list below reflectives the updated allergy list. Please review each documented allergy for additional clarification and justification.    No Known Allergies       07/18/25 at 7:36 AM - Carol Healy

## 2025-07-18 NOTE — PROGRESS NOTES
Emergency Medicine Transition of Care Note.    I received Mauro Grande in signout from Dr. Pompa.  Please see the previous ED provider note for all HPI, PE and MDM up to the time of signout. This is in addition to the primary record.    In brief Mauro Grande is an 85 y.o. male presenting for   Chief Complaint   Patient presents with    Weakness, Gen     Patient presents to the ER with complaints of generalized weakness.  Per EMS patient was in the shower on his shower chair, and was unable to get up.  Wife called 911 and reported  has been acting abnormal during the day.  Upon arrival, patient was alert and oriented and able to answer questions.      At the time of signout we were awaiting: Admission    Diagnoses as of 07/18/25 0349   Sepsis, due to unspecified organism, unspecified whether acute organ dysfunction present (Multi)   Urinary tract infection without hematuria, site unspecified       Medical Decision Making  I performed a sepsis reperfusion evaluation at 0 400 unremarkable blood pressure.    Final diagnoses:   [A41.9] Sepsis, due to unspecified organism, unspecified whether acute organ dysfunction present (Multi)   [N39.0] Urinary tract infection without hematuria, site unspecified           Procedure  Procedures    Savage Shin MD

## 2025-07-18 NOTE — H&P
History of present illness:  This is a 85 y.o. male with PMH of DMT2, HLD, HTN, CKD (baseline SCr 1.9-2.2, follows with Dr. Kline), CAD s/p CABG, PAF on Eliquis, presented with generalized weakness.  Patient states slipped on a rug getting out of the shower yesterday, lowered himself down and was unable to get up independently-AMS summoned.  Patient's wife thought he was confused yesterday, otherwise he denies recent signs of infection, or new urinary symptoms, fever/chills.  Workup notable for mild tachycardia (A-fib 109 bpm on EKG), no acute ischemia), tachypnea, BP overall stable aside an isolated reading of 89/66, labs with baseline BUN/creatinine elevation, mildly elevated total bili 1.3 with stable LFTs otherwise lactate elevated 2.6/3.1/2.7, leukocytosis 22.5 (neutrophil/lymphocyte predominant) UA consistent with UTI, urine and blood cultures collected; CT head with chronic white matter changes, probable sequela of small vessel ischemic disease, no acute findings.  Patient was given IVF bolus x 2, started on continuous IV fluids, received a dose of IV Vanco and Zosyn in the ER, transition to IV Rocephin.  Today patient denies specific complaints.  Reports baseline urinary incontinence/urgency/incomplete emptying for ~ 2 years.   He reports occasional exertional SOB and mid chest discomfort a few times a month, none since admission.  Follows with Dr. Aden.     Full ROS done and negative except as stated above.      Surgical History:  CABG, tonsillectomy; vasectomy.    Social History  Socioeconomic History    Marital status:    Tobacco Use    Smoking status: Never    Smokeless tobacco: Never   Substance and Sexual Activity    Alcohol use: Yes     Comment: Occasionally    Drug use: Never   Social History Narrative    Lives in an apt with his wife     Retired at age 67 and worked part time until 83          Family History[1]     Home meds:  Current Outpatient Medications   Medication Instructions     atorvastatin (LIPITOR) 40 mg, oral, Daily    cholecalciferol (Vitamin D-3) 50 MCG (2000 UT) tablet 1 tablet, Daily    Eliquis 2.5 mg, oral, 2 times daily    empagliflozin (JARDIANCE) 25 mg, oral, Daily, Dispense after 1/1/25    glipiZIDE XL (GLUCOTROL XL) 10 mg, oral, 2 times daily before meals    hydroCHLOROthiazide (HYDRODIURIL) 25 mg, oral, Daily    latanoprost (Xalatan) 0.005 % ophthalmic solution Administer 1 drop into both eyes once daily at bedtime.    losartan (COZAAR) 12.5 mg, Daily    metFORMIN XR (GLUCOPHAGE-XR) 1,000 mg, oral, Daily with breakfast, Do not crush, chew, or split.    metoprolol succinate XL (TOPROL-XL) 25 mg, oral, Daily    omeprazole (PRILOSEC) 20 mg, oral, Nightly     Vitals (Last 24 Hours):  Heart Rate:  []   Temp:  [36.7 °C (98.1 °F)-37 °C (98.6 °F)]   Resp:  [17-31]   BP: ()/(62-91)   SpO2:  [92 %-98 %]      PHYSICAL EXAM:  Constitutional: NAD, alert and cooperative  Eyes: no icterus  ENMT: mucous membranes moist, no lesions  Head/Neck: supple, no JVD  Respiratory/Thorax: upper airway expiratory wheezing bilaterally, non-labored breathing at rest, no cough, on RA  Cardiovascular: irregular, no murmurs heard  Gastrointestinal: ND/S/NT  : no Garcia, no SP/flank discomfort  Musculoskeletal: no joint swelling, ROM intact  Extremities: trace BLE edema  Neurological: non-focal  Skin: warm and dry  Psych: calm, stable mood     MEDS:  apixaban, 2.5 mg, oral, BID  atorvastatin, 40 mg, oral, Nightly  cholecalciferol, 50 mcg, oral, Daily  glipiZIDE XL, 5 mg, oral, BID AC  insulin lispro, 0-10 Units, subcutaneous, TID AC  latanoprost, 1 drop, Both Eyes, Nightly  losartan, 12.5 mg, oral, Daily  metoprolol succinate XL, 25 mg, oral, Daily  pantoprazole, 40 mg, oral, Daily before breakfast  piperacillin-tazobactam, 2.25 g, intravenous, q6h  potassium chloride CR, 20 mEq, oral, Once  sennosides, 2 tablet, oral, BID      I have reviewed all imaging reports and labs pertinent to this  visit    ASSESSMENT/PLAN:  85 y.o. male with PMH of DMT2, HLD, HTN, CKD (baseline SCr 1.9-2.2, follows with Dr. Kline), CAD s/p CABG, PAF on Eliquis, presented with generalized weakness, recent confusion as reported by family.   Workup notable for mild tachycardia (A-fib 109 bpm on EKG), no acute ischemia), tachypnea, BP overall stable aside an isolated reading of 89/66, labs with baseline BUN/creatinine elevation, mildly elevated total bili 1.3 with stable LFTs otherwise lactate elevated 2.6/3.1/2.7, leukocytosis 22.5 (neutrophil/lymphocyte predominant), UA consistent with UTI, urine and blood cultures collected; CT head with chronic white matter changes, probable sequela of small vessel ischemic disease, no acute findings.    Sepsis (UTI, leukocytosis, tachycardia, lactic acidosis, encephalopathy)   -afebrile, lactate normalized, mentation at baseline, has persistent wbc elevation today   -continue zosyn, FU bld and Ucx   -bladder scan for PVR   -holding Jardiance    Exp wheezing on exam  S/p large volume IVF resuscitation   Hx CAD   Hx VARGAS for months, occasional CP  -stop further IVF, check bnp,/CXR, update TTE, monitor on tele   -consider Lasix dose based on clinical course/exam   -oupt FU with Dr Aden     DMT2 A1c 9.4 April'25  -metformin on hold, glipizide dose reduced for now, corrective scale Insulin in place, update A1c     CKD  -at baseline     A-Fib   -continue eliquis, BB    HypoK  -repleted     HTN  Isolated low BP  -monitor on home meds     HLD  -continue statin     VTE / GI prophylaxis   -on eliquis, PPI, bowel regimen in place     Discharge planning  -PT/OT    Discussed with Dr. Verónica Middleton, APRN-CNP          [1]   Family History  Problem Relation Name Age of Onset    Cancer Mother      Other (Heart problems) Father      Other (Heart problems) Mother's Sister      Lung cancer Father's Brother      Alzheimer's disease Father's Brother      Other (Heart problems) Maternal Grandfather

## 2025-07-19 LAB
ANION GAP SERPL CALC-SCNC: 12 MMOL/L (ref 10–20)
BUN SERPL-MCNC: 24 MG/DL (ref 6–23)
CALCIUM SERPL-MCNC: 8.7 MG/DL (ref 8.6–10.3)
CHLORIDE SERPL-SCNC: 104 MMOL/L (ref 98–107)
CO2 SERPL-SCNC: 27 MMOL/L (ref 21–32)
CREAT SERPL-MCNC: 2.06 MG/DL (ref 0.5–1.3)
EGFRCR SERPLBLD CKD-EPI 2021: 31 ML/MIN/1.73M*2
ERYTHROCYTE [DISTWIDTH] IN BLOOD BY AUTOMATED COUNT: 14.1 % (ref 11.5–14.5)
GLUCOSE BLD MANUAL STRIP-MCNC: 142 MG/DL (ref 74–99)
GLUCOSE BLD MANUAL STRIP-MCNC: 230 MG/DL (ref 74–99)
GLUCOSE BLD MANUAL STRIP-MCNC: 65 MG/DL (ref 74–99)
GLUCOSE SERPL-MCNC: 67 MG/DL (ref 74–99)
HCT VFR BLD AUTO: 37.7 % (ref 41–52)
HGB BLD-MCNC: 12.6 G/DL (ref 13.5–17.5)
MCH RBC QN AUTO: 30.4 PG (ref 26–34)
MCHC RBC AUTO-ENTMCNC: 33.4 G/DL (ref 32–36)
MCV RBC AUTO: 91 FL (ref 80–100)
NRBC BLD-RTO: 0 /100 WBCS (ref 0–0)
PLATELET # BLD AUTO: 157 X10*3/UL (ref 150–450)
POTASSIUM SERPL-SCNC: 2.9 MMOL/L (ref 3.5–5.3)
RBC # BLD AUTO: 4.14 X10*6/UL (ref 4.5–5.9)
SODIUM SERPL-SCNC: 140 MMOL/L (ref 136–145)
WBC # BLD AUTO: 18.3 X10*3/UL (ref 4.4–11.3)

## 2025-07-19 PROCEDURE — 2500000002 HC RX 250 W HCPCS SELF ADMINISTERED DRUGS (ALT 637 FOR MEDICARE OP, ALT 636 FOR OP/ED): Performed by: NURSE PRACTITIONER

## 2025-07-19 PROCEDURE — 85027 COMPLETE CBC AUTOMATED: CPT | Performed by: NURSE PRACTITIONER

## 2025-07-19 PROCEDURE — 2500000001 HC RX 250 WO HCPCS SELF ADMINISTERED DRUGS (ALT 637 FOR MEDICARE OP): Performed by: NURSE PRACTITIONER

## 2025-07-19 PROCEDURE — 97161 PT EVAL LOW COMPLEX 20 MIN: CPT | Mod: GP

## 2025-07-19 PROCEDURE — 97530 THERAPEUTIC ACTIVITIES: CPT | Mod: GP

## 2025-07-19 PROCEDURE — 2500000001 HC RX 250 WO HCPCS SELF ADMINISTERED DRUGS (ALT 637 FOR MEDICARE OP): Performed by: HOSPITALIST

## 2025-07-19 PROCEDURE — 80048 BASIC METABOLIC PNL TOTAL CA: CPT | Performed by: NURSE PRACTITIONER

## 2025-07-19 PROCEDURE — 1200000002 HC GENERAL ROOM WITH TELEMETRY DAILY

## 2025-07-19 PROCEDURE — 2500000004 HC RX 250 GENERAL PHARMACY W/ HCPCS (ALT 636 FOR OP/ED): Performed by: NURSE PRACTITIONER

## 2025-07-19 PROCEDURE — 2500000005 HC RX 250 GENERAL PHARMACY W/O HCPCS: Performed by: NURSE PRACTITIONER

## 2025-07-19 PROCEDURE — 82947 ASSAY GLUCOSE BLOOD QUANT: CPT

## 2025-07-19 PROCEDURE — 97116 GAIT TRAINING THERAPY: CPT | Mod: GP

## 2025-07-19 PROCEDURE — 99232 SBSQ HOSP IP/OBS MODERATE 35: CPT | Performed by: NURSE PRACTITIONER

## 2025-07-19 PROCEDURE — 36415 COLL VENOUS BLD VENIPUNCTURE: CPT | Performed by: NURSE PRACTITIONER

## 2025-07-19 RX ORDER — LIDOCAINE 560 MG/1
1 PATCH PERCUTANEOUS; TOPICAL; TRANSDERMAL DAILY
Status: DISPENSED | OUTPATIENT
Start: 2025-07-19

## 2025-07-19 RX ORDER — POTASSIUM CHLORIDE 14.9 MG/ML
20 INJECTION INTRAVENOUS ONCE
Status: COMPLETED | OUTPATIENT
Start: 2025-07-19 | End: 2025-07-19

## 2025-07-19 RX ORDER — ACETAMINOPHEN 325 MG/1
650 TABLET ORAL EVERY 4 HOURS PRN
Status: ACTIVE | OUTPATIENT
Start: 2025-07-19

## 2025-07-19 RX ORDER — POTASSIUM CHLORIDE 1.5 G/1.58G
20 POWDER, FOR SOLUTION ORAL ONCE
Status: COMPLETED | OUTPATIENT
Start: 2025-07-19 | End: 2025-07-19

## 2025-07-19 RX ORDER — LIDOCAINE 560 MG/1
1 PATCH PERCUTANEOUS; TOPICAL; TRANSDERMAL DAILY
Status: DISCONTINUED | OUTPATIENT
Start: 2025-07-19 | End: 2025-07-19

## 2025-07-19 RX ADMIN — Medication 50 MCG: at 08:24

## 2025-07-19 RX ADMIN — INSULIN LISPRO 4 UNITS: 100 INJECTION, SOLUTION INTRAVENOUS; SUBCUTANEOUS at 16:52

## 2025-07-19 RX ADMIN — PIPERACILLIN SODIUM AND TAZOBACTAM SODIUM 2.25 G: 2; .25 INJECTION, SOLUTION INTRAVENOUS at 16:54

## 2025-07-19 RX ADMIN — GLIPIZIDE 5 MG: 2.5 TABLET, EXTENDED RELEASE ORAL at 16:52

## 2025-07-19 RX ADMIN — POTASSIUM CHLORIDE 20 MEQ: 1.5 POWDER, FOR SOLUTION ORAL at 12:58

## 2025-07-19 RX ADMIN — METOPROLOL SUCCINATE 25 MG: 25 TABLET, EXTENDED RELEASE ORAL at 08:24

## 2025-07-19 RX ADMIN — HYDROCHLOROTHIAZIDE 25 MG: 25 TABLET ORAL at 08:24

## 2025-07-19 RX ADMIN — PIPERACILLIN SODIUM AND TAZOBACTAM SODIUM 2.25 G: 2; .25 INJECTION, SOLUTION INTRAVENOUS at 04:16

## 2025-07-19 RX ADMIN — LIDOCAINE 4% 1 PATCH: 40 PATCH TOPICAL at 02:25

## 2025-07-19 RX ADMIN — POTASSIUM CHLORIDE 20 MEQ: 14.9 INJECTION, SOLUTION INTRAVENOUS at 08:34

## 2025-07-19 RX ADMIN — PIPERACILLIN SODIUM AND TAZOBACTAM SODIUM 2.25 G: 2; .25 INJECTION, SOLUTION INTRAVENOUS at 22:06

## 2025-07-19 RX ADMIN — ACETAMINOPHEN 650 MG: 325 TABLET ORAL at 02:19

## 2025-07-19 RX ADMIN — LATANOPROST 1 DROP: 50 SOLUTION/ DROPS OPHTHALMIC at 21:05

## 2025-07-19 RX ADMIN — GLIPIZIDE 5 MG: 2.5 TABLET, EXTENDED RELEASE ORAL at 06:31

## 2025-07-19 RX ADMIN — LOSARTAN POTASSIUM 12.5 MG: 25 TABLET, FILM COATED ORAL at 08:24

## 2025-07-19 RX ADMIN — APIXABAN 2.5 MG: 2.5 TABLET, FILM COATED ORAL at 21:05

## 2025-07-19 RX ADMIN — APIXABAN 2.5 MG: 2.5 TABLET, FILM COATED ORAL at 08:24

## 2025-07-19 RX ADMIN — ATORVASTATIN CALCIUM 40 MG: 40 TABLET, FILM COATED ORAL at 21:05

## 2025-07-19 RX ADMIN — PIPERACILLIN SODIUM AND TAZOBACTAM SODIUM 2.25 G: 2; .25 INJECTION, SOLUTION INTRAVENOUS at 10:45

## 2025-07-19 RX ADMIN — PANTOPRAZOLE SODIUM 40 MG: 40 TABLET, DELAYED RELEASE ORAL at 06:31

## 2025-07-19 ASSESSMENT — COGNITIVE AND FUNCTIONAL STATUS - GENERAL
STANDING UP FROM CHAIR USING ARMS: A LITTLE
TURNING FROM BACK TO SIDE WHILE IN FLAT BAD: A LITTLE
MOBILITY SCORE: 16
MOBILITY SCORE: 18
WALKING IN HOSPITAL ROOM: A LITTLE
TURNING FROM BACK TO SIDE WHILE IN FLAT BAD: A LITTLE
MOVING TO AND FROM BED TO CHAIR: A LITTLE
STANDING UP FROM CHAIR USING ARMS: A LOT
DAILY ACTIVITIY SCORE: 24
CLIMB 3 TO 5 STEPS WITH RAILING: A LOT
MOVING FROM LYING ON BACK TO SITTING ON SIDE OF FLAT BED WITH BEDRAILS: A LITTLE
WALKING IN HOSPITAL ROOM: A LITTLE
CLIMB 3 TO 5 STEPS WITH RAILING: A LOT
MOVING TO AND FROM BED TO CHAIR: A LITTLE

## 2025-07-19 ASSESSMENT — PAIN - FUNCTIONAL ASSESSMENT
PAIN_FUNCTIONAL_ASSESSMENT: 0-10

## 2025-07-19 ASSESSMENT — PAIN SCALES - GENERAL
PAINLEVEL_OUTOF10: 0 - NO PAIN
PAINLEVEL_OUTOF10: 10 - WORST POSSIBLE PAIN

## 2025-07-19 ASSESSMENT — PAIN DESCRIPTION - ORIENTATION: ORIENTATION: LEFT

## 2025-07-19 ASSESSMENT — PAIN DESCRIPTION - LOCATION: LOCATION: FOOT

## 2025-07-19 ASSESSMENT — ACTIVITIES OF DAILY LIVING (ADL): ADL_ASSISTANCE: INDEPENDENT

## 2025-07-19 ASSESSMENT — PAIN DESCRIPTION - DESCRIPTORS: DESCRIPTORS: OTHER (COMMENT);SHARP

## 2025-07-19 NOTE — PROGRESS NOTES
"Mauro Grande is a 85 y.o. male on day 1 of admission presenting with Sepsis, due to unspecified organism, unspecified whether acute organ dysfunction present (Multi).    Subjective   Admitted after a fall at home  UA consistent with UTI  Urine and blood cultures pending   ID consulted   Left ankle pain >lidoderm patch and hot pack   Low potassium treat   Needs PT/OT eval       Objective     Physical Exam  Constitutional:       Appearance: Normal appearance.   HENT:      Mouth/Throat:      Mouth: Mucous membranes are moist.     Cardiovascular:      Rate and Rhythm: Regular rhythm.   Pulmonary:      Effort: Pulmonary effort is normal.      Breath sounds: Normal breath sounds.   Abdominal:      Palpations: Abdomen is soft.     Musculoskeletal:         General: Normal range of motion.     Skin:     General: Skin is warm and dry.     Neurological:      General: No focal deficit present.      Mental Status: He is alert and oriented to person, place, and time.      Comments: Answers all questions  Aware of events prior to hospitalization        Last Recorded Vitals  Blood pressure 121/64, pulse 79, temperature 36.3 °C (97.3 °F), temperature source Temporal, resp. rate 15, height 1.753 m (5' 9\"), weight 96.6 kg (212 lb 15.4 oz), SpO2 95%.  Intake/Output last 3 Shifts:  I/O last 3 completed shifts:  In: 3570 (37 mL/kg) [P.O.:240; IV Piggyback:3330]  Out: 1950 (20.2 mL/kg) [Urine:1950 (0.6 mL/kg/hr)]  Weight: 96.6 kg     Relevant Results  Results for orders placed or performed during the hospital encounter of 07/18/25 (from the past 24 hours)   POCT GLUCOSE   Result Value Ref Range    POCT Glucose 137 (H) 74 - 99 mg/dL   Basic Metabolic Panel   Result Value Ref Range    Glucose 67 (L) 74 - 99 mg/dL    Sodium 140 136 - 145 mmol/L    Potassium 2.9 (LL) 3.5 - 5.3 mmol/L    Chloride 104 98 - 107 mmol/L    Bicarbonate 27 21 - 32 mmol/L    Anion Gap 12 10 - 20 mmol/L    Urea Nitrogen 24 (H) 6 - 23 mg/dL    Creatinine 2.06 (H) " 0.50 - 1.30 mg/dL    eGFR 31 (L) >60 mL/min/1.73m*2    Calcium 8.7 8.6 - 10.3 mg/dL   CBC   Result Value Ref Range    WBC 18.3 (H) 4.4 - 11.3 x10*3/uL    nRBC 0.0 0.0 - 0.0 /100 WBCs    RBC 4.14 (L) 4.50 - 5.90 x10*6/uL    Hemoglobin 12.6 (L) 13.5 - 17.5 g/dL    Hematocrit 37.7 (L) 41.0 - 52.0 %    MCV 91 80 - 100 fL    MCH 30.4 26.0 - 34.0 pg    MCHC 33.4 32.0 - 36.0 g/dL    RDW 14.1 11.5 - 14.5 %    Platelets 157 150 - 450 x10*3/uL   POCT GLUCOSE   Result Value Ref Range    POCT Glucose 65 (L) 74 - 99 mg/dL   POCT GLUCOSE   Result Value Ref Range    POCT Glucose 142 (H) 74 - 99 mg/dL   POCT GLUCOSE   Result Value Ref Range    POCT Glucose 230 (H) 74 - 99 mg/dL      Transthoracic Echo Complete   Final Result      XR chest 1 view   Final Result   1. No discrete consolidation.             I personally reviewed the images/study, and I agree with the findings   as stated above by resident physician Dr. Josefina Patton MD. This   study was interpreted at [facility], [place], [place].        MACRO:   None        Signed by: Fabi Paula 7/18/2025 12:35 PM   Dictation workstation:   [id number]      CT head wo IV contrast   Final Result   1. No acute intracranial abnormality identified.   2. Chronic white matter changes, likely sequela of small-vessel   ischemic disease.                  MACRO:   None        Signed by: Ryland Martin 7/18/2025 1:14 AM   Dictation workstation:   [id number]         85 y.o. male with PMH of DMT2, HLD, HTN, CKD (baseline SCr 1.9-2.2, follows with Dr. Kline), CAD s/p CABG, PAF on Eliquis, presented with generalized weakness, recent confusion as reported by family.   Workup notable for mild tachycardia (A-fib 109 bpm on EKG), no acute ischemia), tachypnea, BP overall stable aside an isolated reading of 89/66, labs with baseline BUN/creatinine elevation, mildly elevated total bili 1.3 with stable LFTs otherwise lactate elevated 2.6/3.1/2.7, leukocytosis 22.5  (neutrophil/lymphocyte predominant), UA consistent with UTI, urine and blood cultures collected; CT head with chronic white matter changes, probable sequela of small vessel ischemic disease, no acute findings.    Assessment & Plan    Sepsis (UTI, leukocytosis, tachycardia, lactic acidosis, encephalopathy)   -afebrile, lactate normalized, mentation at baseline, has persistent wbc elevation today   -continue zosyn, FU bld and Ucx   - ID consulted   -bladder scan for PVR   -holding Jardiance     Exp wheezing on exam  S/p large volume IVF resuscitation   Hx CAD   Hx VARGAS for months, occasional CP  -stop further IVF, check bnp,/CXR, update TTE, monitor on tele   -consider Lasix dose based on clinical course/exam   -oupt FU with Dr Aden      DMT2 A1c 9.4 April'25  -metformin on hold, glipizide dose reduced for now, corrective scale Insulin in place, update A1c      CKD  -at baseline      A-Fib   -continue eliquis, BB     HypoK  -repleting      HTN  Isolated low BP  -monitor on home meds      HLD  -continue statin      VTE / GI prophylaxis   -on eliquis, PPI, bowel regimen in place      Discharge planning  -PT/OT       DANIELA Mayers-CNP  Seen and discussed with

## 2025-07-19 NOTE — CARE PLAN
The patient's goals for the shift include To get rest and medicine    The clinical goals for the shift include pt will remain HDS, safe and free from falls throughout shift    Over the shift, the patient did make progress toward the following goals. Barriers to progression include       Problem: Pain - Adult  Goal: Verbalizes/displays adequate comfort level or baseline comfort level  Outcome: Progressing  Flowsheets (Taken 7/19/2025 1335)  Verbalizes/displays adequate comfort level or baseline comfort level:   Encourage patient to monitor pain and request assistance   Assess pain using appropriate pain scale   Administer analgesics based on type and severity of pain and evaluate response   Implement non-pharmacological measures as appropriate and evaluate response   Consider cultural and social influences on pain and pain management   Notify Licensed Independent Practitioner if interventions unsuccessful or patient reports new pain     Problem: Safety - Adult  Goal: Free from fall injury  Outcome: Progressing  Flowsheets (Taken 7/19/2025 1335)  Free from fall injury: Instruct family/caregiver on patient safety     Problem: Discharge Planning  Goal: Discharge to home or other facility with appropriate resources  Outcome: Progressing  Flowsheets (Taken 7/19/2025 1335)  Discharge to home or other facility with appropriate resources:   Identify barriers to discharge with patient and caregiver   Arrange for needed discharge resources and transportation as appropriate   Identify discharge learning needs (meds, wound care, etc)   Arrange for interpreters to assist at discharge as needed   Refer to discharge planning if patient needs post-hospital services based on physician order or complex needs related to functional status, cognitive ability or social support system     Problem: Chronic Conditions and Co-morbidities  Goal: Patient's chronic conditions and co-morbidity symptoms are monitored and maintained or  improved  Outcome: Progressing  Flowsheets (Taken 7/19/2025 1335)  Care Plan - Patient's Chronic Conditions and Co-Morbidity Symptoms are Monitored and Maintained or Improved:   Monitor and assess patient's chronic conditions and comorbid symptoms for stability, deterioration, or improvement   Collaborate with multidisciplinary team to address chronic and comorbid conditions and prevent exacerbation or deterioration   Update acute care plan with appropriate goals if chronic or comorbid symptoms are exacerbated and prevent overall improvement and discharge     Problem: Nutrition  Goal: Nutrient intake appropriate for maintaining nutritional needs  Outcome: Progressing     Problem: Skin  Goal: Decreased wound size/increased tissue granulation at next dressing change  Outcome: Progressing  Flowsheets (Taken 7/19/2025 1335)  Decreased wound size/increased tissue granulation at next dressing change: Protective dressings over bony prominences  Goal: Participates in plan/prevention/treatment measures  Outcome: Progressing  Flowsheets (Taken 7/19/2025 1335)  Participates in plan/prevention/treatment measures: Elevate heels  Goal: Prevent/manage excess moisture  Outcome: Progressing  Flowsheets (Taken 7/19/2025 1335)  Prevent/manage excess moisture: Moisturize dry skin  Goal: Prevent/minimize sheer/friction injuries  Outcome: Progressing  Flowsheets (Taken 7/19/2025 1335)  Prevent/minimize sheer/friction injuries: HOB 30 degrees or less  Goal: Promote/optimize nutrition  Outcome: Progressing  Flowsheets (Taken 7/19/2025 1335)  Promote/optimize nutrition: Monitor/record intake including meals  Goal: Promote skin healing  Outcome: Progressing  Flowsheets (Taken 7/19/2025 1335)  Promote skin healing: Protective dressings over bony prominences

## 2025-07-19 NOTE — PROGRESS NOTES
07/19/25 1600   Discharge Planning   Assistance Needed SNF     Attempted to reach spouse regarding snf recommendation unable to reach as vm not set up. CM to try again tomorrow.     Able to reach spouse on another line referral to [facility]. A snf list was emailed to her as well.

## 2025-07-19 NOTE — PROGRESS NOTES
"Physical Therapy    Physical Therapy Evaluation & Treatment    Patient Name: Mauro Grande  MRN: [id number]  Department: [place] [id number]  Room: [address] [place] [address]  Today's Date: 7/19/2025   Time Calculation  Start Time: 1328  Stop Time: 1407  Time Calculation (min): 39 min    Assessment/Plan   Assessment Comment: pt demonstrates with generalized weakness, presenting with decline in strength and balance, impacting mobility and prior level of function; pt reports he is not at baseline, and feels \"off\" and \"more weak than usual\". Pt required MinAx1 for majority of the mobility assessed with PT, noting some balance deficits and decreased strength. Pt would benefit from ongoing PT at this facility to continue efforts in improving the above deficits; anticipate MOD intensity PT at [facility] to continue efforts in functional independence and improve mobility to return to OF. Pt has high likelihood of progressing well with skilled services d/t motivation and spouse support. Potential to progress to Low Intensity     PT Assessment Results  Decreased strength, Decreased endurance, Impaired balance, Decreased mobility  Rehab Prognosis: Good  Barriers to Discharge Home: Physical needs  Physical Needs: Intermittent mobility assistance needed, Intermittent ADL assistance needed, High falls risk due to function or environment  Evaluation/Treatment Tolerance: Patient tolerated treatment well  Medical Staff Made Aware: Yes  Strengths: Ability to acquire knowledge, Access to adaptive/assistive products, Attitude of self, Capable of completing ADLs semi/independent, Housing layout, Insight into problems, Support and attitude of living partners  Barriers to Participation: Comorbidities  End of Session Communication: Bedside nurse  End of Session Patient Position: Up in chair, Alarm on (eating lunch)    PT Plan  IP OR SWING BED PT PLAN  Inpatient or Swing Bed: Inpatient  Treatment/Interventions: Bed mobility, Transfer training, Gait training, Stair " training, Balance training, Neuromuscular re-education, Strengthening, Endurance training, Range of motion, Therapeutic exercise, Therapeutic activity, Home exercise program, Postural re-education  PT Plan: Ongoing PT  PT Frequency: 3 times per week (during this acute rehabilitation hospitalization)  PT Discharge Recommendations: Moderate intensity level of continued care (Based on current functional status and rehab potential, patient is anticipated to tolerate and benefit from 5 or more days per week of skilled rehab therapy after DC from this acute inpatient hospitalization.)  Equipment Recommended upon Discharge:  (script for rollator?)  PT Recommended Transfer Status: Assist x1, Assistive device  PT - OK to Discharge: Yes (per PT POC)    Subjective   PT Visit Info:  PT Received On: 07/19/25    General Visit Information:  Reason for Referral: ED with c/o generalized weakness after fall in bathroom at home, unable to get up from floor  Referred By: Ronda Sanches MD  Past Medical History Relevant to Rehab: Medical History[1]  Family/Caregiver Present: No  Prior to Session Communication: Bedside nurse  Patient Position Received: Bed, 3 rail up, Alarm on  Preferred Learning Style: verbal, visual  General Comment: pt pleasant and willing to work with PT    Home Living:  Home Living  Type of Home: House  Lives With: Spouse  Home Adaptive Equipment: Walker rolling or standard, Cane  Home Layout: One level, Laundry main level  Home Access: Stairs to enter with rails  Entrance Stairs-Rails: Left  Entrance Stairs-Number of Steps: 1+3  Bathroom Shower/Tub: Walk-in shower (8 in threshold to step over)  Bathroom Toilet: Handicapped height  Bathroom Equipment: Grab bars in shower, Shower chair with back  Home Living Comments: pt fell while stepping out of shower onto rug, rug slipped underneath him    Prior Level of Function:  Prior Function Per Pt/Caregiver Report  Level of [place]: Independent with ADLs and  "functional transfers, Independent with homemaking with ambulation  Receives Help From: Family (wife)  ADL Assistance: Independent  Homemaking Assistance: Independent (split chores, wife does laundry, both cook)  Ambulatory Assistance: Independent (via FWW)  Vocational: Retired  Leisure: watching jeopardy and \"looking at eachother (wife)\"  Hand Dominance: Right  Prior Function Comments: (+) fall, reason for admin; (+) driving    Precautions:  Precautions  Hearing/Visual Limitations: glasses (+); hearing aides (+)  Medical Precautions: Fall precautions  Precautions Comment: tele    Objective   Pain:  Pain Assessment  Pain Assessment: 0-10  0-10 (Numeric) Pain Score: 0 - No pain    Cognition:  Cognition  Overall Cognitive Status: Within Functional Limits  Orientation Level: Oriented X4  Following Commands: Follows all commands and directions without difficulty  Attention: Within Functional Limits  Memory: Within Funtional Limits  Problem Solving: Within Functional Limits  Numeric Reasoning: Within Functional Limits  Abstract Reasoning: Within Functional Limits  Safety/Judgement: Within Functional Limits  Insight: Within function limits  Impulsive: Within functional limits  Processing Speed: Within funtional limits    General Assessments:  Activity Tolerance  Endurance: Tolerates 10 - 20 min exercise with multiple rests    Sensation  Light Touch: No apparent deficits    Strength  Strength Comments: 3+/5 grossly  Coordination  Movements are Fluid and Coordinated: Yes    Postural Control  Postural Control: Within Functional Limits    Static Sitting Balance  Static Sitting-Balance Support: No upper extremity supported, Feet supported  Static Sitting-Level of Assistance: Distant supervision  Dynamic Sitting Balance  Dynamic Sitting-Balance Support: No upper extremity supported, Feet supported  Dynamic Sitting-Level of Assistance: Close supervision  Dynamic Sitting-Comments: don/doff socks    Static Standing Balance  Static " "Standing-Balance Support: Bilateral upper extremity supported (FWW)  Static Standing-Level of Assistance: Close supervision  Dynamic Standing Balance  Dynamic Standing-Balance Support: Bilateral upper extremity supported (FWW)  Dynamic Standing-Level of Assistance: Contact guard  Dynamic Standing-Comments: pt was using urinal, slight LOB requiring CGA    Functional Assessments:  Bed Mobility  Bed Mobility: Yes  Bed Mobility 1  Bed Mobility 1: Supine to sitting  Level of Assistance 1: Minimum assistance  Bed Mobility Comments 1: HOB slightly elevated, use of hand rail; increased effort demonstrated    Transfers  Transfer: Yes  Transfer 1  Technique 1: Sit to stand, Stand to sit  Transfer Device 1: Walker, Gait belt  Transfer Level of Assistance 1: Minimum assistance  Trials/Comments 1: unable to descend without PT assistance to slowly lower to EOB; able to ascend but increased time and effort required to perform with inability to fully stand upright without grabbing onto walker and hand walk up    Ambulation/Gait Training  Ambulation/Gait Training Performed: Yes  Ambulation/Gait Training 1  Surface 1: Level tile  Device 1: Rolling walker  Gait Support Devices: Gait belt  Assistance 1: Close supervision  Quality of Gait 1: Wide base of support, Diminished heel strike, Decreased step length  Comments/Distance (ft) 1: amb ~30ft within room, SBA; no LOB with FWW, pt reports he feels more \"off\" than normal and more weak due to being bed bound for a few days    Stairs  Stairs: Yes  Stairs  Curb Step 1: Yes  Device 1: Wheeled walker  Support Devices 1: Gait belt  Assistance 1: Contact guard  Comment/Number of Steps 1: pt able to ascend and descend with increased effort and increased time; CGAx1 provided for safety    Extremity/Trunk Assessments:  RUE   RUE : Within Functional Limits  LUE   LUE: Within Functional Limits  RLE   RLE : Within Functional Limits  LLE   LLE : Within Functional Limits    Treatments:  Therapeutic " Activity  Therapeutic Activity Performed: Yes  Therapeutic Activity 1: edu on the importance of skilled intervention and the techniques shared to reduce risk of falls and increase safety awareness while performing mobility tasks    Outcome Measures:  [facility] Basic Mobility  Turning from your back to your side while in a flat bed without using bedrails: A little  Moving from lying on your back to sitting on the side of a flat bed without using bedrails: A little  Moving to and from bed to chair (including a wheelchair): A little  Standing up from a chair using your arms (e.g. wheelchair or bedside chair): A lot  To walk in hospital room: A little  Climbing 3-5 steps with railing: A lot  Basic Mobility - Total Score: 16    Encounter Problems       Encounter Problems (Active)       Balance       STG - Maintains dynamic standing balance without upper extremity support IND for >5 minutes while performing ADLs       Start:  07/19/25    Expected End:  08/02/25               Mobility       LTG - Patient will navigate 4 steps with rails/device or LRAD and SBA       Start:  07/19/25    Expected End:  08/02/25            STG - Patient will ambulate ~200ft with LRAD and SBA       Start:  07/19/25    Expected End:  08/02/25               PT Transfers       STG - Patient will perform bed mobility IND       Start:  07/19/25    Expected End:  08/02/25            STG - Patient will transfer sit to and from stand with LRAD and SBA       Start:  07/19/25    Expected End:  08/02/25                   Education Documentation  Precautions, taught by Dulce Maria Herrera PT at 7/19/2025  2:14 PM.  Learner: Patient  Readiness: Acceptance  Method: Explanation  Response: Verbalizes Understanding    Body Mechanics, taught by Dulce Maria Herrera PT at 7/19/2025  2:14 PM.  Learner: Patient  Readiness: Acceptance  Method: Explanation  Response: Verbalizes Understanding    Mobility Training, taught by Dulce Maria Herrera PT at 7/19/2025  2:14 PM.  Learner:  Patient  Readiness: Acceptance  Method: Explanation  Response: Verbalizes Understanding    Education Comments  No comments found.           [1]   Past Medical History:  Diagnosis Date    Diabetes mellitus (Multi)     Hyperlipemia     Hypertension

## 2025-07-19 NOTE — CARE PLAN
The patient's goals for the shift include To get rest and medicine    The clinical goals for the shift include pt will remain HDS, safe and free from falls throughout shift      Problem: Pain - Adult  Goal: Verbalizes/displays adequate comfort level or baseline comfort level  Outcome: Progressing     Problem: Safety - Adult  Goal: Free from fall injury  Outcome: Progressing     Problem: Chronic Conditions and Co-morbidities  Goal: Patient's chronic conditions and co-morbidity symptoms are monitored and maintained or improved  Outcome: Progressing

## 2025-07-20 ENCOUNTER — APPOINTMENT (OUTPATIENT)
Dept: RADIOLOGY | Facility: HOSPITAL | Age: 85
End: 2025-07-20
Payer: MEDICARE

## 2025-07-20 VITALS
WEIGHT: 212.96 LBS | TEMPERATURE: 97.3 F | HEIGHT: 69 IN | OXYGEN SATURATION: 95 % | HEART RATE: 81 BPM | RESPIRATION RATE: 17 BRPM | DIASTOLIC BLOOD PRESSURE: 83 MMHG | BODY MASS INDEX: 31.54 KG/M2 | SYSTOLIC BLOOD PRESSURE: 157 MMHG

## 2025-07-20 LAB
ALBUMIN SERPL BCP-MCNC: 3.2 G/DL (ref 3.4–5)
ALP SERPL-CCNC: 64 U/L (ref 33–136)
ALT SERPL W P-5'-P-CCNC: 21 U/L (ref 10–52)
ANION GAP SERPL CALC-SCNC: 14 MMOL/L (ref 10–20)
AST SERPL W P-5'-P-CCNC: 26 U/L (ref 9–39)
BACTERIA BLD CULT: NORMAL
BACTERIA BLD CULT: NORMAL
BACTERIA UR CULT: ABNORMAL
BASOPHILS # BLD AUTO: 0.07 X10*3/UL (ref 0–0.1)
BASOPHILS NFR BLD AUTO: 0.5 %
BILIRUB DIRECT SERPL-MCNC: 0.2 MG/DL (ref 0–0.3)
BILIRUB SERPL-MCNC: 1.2 MG/DL (ref 0–1.2)
BNP SERPL-MCNC: 269 PG/ML (ref 0–99)
BUN SERPL-MCNC: 24 MG/DL (ref 6–23)
CALCIUM SERPL-MCNC: 8.8 MG/DL (ref 8.6–10.3)
CHLORIDE SERPL-SCNC: 103 MMOL/L (ref 98–107)
CO2 SERPL-SCNC: 26 MMOL/L (ref 21–32)
CREAT SERPL-MCNC: 2.01 MG/DL (ref 0.5–1.3)
EGFRCR SERPLBLD CKD-EPI 2021: 32 ML/MIN/1.73M*2
EOSINOPHIL # BLD AUTO: 0.34 X10*3/UL (ref 0–0.4)
EOSINOPHIL NFR BLD AUTO: 2.4 %
ERYTHROCYTE [DISTWIDTH] IN BLOOD BY AUTOMATED COUNT: 13.8 % (ref 11.5–14.5)
GLUCOSE BLD MANUAL STRIP-MCNC: 139 MG/DL (ref 74–99)
GLUCOSE BLD MANUAL STRIP-MCNC: 248 MG/DL (ref 74–99)
GLUCOSE BLD MANUAL STRIP-MCNC: 256 MG/DL (ref 74–99)
GLUCOSE BLD MANUAL STRIP-MCNC: 262 MG/DL (ref 74–99)
GLUCOSE SERPL-MCNC: 132 MG/DL (ref 74–99)
HCT VFR BLD AUTO: 40.2 % (ref 41–52)
HGB BLD-MCNC: 13.1 G/DL (ref 13.5–17.5)
IMM GRANULOCYTES # BLD AUTO: 0.09 X10*3/UL (ref 0–0.5)
IMM GRANULOCYTES NFR BLD AUTO: 0.6 % (ref 0–0.9)
LYMPHOCYTES # BLD AUTO: 4.65 X10*3/UL (ref 0.8–3)
LYMPHOCYTES NFR BLD AUTO: 32.4 %
MAGNESIUM SERPL-MCNC: 1.51 MG/DL (ref 1.6–2.4)
MCH RBC QN AUTO: 30.3 PG (ref 26–34)
MCHC RBC AUTO-ENTMCNC: 32.6 G/DL (ref 32–36)
MCV RBC AUTO: 93 FL (ref 80–100)
MONOCYTES # BLD AUTO: 0.89 X10*3/UL (ref 0.05–0.8)
MONOCYTES NFR BLD AUTO: 6.2 %
NEUTROPHILS # BLD AUTO: 8.3 X10*3/UL (ref 1.6–5.5)
NEUTROPHILS NFR BLD AUTO: 57.9 %
NRBC BLD-RTO: 0 /100 WBCS (ref 0–0)
PLATELET # BLD AUTO: 159 X10*3/UL (ref 150–450)
POTASSIUM SERPL-SCNC: 3.5 MMOL/L (ref 3.5–5.3)
PROT SERPL-MCNC: 6.1 G/DL (ref 6.4–8.2)
RBC # BLD AUTO: 4.32 X10*6/UL (ref 4.5–5.9)
SODIUM SERPL-SCNC: 139 MMOL/L (ref 136–145)
WBC # BLD AUTO: 14.3 X10*3/UL (ref 4.4–11.3)

## 2025-07-20 PROCEDURE — 84075 ASSAY ALKALINE PHOSPHATASE: CPT | Performed by: NURSE PRACTITIONER

## 2025-07-20 PROCEDURE — 80048 BASIC METABOLIC PNL TOTAL CA: CPT | Performed by: NURSE PRACTITIONER

## 2025-07-20 PROCEDURE — 2500000004 HC RX 250 GENERAL PHARMACY W/ HCPCS (ALT 636 FOR OP/ED): Performed by: NURSE PRACTITIONER

## 2025-07-20 PROCEDURE — 36415 COLL VENOUS BLD VENIPUNCTURE: CPT | Performed by: NURSE PRACTITIONER

## 2025-07-20 PROCEDURE — 97165 OT EVAL LOW COMPLEX 30 MIN: CPT | Mod: GO

## 2025-07-20 PROCEDURE — 83880 ASSAY OF NATRIURETIC PEPTIDE: CPT | Performed by: NURSE PRACTITIONER

## 2025-07-20 PROCEDURE — 2500000001 HC RX 250 WO HCPCS SELF ADMINISTERED DRUGS (ALT 637 FOR MEDICARE OP): Performed by: NURSE PRACTITIONER

## 2025-07-20 PROCEDURE — 2500000005 HC RX 250 GENERAL PHARMACY W/O HCPCS: Performed by: NURSE PRACTITIONER

## 2025-07-20 PROCEDURE — 2500000001 HC RX 250 WO HCPCS SELF ADMINISTERED DRUGS (ALT 637 FOR MEDICARE OP): Performed by: STUDENT IN AN ORGANIZED HEALTH CARE EDUCATION/TRAINING PROGRAM

## 2025-07-20 PROCEDURE — 73564 X-RAY EXAM KNEE 4 OR MORE: CPT | Mod: RT

## 2025-07-20 PROCEDURE — 1200000002 HC GENERAL ROOM WITH TELEMETRY DAILY

## 2025-07-20 PROCEDURE — 83735 ASSAY OF MAGNESIUM: CPT | Performed by: NURSE PRACTITIONER

## 2025-07-20 PROCEDURE — 99232 SBSQ HOSP IP/OBS MODERATE 35: CPT | Performed by: NURSE PRACTITIONER

## 2025-07-20 PROCEDURE — 2500000002 HC RX 250 W HCPCS SELF ADMINISTERED DRUGS (ALT 637 FOR MEDICARE OP, ALT 636 FOR OP/ED): Performed by: NURSE PRACTITIONER

## 2025-07-20 PROCEDURE — 85025 COMPLETE CBC W/AUTO DIFF WBC: CPT | Performed by: NURSE PRACTITIONER

## 2025-07-20 PROCEDURE — 82947 ASSAY GLUCOSE BLOOD QUANT: CPT

## 2025-07-20 PROCEDURE — 87899 AGENT NOS ASSAY W/OPTIC: CPT | Mod: AHULAB | Performed by: NURSE PRACTITIONER

## 2025-07-20 PROCEDURE — 73564 X-RAY EXAM KNEE 4 OR MORE: CPT | Mod: RIGHT SIDE | Performed by: RADIOLOGY

## 2025-07-20 PROCEDURE — 2500000001 HC RX 250 WO HCPCS SELF ADMINISTERED DRUGS (ALT 637 FOR MEDICARE OP): Performed by: HOSPITALIST

## 2025-07-20 PROCEDURE — 97535 SELF CARE MNGMENT TRAINING: CPT | Mod: GO

## 2025-07-20 RX ORDER — MAGNESIUM SULFATE HEPTAHYDRATE 40 MG/ML
2 INJECTION, SOLUTION INTRAVENOUS ONCE
Status: COMPLETED | OUTPATIENT
Start: 2025-07-20 | End: 2025-07-20

## 2025-07-20 RX ORDER — POTASSIUM CHLORIDE 1.5 G/1.58G
20 POWDER, FOR SOLUTION ORAL ONCE
Status: COMPLETED | OUTPATIENT
Start: 2025-07-20 | End: 2025-07-20

## 2025-07-20 RX ORDER — AMOXICILLIN AND CLAVULANATE POTASSIUM 500; 125 MG/1; MG/1
1 TABLET, FILM COATED ORAL 2 TIMES DAILY
Status: DISPENSED | OUTPATIENT
Start: 2025-07-20 | End: 2025-07-24

## 2025-07-20 RX ADMIN — APIXABAN 2.5 MG: 2.5 TABLET, FILM COATED ORAL at 08:34

## 2025-07-20 RX ADMIN — ATORVASTATIN CALCIUM 40 MG: 40 TABLET, FILM COATED ORAL at 21:40

## 2025-07-20 RX ADMIN — INSULIN LISPRO 6 UNITS: 100 INJECTION, SOLUTION INTRAVENOUS; SUBCUTANEOUS at 13:01

## 2025-07-20 RX ADMIN — Medication 50 MCG: at 08:32

## 2025-07-20 RX ADMIN — POTASSIUM CHLORIDE 20 MEQ: 1.5 POWDER, FOR SOLUTION ORAL at 08:44

## 2025-07-20 RX ADMIN — LATANOPROST 1 DROP: 50 SOLUTION/ DROPS OPHTHALMIC at 21:52

## 2025-07-20 RX ADMIN — AMOXICILLIN AND CLAVULANATE POTASSIUM 1 TABLET: 500; 125 TABLET, FILM COATED ORAL at 15:26

## 2025-07-20 RX ADMIN — HYDROCHLOROTHIAZIDE 25 MG: 25 TABLET ORAL at 08:34

## 2025-07-20 RX ADMIN — METOPROLOL SUCCINATE 25 MG: 25 TABLET, EXTENDED RELEASE ORAL at 08:34

## 2025-07-20 RX ADMIN — MAGNESIUM SULFATE HEPTAHYDRATE 2 G: 40 INJECTION, SOLUTION INTRAVENOUS at 09:42

## 2025-07-20 RX ADMIN — INSULIN LISPRO 4 UNITS: 100 INJECTION, SOLUTION INTRAVENOUS; SUBCUTANEOUS at 16:54

## 2025-07-20 RX ADMIN — LIDOCAINE 4% 1 PATCH: 40 PATCH TOPICAL at 08:31

## 2025-07-20 RX ADMIN — PIPERACILLIN SODIUM AND TAZOBACTAM SODIUM 2.25 G: 2; .25 INJECTION, SOLUTION INTRAVENOUS at 03:46

## 2025-07-20 RX ADMIN — APIXABAN 2.5 MG: 2.5 TABLET, FILM COATED ORAL at 21:40

## 2025-07-20 RX ADMIN — GLIPIZIDE 5 MG: 2.5 TABLET, EXTENDED RELEASE ORAL at 06:51

## 2025-07-20 RX ADMIN — LOSARTAN POTASSIUM 12.5 MG: 25 TABLET, FILM COATED ORAL at 08:33

## 2025-07-20 RX ADMIN — GLIPIZIDE 5 MG: 2.5 TABLET, EXTENDED RELEASE ORAL at 15:26

## 2025-07-20 RX ADMIN — PANTOPRAZOLE SODIUM 40 MG: 40 TABLET, DELAYED RELEASE ORAL at 06:51

## 2025-07-20 ASSESSMENT — COGNITIVE AND FUNCTIONAL STATUS - GENERAL
MOVING TO AND FROM BED TO CHAIR: A LITTLE
DAILY ACTIVITIY SCORE: 17
MOBILITY SCORE: 18
TOILETING: A LITTLE
CLIMB 3 TO 5 STEPS WITH RAILING: A LOT
DRESSING REGULAR UPPER BODY CLOTHING: A LITTLE
TURNING FROM BACK TO SIDE WHILE IN FLAT BAD: A LITTLE
HELP NEEDED FOR BATHING: A LOT
WALKING IN HOSPITAL ROOM: A LITTLE
DAILY ACTIVITIY SCORE: 24
STANDING UP FROM CHAIR USING ARMS: A LITTLE
PERSONAL GROOMING: A LITTLE
DRESSING REGULAR LOWER BODY CLOTHING: A LOT

## 2025-07-20 ASSESSMENT — PAIN SCALES - GENERAL
PAINLEVEL_OUTOF10: 0 - NO PAIN

## 2025-07-20 ASSESSMENT — PAIN - FUNCTIONAL ASSESSMENT
PAIN_FUNCTIONAL_ASSESSMENT: 0-10

## 2025-07-20 ASSESSMENT — ACTIVITIES OF DAILY LIVING (ADL)
HOME_MANAGEMENT_TIME_ENTRY: 20
ADL_ASSISTANCE: INDEPENDENT

## 2025-07-20 NOTE — CARE PLAN
The patient's goals for the shift include To get rest and medicine    The clinical goals for the shift include Pt will remain free of injury this safe throughout this shift    Over the shift, the patient did make progress toward the following goals.  Problem: Pain - Adult  Goal: Verbalizes/displays adequate comfort level or baseline comfort level  Outcome: Progressing     Problem: Safety - Adult  Goal: Free from fall injury  Outcome: Progressing     Problem: Discharge Planning  Goal: Discharge to home or other facility with appropriate resources  Outcome: Progressing     Problem: Chronic Conditions and Co-morbidities  Goal: Patient's chronic conditions and co-morbidity symptoms are monitored and maintained or improved  Outcome: Progressing     Problem: Nutrition  Goal: Nutrient intake appropriate for maintaining nutritional needs  Outcome: Progressing     Problem: Skin  Goal: Decreased wound size/increased tissue granulation at next dressing change  Outcome: Progressing  Goal: Participates in plan/prevention/treatment measures  Outcome: Progressing  Goal: Prevent/manage excess moisture  Outcome: Progressing  Goal: Prevent/minimize sheer/friction injuries  Outcome: Progressing  Goal: Promote/optimize nutrition  Outcome: Progressing  Goal: Promote skin healing  Outcome: Progressing

## 2025-07-20 NOTE — PROGRESS NOTES
Occupational Therapy    Evaluation/Treatment    Patient Name: Mauro Grande  MRN: [id number]  Department: [facility]  Room: [address] [place] [address]  Today's Date: 07/20/25  Time Calculation  Start Time: 0924  Stop Time: 0959  Time Calculation (min): 35 min       Assessment:  OT Assessment: Pt requiring assist for safety awareness during functional ambulation. Pt demonstrates decreased endurance following tasks requiring seated rest breaks. Pt educated in fall prevention strategies and environmental modifications.  Prognosis: Good  Barriers to Discharge Home: Caregiver assistance  Caregiver Assistance: Caregiver assistance needed per identified barriers - however, level of patient's required assistance exceeds assistance available at home  Evaluation/Treatment Tolerance: Patient tolerated treatment well, Patient limited by fatigue  Medical Staff Made Aware: Yes  End of Session Communication: Bedside nurse  End of Session Patient Position: Up in chair, Alarm on  OT Assessment Results: Decreased ADL status, Decreased upper extremity strength, Decreased safe judgment during ADL, Decreased endurance, Decreased functional mobility, Decreased IADLs  Prognosis: Good  Barriers to Discharge: None  Evaluation/Treatment Tolerance: Patient tolerated treatment well, Patient limited by fatigue  Medical Staff Made Aware: Yes  Strengths: Ability to acquire knowledge, Access to adaptive/assistive products, Attitude of self  Barriers to Participation: Capable of completing ADLs semi/independent, Comorbidities  Plan:  Treatment Interventions: ADL retraining, Functional transfer training, UE strengthening/ROM, Endurance training, Equipment evaluation/education, Fine motor coordination activities, Compensatory technique education  OT Frequency: 3 times per week  OT Discharge Recommendations: Moderate intensity level of continued care  Equipment Recommended upon Discharge: Wheeled walker  OT Recommended Transfer Status: Assist of 1  OT - OK to Discharge:  Yes  Treatment Interventions: ADL retraining, Functional transfer training, UE strengthening/ROM, Endurance training, Equipment evaluation/education, Fine motor coordination activities, Compensatory technique education    Subjective   Current Problem:  1. Sepsis, due to unspecified organism, unspecified whether acute organ dysfunction present (Multi)        2. Urinary tract infection without hematuria, site unspecified        3. S/P CABG x 2  Transthoracic Echo Complete    Transthoracic Echo Complete      4. CAD in native artery  Transthoracic Echo Complete    Transthoracic Echo Complete      5. Dyspnea, unspecified type  Transthoracic Echo Complete    Transthoracic Echo Complete        OT Visit Info:  OT Received On: 07/20/25  General Visit Info:   General  Reason for Referral: ED with c/o generalized weakness after fall in bathroom at home, unable to get up from floor  Referred By: Ronda Sanches MD  Past Medical History Relevant to Rehab: Medical History[1]    Prior to Session Communication: Bedside nurse  Patient Position Received: Bed, 3 rail up, Alarm on  Preferred Learning Style: verbal, visual  General Comment: pt agreeable to OT, requesting to use bathroom   Precautions:  Hearing/Visual Limitations: glasses (+); hearing aides (+)  Medical Precautions: Fall precautions    Pain:  Pain Assessment  Pain Assessment: 0-10  0-10 (Numeric) Pain Score: 0 - No pain    Objective   Cognition:  Overall Cognitive Status: Within Functional Limits  Orientation Level: Oriented X4  Following Commands: Follows all commands and directions without difficulty  Attention: Within Functional Limits  Memory: Within Funtional Limits  Problem Solving: Within Functional Limits    Home Living:  Type of Home: House  Lives With: Spouse  Home Adaptive Equipment: Walker rolling or standard, Cane  Home Layout: One level, Laundry main level  Home Access: Stairs to enter with rails  Entrance Stairs-Rails: Left  Entrance Stairs-Number of  Steps: 1+3  Bathroom Shower/Tub: Walk-in shower (8 in threshold to step over)  Bathroom Toilet: Handicapped height  Bathroom Equipment: Grab bars in shower, Shower chair with back  Prior Function:  Level of [place]: Independent with ADLs and functional transfers, Independent with homemaking with ambulation  Receives Help From: Family (wife (sons live in area))  ADL Assistance: Independent  Homemaking Assistance: Independent (split chores, wife does laundry, both cook)  Ambulatory Assistance: Independent (FWW)  Vocational: Retired (CPA)  Leisure: watching Film[employer]opardy  Hand Dominance: Right  Prior Function Comments: (+) fall, reason for admin; (+) driving; pt fell while stepping out of shower onto rug, rug slipped underneath him    Activities of Daily Living: Grooming  Grooming Level of Assistance: Setup  Grooming Where Assessed: Chair    UE Dressing  UE Dressing Level of Assistance: Minimum assistance  UE Dressing Where Assessed: Chair  UE Dressing Comments: assist to thread gown over liz shoulders    Toileting  Toileting Level of Assistance: Close supervision  Where Assessed: Toilet  Toileting Comments: SUP for tasks  Activity Tolerance:  Endurance: Tolerates 30 min exercise with multiple rests  Functional Standing Tolerance:     Bed Mobility/Transfers: Bed Mobility  Bed Mobility: Yes  Bed Mobility 1  Bed Mobility 1: Supine to sitting  Level of Assistance 1: Contact guard  Bed Mobility Comments 1: CGA at trunk    Transfers  Transfer: Yes  Transfer 1  Transfer From 1: Bed to  Transfer to 1: Stand  Transfer Device 1: Walker, Gait belt  Transfer Level of Assistance 1: Moderate assistance  Trials/Comments 1: cues for hand placement, mod A due to weakness  Transfers 2  Technique 2: Sit to stand, Stand to sit (toilet)  Transfer Device 2: Walker, Gait belt  Transfer Level of Assistance 2: Minimum assistance, Moderate verbal cues  Trials/Comments 2: cues for use of grab bar, min A to complete transfers    Functional  Mobility:  Functional Mobility  Functional Mobility Performed: Yes  Functional Mobility 1  Surface 1: Level tile  Device 1: Rolling walker  Functional Mobility Support Devices: Gait belt  Assistance 1: Contact guard  Comments 1: pt completed min HH distance bed>toilet; pt completed second trial following ADLs tasks in bathroom ambulating around the room into the hallway mod HH distance, pt limited by fatigue, cues for FWW placement for stability  Sitting Balance:  Static Sitting Balance  Static Sitting-Balance Support: Feet supported  Static Sitting-Level of Assistance: Distant supervision  Standing Balance:  Static Standing Balance  Static Standing-Balance Support: Bilateral upper extremity supported  Static Standing-Level of Assistance: Close supervision  Dynamic Standing Balance  Dynamic Standing-Balance Support: Bilateral upper extremity supported  Dynamic Standing-Level of Assistance: Contact guard (cues for safety and FWW placement close to body)     Vision:Vision - Basic Assessment  Current Vision: No visual deficits  Sensation:  Light Touch: No apparent deficits  Strength:  Strength Comments: 3+/5 grossly     Perception:  Inattention/Neglect: Appears intact  Coordination:  Movements are Fluid and Coordinated: Yes   Hand Function:  Hand Function  Gross Grasp: Functional  Coordination: Functional  Extremities: RUE   RUE : Within Functional Limits and LUE   LUE: Within Functional Limits    Outcome Measures: [facility] Daily Activity  Putting on and taking off regular lower body clothing: A lot  Bathing (including washing, rinsing, drying): A lot  Putting on and taking off regular upper body clothing: A little  Toileting, which includes using toilet, bedpan or urinal: A little  Taking care of personal grooming such as brushing teeth: A little  Eating Meals: None  Daily Activity - Total Score: 17      Education Documentation  Body Mechanics, taught by Liana Bolanos OT at 7/20/2025 11:33 AM.  Learner:  Patient  Readiness: Acceptance  Method: Explanation, Demonstration  Response: Verbalizes Understanding, Demonstrated Understanding, Needs Reinforcement    Home Exercise Program, taught by Liana Bolanos OT at 7/20/2025 11:33 AM.  Learner: Patient  Readiness: Acceptance  Method: Explanation, Demonstration  Response: Verbalizes Understanding, Demonstrated Understanding, Needs Reinforcement    ADL Training, taught by Liana Bolanos OT at 7/20/2025 11:33 AM.  Learner: Patient  Readiness: Acceptance  Method: Explanation, Demonstration  Response: Verbalizes Understanding, Demonstrated Understanding, Needs Reinforcement    Education Comments  No comments found.        OP EDUCATION:       Goals:  Encounter Problems       Encounter Problems (Active)       ADLs       Patient will perform UB and LB bathing with modified independent level of assistance.       Start:  07/20/25    Expected End:  08/03/25            Patient with complete upper body dressing with modified independent level of assistance donning and doffing all UE clothes.       Start:  07/20/25    Expected End:  08/03/25            Patient with complete lower body dressing with modified independent level of assistance donning and doffing all LE clothes.       Start:  07/20/25    Expected End:  08/03/25            Patient will complete daily grooming tasks brushing teeth with modified independent level of assistance and PRN adaptive equipment while standing.       Start:  07/20/25    Expected End:  08/03/25            Patient will complete toileting including hygiene clothing management/hygiene with modified independent level of assistance.       Start:  07/20/25    Expected End:  08/03/25               MOBILITY       Patient will perform Functional mobility max Household distances/Community Distances with stand by assist level of assistance and front wheeled walker in order to improve safety and functional mobility.       Start:  07/20/25    Expected End:  08/03/25                TRANSFERS       Patient will complete sit to stand transfer with stand by assist level of assistance and front wheeled walker in order to improve safety and prepare for out of bed mobility.       Start:  07/20/25    Expected End:  08/03/25                      [1]   Past Medical History:  Diagnosis Date    Diabetes mellitus (Multi)     Hyperlipemia     Hypertension

## 2025-07-20 NOTE — CONSULTS
Inpatient consult to Infectious Diseases  Consult performed by: Ida Cain DO  Consult ordered by: Amalia Fall, APRN-CNP          Referred by Dr Olivera    Primary MD: Maggie Gutierrez MD    Reason For Consult  sepsis    History Of Present Illness  Mauro Grande is a 85 y.o. male presenting with weakness.    There was concern for UTI, thus he was started on empiric pip tazo. +leukocytosis    Urine and blood cultures were collected, and urine cx is growing klebsiella pneumoniae    He denies any current symptoms. No specific complaints     Past Medical History  He has a past medical history of Diabetes mellitus (Multi), Hyperlipemia, and Hypertension.    Surgical History  He has a past surgical history that includes MR angio head wo IV contrast (10/23/2016); Tonsillectomy; Nose surgery; and Vasectomy.     Social History  He reports that he has never smoked. He has never used smokeless tobacco. He reports current alcohol use of about 2.0 - 6.0 standard drinks of alcohol per week. He reports that he does not use drugs.   Travel Screening       Question Response    Do you have any of the following new or worsening symptoms? None of these    Have you traveled internationally or domestically in the last month? No          Travel History   Travel since 06/20/25    No documented travel since 06/20/25           Family History  Family History[1]  Allergies  Patient has no known allergies.     Immunization History   Administered Date(s) Administered    COVID-19, mRNA, LNP-S, PF, 30 mcg/0.3 mL dose 01/29/2021, 02/19/2021, 12/23/2021    Flu vaccine, trivalent, preservative free, HIGH-DOSE, age 65y+ (Fluzone) 09/23/2024    Moderna COVID-19 vaccine, 12 years and older (50mcg/0.5mL)(Spikevax) 10/17/2023    Pfizer COVID-19 vaccine, bivalent, age 12 years and older (30 mcg/0.3 mL) 10/07/2022     Review of Systems  No fevers, chills, N/V/D, abd pain, SOB, chest pain, headache, sore throat, dysuria. Otherwise ROS is  "negative     Physical Exam  General: AAOx3, NAD, nontoxic appearing  Eyes: PERRL, EOMI, no scleral icterus  ENT: no oral thrush or lesions  CV: RRR, +S1/S2  Resp: lungs CTA b/l, normal resp effort  Abd: soft, nontender, nondistended, +bowel sounds  : no wilkerson  Ext: no edema  MSK: good ROM  Skin: no rashes or wounds     Range of Vitals (last 24 hours)  Heart Rate:  []   Temp:  [36.1 °C (97 °F)-37 °C (98.6 °F)]   BP: (114-161)/(64-89)   SpO2:  [95 %-98 %]     Relevant Results  Lab Results   Component Value Date    WBC 18.3 (H) 07/19/2025    HGB 12.6 (L) 07/19/2025    HCT 37.7 (L) 07/19/2025    MCV 91 07/19/2025     07/19/2025      Results from last 72 hours   Lab Units 07/19/25  0625   SODIUM mmol/L 140   POTASSIUM mmol/L 2.9*   CHLORIDE mmol/L 104   CO2 mmol/L 27   BUN mg/dL 24*   CREATININE mg/dL 2.06*   GLUCOSE mg/dL 67*   CALCIUM mg/dL 8.7   ANION GAP mmol/L 12   EGFR mL/min/1.73m*2 31*     Results from last 72 hours   Lab Units 07/18/25  0919   ALK PHOS U/L 62   BILIRUBIN TOTAL mg/dL 1.3*   BILIRUBIN DIRECT mg/dL 0.2   PROTEIN TOTAL g/dL 6.4   ALT U/L 12   AST U/L 19   ALBUMIN g/dL 3.5     Estimated Creatinine Clearance: 30.6 mL/min (A) (by C-G formula based on SCr of 2.06 mg/dL (H)).  CRP   Date/Time Value Ref Range Status   04/29/2021 12:07 PM 0.54 mg/dL Final     Comment:     REF VALUE  < 1.00       Sedimentation Rate   Date/Time Value Ref Range Status   09/07/2023 12:08 PM 15 0 - 20 mm/h Final   07/14/2022 03:00 PM 15 0 - 20 mm/h Final   06/12/2021 09:48 AM 18 0 - 20 mm/h Final     No results found for: \"HIV1X2\", \"HIVCONF\", \"ITPDQE4AC\"  No results found for: \"HCVPCRQUANT\"    Cultures/Micro  Susceptibility data from last 14 days.  Collected Specimen Info Organism   07/18/25 Urine from Clean Catch/Voided Klebsiella pneumoniae/variicola     Imaging:  CXR  No pneumonia    Assessment:  Leukocytosis in the setting of UTI  Elevated creatinine    Plan/Recommendations:  Based on klebsiella sensitivies, " ok to switch to PO augmentin 500/125 BID through end date 7/24/25  Follow up CBC from today --> WBC 14  Will sign off, please recall if any questions    Ida Cain DO  [facility]  #[phone]           [1]   Family History  Problem Relation Name Age of Onset    Cancer Mother      Other (Heart problems) Father      Other (Heart problems) Mother's Sister      Lung cancer Father's Brother      Alzheimer's disease Father's Brother      Other (Heart problems) Maternal Grandfather

## 2025-07-20 NOTE — PROGRESS NOTES
"Mauro Grande is a 85 y.o. male on day 2 of admission presenting with Sepsis, due to unspecified organism, unspecified whether acute organ dysfunction present (Multi).    Subjective   Admitted after a fall at home  UA consistent with UTI > Klebsiella and sensitivities noted   Urine and blood cultures NTD pending   ID consulted :Based on klebsiella sensitivies, ok to switch to PO augmentin 500/125 BID through end date 7/24/25  Follow up CBC from today --> WBC 14   Low potassium treat   Right knee pain x ray done   Needs PT/OT eval > SNF       Objective     Physical Exam  Constitutional:       Appearance: Normal appearance.   HENT:      Mouth/Throat:      Mouth: Mucous membranes are moist.     Cardiovascular:      Rate and Rhythm: Regular rhythm.   Pulmonary:      Effort: Pulmonary effort is normal.      Breath sounds: Normal breath sounds.   Abdominal:      Palpations: Abdomen is soft.     Musculoskeletal:         General: Normal range of motion.     Skin:     General: Skin is warm and dry.     Neurological:      General: No focal deficit present.      Mental Status: He is alert and oriented to person, place, and time.      Comments: Answers all questions  Aware of events prior to hospitalization        Last Recorded Vitals  Blood pressure 148/75, pulse 91, temperature 36.5 °C (97.7 °F), temperature source Temporal, resp. rate 20, height 1.753 m (5' 9\"), weight 96.6 kg (212 lb 15.4 oz), SpO2 96%.  Intake/Output last 3 Shifts:  I/O last 3 completed shifts:  In: 900 (9.3 mL/kg) [P.O.:600; IV Piggyback:300]  Out: 2975 (30.8 mL/kg) [Urine:2975 (0.9 mL/kg/hr)]  Weight: 96.6 kg     Relevant Results  Results for orders placed or performed during the hospital encounter of 07/18/25 (from the past 24 hours)   POCT GLUCOSE   Result Value Ref Range    POCT Glucose 230 (H) 74 - 99 mg/dL   Basic Metabolic Panel   Result Value Ref Range    Glucose 132 (H) 74 - 99 mg/dL    Sodium 139 136 - 145 mmol/L    Potassium 3.5 3.5 - 5.3 " mmol/L    Chloride 103 98 - 107 mmol/L    Bicarbonate 26 21 - 32 mmol/L    Anion Gap 14 10 - 20 mmol/L    Urea Nitrogen 24 (H) 6 - 23 mg/dL    Creatinine 2.01 (H) 0.50 - 1.30 mg/dL    eGFR 32 (L) >60 mL/min/1.73m*2    Calcium 8.8 8.6 - 10.3 mg/dL   CBC and Auto Differential   Result Value Ref Range    WBC 14.3 (H) 4.4 - 11.3 x10*3/uL    nRBC 0.0 0.0 - 0.0 /100 WBCs    RBC 4.32 (L) 4.50 - 5.90 x10*6/uL    Hemoglobin 13.1 (L) 13.5 - 17.5 g/dL    Hematocrit 40.2 (L) 41.0 - 52.0 %    MCV 93 80 - 100 fL    MCH 30.3 26.0 - 34.0 pg    MCHC 32.6 32.0 - 36.0 g/dL    RDW 13.8 11.5 - 14.5 %    Platelets 159 150 - 450 x10*3/uL    Neutrophils % 57.9 40.0 - 80.0 %    Immature Granulocytes %, Automated 0.6 0.0 - 0.9 %    Lymphocytes % 32.4 13.0 - 44.0 %    Monocytes % 6.2 2.0 - 10.0 %    Eosinophils % 2.4 0.0 - 6.0 %    Basophils % 0.5 0.0 - 2.0 %    Neutrophils Absolute 8.30 (H) 1.60 - 5.50 x10*3/uL    Immature Granulocytes Absolute, Automated 0.09 0.00 - 0.50 x10*3/uL    Lymphocytes Absolute 4.65 (H) 0.80 - 3.00 x10*3/uL    Monocytes Absolute 0.89 (H) 0.05 - 0.80 x10*3/uL    Eosinophils Absolute 0.34 0.00 - 0.40 x10*3/uL    Basophils Absolute 0.07 0.00 - 0.10 x10*3/uL   Magnesium   Result Value Ref Range    Magnesium 1.51 (L) 1.60 - 2.40 mg/dL   Hepatic Function Panel   Result Value Ref Range    Albumin 3.2 (L) 3.4 - 5.0 g/dL    Bilirubin, Total 1.2 0.0 - 1.2 mg/dL    Bilirubin, Direct 0.2 0.0 - 0.3 mg/dL    Alkaline Phosphatase 64 33 - 136 U/L    ALT 21 10 - 52 U/L    AST 26 9 - 39 U/L    Total Protein 6.1 (L) 6.4 - 8.2 g/dL   B-Type Natriuretic Peptide   Result Value Ref Range     (H) 0 - 99 pg/mL   POCT GLUCOSE   Result Value Ref Range    POCT Glucose 139 (H) 74 - 99 mg/dL   POCT GLUCOSE   Result Value Ref Range    POCT Glucose 262 (H) 74 - 99 mg/dL      Transthoracic Echo Complete   Final Result      XR chest 1 view   Final Result   1. No discrete consolidation.             I personally reviewed the images/study, and  I agree with the findings   as stated above by resident physician Dr. Josefina Patton MD. This   study was interpreted at [facility], [place], [place].        MACRO:   None        Signed by: Fabi Paula 7/18/2025 12:35 PM   Dictation workstation:   [id number]      CT head wo IV contrast   Final Result   1. No acute intracranial abnormality identified.   2. Chronic white matter changes, likely sequela of small-vessel   ischemic disease.                  MACRO:   None        Signed by: Ryland Martin 7/18/2025 1:14 AM   Dictation workstation:   [id number]      XR knee right 4+ views    (Results Pending)      85 y.o. male with PMH of DMT2, HLD, HTN, CKD (baseline SCr 1.9-2.2, follows with Dr. Kline), CAD s/p CABG, PAF on Eliquis, presented with generalized weakness, recent confusion as reported by family.   Workup notable for mild tachycardia (A-fib 109 bpm on EKG), no acute ischemia), tachypnea, BP overall stable aside an isolated reading of 89/66, labs with baseline BUN/creatinine elevation, mildly elevated total bili 1.3 with stable LFTs otherwise lactate elevated 2.6/3.1/2.7, leukocytosis 22.5 (neutrophil/lymphocyte predominant), UA consistent with UTI, urine and blood cultures collected; CT head with chronic white matter changes, probable sequela of small vessel ischemic disease, no acute findings.    Assessment & Plan    Sepsis (UTI, leukocytosis, tachycardia, lactic acidosis, encephalopathy)   -afebrile, lactate normalized, mentation at baseline, has persistent wbc elevation today   -continue zosyn, FU bld NTD  and Ucx   - ID consulted Based on klebsiella sensitivies, ok to switch to PO augmentin 500/125 BID through end date 7/24/25  Follow up CBC from today --> WBC 14  -holding Jardiance     Exp wheezing on exam  S/p large volume IVF resuscitation   Hx CAD   Hx VARGAS for months, occasional CP  -stop further IVF, check bnp 398 > 269   CXR : slightly prominent interstitial  markings   TTE : EF 61%, improved EF   -consider Lasix dose based on clinical course/exam   -oupt FU with Dr Aden      DMT2 A1c 9.4 April'25  -metformin on hold, glipizide dose reduced for now, corrective scale Insulin in place, update A1c 8.6      CKD  -at baseline      A-Fib   -continue eliquis, BB     HypoK  -repleting      HTN  Isolated low BP  -monitor on home meds      HLD  -continue statin      VTE / GI prophylaxis   -on eliquis, PPI, bowel regimen in place      Discharge planning  -PT/OT       DANIELA Mayers-CNP  Seen and discussed with

## 2025-07-20 NOTE — CARE PLAN
The patient's goals for the shift include To get rest and medicine    The clinical goals for the shift include Pt will remain free of injury this safe throughout this shift    Over the shift, the patient did make progress toward the following goals. Barriers to progression include       Problem: Pain - Adult  Goal: Verbalizes/displays adequate comfort level or baseline comfort level  Outcome: Progressing  Flowsheets (Taken 7/20/2025 1055)  Verbalizes/displays adequate comfort level or baseline comfort level:   Encourage patient to monitor pain and request assistance   Assess pain using appropriate pain scale   Administer analgesics based on type and severity of pain and evaluate response   Implement non-pharmacological measures as appropriate and evaluate response   Consider cultural and social influences on pain and pain management   Notify Licensed Independent Practitioner if interventions unsuccessful or patient reports new pain     Problem: Safety - Adult  Goal: Free from fall injury  Outcome: Progressing  Flowsheets (Taken 7/20/2025 1055)  Free from fall injury: Instruct family/caregiver on patient safety     Problem: Discharge Planning  Goal: Discharge to home or other facility with appropriate resources  Outcome: Progressing  Flowsheets (Taken 7/20/2025 1055)  Discharge to home or other facility with appropriate resources:   Identify barriers to discharge with patient and caregiver   Arrange for needed discharge resources and transportation as appropriate   Identify discharge learning needs (meds, wound care, etc)   Arrange for interpreters to assist at discharge as needed   Refer to discharge planning if patient needs post-hospital services based on physician order or complex needs related to functional status, cognitive ability or social support system     Problem: Chronic Conditions and Co-morbidities  Goal: Patient's chronic conditions and co-morbidity symptoms are monitored and maintained or  improved  Outcome: Progressing  Flowsheets (Taken 7/20/2025 1055)  Care Plan - Patient's Chronic Conditions and Co-Morbidity Symptoms are Monitored and Maintained or Improved:   Monitor and assess patient's chronic conditions and comorbid symptoms for stability, deterioration, or improvement   Collaborate with multidisciplinary team to address chronic and comorbid conditions and prevent exacerbation or deterioration   Update acute care plan with appropriate goals if chronic or comorbid symptoms are exacerbated and prevent overall improvement and discharge     Problem: Nutrition  Goal: Nutrient intake appropriate for maintaining nutritional needs  Outcome: Progressing     Problem: Skin  Goal: Decreased wound size/increased tissue granulation at next dressing change  Outcome: Progressing  Flowsheets (Taken 7/20/2025 1055)  Decreased wound size/increased tissue granulation at next dressing change: Protective dressings over bony prominences  Goal: Participates in plan/prevention/treatment measures  Outcome: Progressing  Flowsheets (Taken 7/20/2025 1055)  Participates in plan/prevention/treatment measures: Increase activity/out of bed for meals  Goal: Prevent/manage excess moisture  Outcome: Progressing  Flowsheets (Taken 7/20/2025 1055)  Prevent/manage excess moisture: Cleanse incontinence/protect with barrier cream  Goal: Prevent/minimize sheer/friction injuries  Outcome: Progressing  Flowsheets (Taken 7/20/2025 1055)  Prevent/minimize sheer/friction injuries: Increase activity/out of bed for meals  Goal: Promote/optimize nutrition  Outcome: Progressing  Flowsheets (Taken 7/20/2025 1055)  Promote/optimize nutrition: Monitor/record intake including meals  Goal: Promote skin healing  Outcome: Progressing  Flowsheets (Taken 7/20/2025 1055)  Promote skin healing: Protective dressings over bony prominences

## 2025-07-20 NOTE — PROGRESS NOTES
07/20/25 0759   Discharge Planning   Home or Post Acute Services Post acute facilities (Rehab/SNF/etc)   Type of Post Acute Facility Services Skilled nursing   Expected Discharge Disposition SNF   Does the patient need discharge transport arranged? Yes   [facility] coordination needed? Yes   Has discharge transport been arranged? No   Patient Choice   Provider Choice list and CMS website (https://medicare.gov/care-compare#search) for post-acute Quality and Resource Measure Data were provided and reviewed with: Family   Patient / Family choosing to utilize agency / facility established prior to hospitalization No   Stroke Family Assessment   Stroke Family Assessment Needed No   Intensity of Service   Intensity of Service 0-30 min     Will request DSC send referrals to [facility].  Patient will need 3 inpatient midnights.  Sharron Mckeon RN

## 2025-07-21 ENCOUNTER — APPOINTMENT (OUTPATIENT)
Dept: RADIOLOGY | Facility: HOSPITAL | Age: 85
DRG: 872 | End: 2025-07-21
Payer: MEDICARE

## 2025-07-21 ENCOUNTER — APPOINTMENT (OUTPATIENT)
Dept: PRIMARY CARE | Facility: CLINIC | Age: 85
End: 2025-07-21
Payer: MEDICARE

## 2025-07-21 LAB
ALBUMIN SERPL BCP-MCNC: 3.3 G/DL (ref 3.4–5)
ALP SERPL-CCNC: 70 U/L (ref 33–136)
ALT SERPL W P-5'-P-CCNC: 22 U/L (ref 10–52)
ANION GAP SERPL CALC-SCNC: 13 MMOL/L (ref 10–20)
AST SERPL W P-5'-P-CCNC: 22 U/L (ref 9–39)
BASOPHILS # BLD AUTO: 0.07 X10*3/UL (ref 0–0.1)
BASOPHILS NFR BLD AUTO: 0.6 %
BILIRUB DIRECT SERPL-MCNC: 0.2 MG/DL (ref 0–0.3)
BILIRUB SERPL-MCNC: 1 MG/DL (ref 0–1.2)
BNP SERPL-MCNC: 380 PG/ML (ref 0–99)
BUN SERPL-MCNC: 27 MG/DL (ref 6–23)
CALCIUM SERPL-MCNC: 9.1 MG/DL (ref 8.6–10.3)
CHLORIDE SERPL-SCNC: 103 MMOL/L (ref 98–107)
CO2 SERPL-SCNC: 25 MMOL/L (ref 21–32)
CREAT SERPL-MCNC: 1.93 MG/DL (ref 0.5–1.3)
EGFRCR SERPLBLD CKD-EPI 2021: 34 ML/MIN/1.73M*2
EOSINOPHIL # BLD AUTO: 0.38 X10*3/UL (ref 0–0.4)
EOSINOPHIL NFR BLD AUTO: 3 %
ERYTHROCYTE [DISTWIDTH] IN BLOOD BY AUTOMATED COUNT: 13.8 % (ref 11.5–14.5)
GLUCOSE BLD MANUAL STRIP-MCNC: 161 MG/DL (ref 74–99)
GLUCOSE BLD MANUAL STRIP-MCNC: 195 MG/DL (ref 74–99)
GLUCOSE BLD MANUAL STRIP-MCNC: 223 MG/DL (ref 74–99)
GLUCOSE BLD MANUAL STRIP-MCNC: 241 MG/DL (ref 74–99)
GLUCOSE SERPL-MCNC: 173 MG/DL (ref 74–99)
HCT VFR BLD AUTO: 39.8 % (ref 41–52)
HGB BLD-MCNC: 13.3 G/DL (ref 13.5–17.5)
IMM GRANULOCYTES # BLD AUTO: 0.05 X10*3/UL (ref 0–0.5)
IMM GRANULOCYTES NFR BLD AUTO: 0.4 % (ref 0–0.9)
LYMPHOCYTES # BLD AUTO: 4.24 X10*3/UL (ref 0.8–3)
LYMPHOCYTES NFR BLD AUTO: 33.4 %
MAGNESIUM SERPL-MCNC: 1.75 MG/DL (ref 1.6–2.4)
MCH RBC QN AUTO: 30.4 PG (ref 26–34)
MCHC RBC AUTO-ENTMCNC: 33.4 G/DL (ref 32–36)
MCV RBC AUTO: 91 FL (ref 80–100)
MONOCYTES # BLD AUTO: 0.91 X10*3/UL (ref 0.05–0.8)
MONOCYTES NFR BLD AUTO: 7.2 %
NEUTROPHILS # BLD AUTO: 7.04 X10*3/UL (ref 1.6–5.5)
NEUTROPHILS NFR BLD AUTO: 55.4 %
NRBC BLD-RTO: 0 /100 WBCS (ref 0–0)
PLATELET # BLD AUTO: 180 X10*3/UL (ref 150–450)
POTASSIUM SERPL-SCNC: 3.7 MMOL/L (ref 3.5–5.3)
PROT SERPL-MCNC: 6.7 G/DL (ref 6.4–8.2)
RBC # BLD AUTO: 4.38 X10*6/UL (ref 4.5–5.9)
S PNEUM AG UR QL: NEGATIVE
SODIUM SERPL-SCNC: 137 MMOL/L (ref 136–145)
WBC # BLD AUTO: 12.7 X10*3/UL (ref 4.4–11.3)

## 2025-07-21 PROCEDURE — 71046 X-RAY EXAM CHEST 2 VIEWS: CPT | Performed by: RADIOLOGY

## 2025-07-21 PROCEDURE — 97116 GAIT TRAINING THERAPY: CPT | Mod: GP,CQ

## 2025-07-21 PROCEDURE — 97535 SELF CARE MNGMENT TRAINING: CPT | Mod: GO

## 2025-07-21 PROCEDURE — 2500000001 HC RX 250 WO HCPCS SELF ADMINISTERED DRUGS (ALT 637 FOR MEDICARE OP): Performed by: NURSE PRACTITIONER

## 2025-07-21 PROCEDURE — 71046 X-RAY EXAM CHEST 2 VIEWS: CPT

## 2025-07-21 PROCEDURE — 97530 THERAPEUTIC ACTIVITIES: CPT | Mod: GP,CQ

## 2025-07-21 PROCEDURE — 1100000001 HC PRIVATE ROOM DAILY

## 2025-07-21 PROCEDURE — 2500000002 HC RX 250 W HCPCS SELF ADMINISTERED DRUGS (ALT 637 FOR MEDICARE OP, ALT 636 FOR OP/ED): Performed by: NURSE PRACTITIONER

## 2025-07-21 PROCEDURE — 2500000001 HC RX 250 WO HCPCS SELF ADMINISTERED DRUGS (ALT 637 FOR MEDICARE OP): Performed by: HOSPITALIST

## 2025-07-21 PROCEDURE — 2500000001 HC RX 250 WO HCPCS SELF ADMINISTERED DRUGS (ALT 637 FOR MEDICARE OP): Performed by: STUDENT IN AN ORGANIZED HEALTH CARE EDUCATION/TRAINING PROGRAM

## 2025-07-21 PROCEDURE — 80048 BASIC METABOLIC PNL TOTAL CA: CPT | Performed by: NURSE PRACTITIONER

## 2025-07-21 PROCEDURE — 83880 ASSAY OF NATRIURETIC PEPTIDE: CPT | Performed by: NURSE PRACTITIONER

## 2025-07-21 PROCEDURE — 36415 COLL VENOUS BLD VENIPUNCTURE: CPT | Performed by: NURSE PRACTITIONER

## 2025-07-21 PROCEDURE — 2500000005 HC RX 250 GENERAL PHARMACY W/O HCPCS: Performed by: NURSE PRACTITIONER

## 2025-07-21 PROCEDURE — 85025 COMPLETE CBC W/AUTO DIFF WBC: CPT | Performed by: NURSE PRACTITIONER

## 2025-07-21 PROCEDURE — 82947 ASSAY GLUCOSE BLOOD QUANT: CPT

## 2025-07-21 PROCEDURE — 83735 ASSAY OF MAGNESIUM: CPT | Performed by: NURSE PRACTITIONER

## 2025-07-21 PROCEDURE — 97530 THERAPEUTIC ACTIVITIES: CPT | Mod: GO

## 2025-07-21 PROCEDURE — 82248 BILIRUBIN DIRECT: CPT | Performed by: NURSE PRACTITIONER

## 2025-07-21 RX ADMIN — AMOXICILLIN AND CLAVULANATE POTASSIUM 1 TABLET: 500; 125 TABLET, FILM COATED ORAL at 08:38

## 2025-07-21 RX ADMIN — LATANOPROST 1 DROP: 50 SOLUTION/ DROPS OPHTHALMIC at 20:38

## 2025-07-21 RX ADMIN — LOSARTAN POTASSIUM 12.5 MG: 25 TABLET, FILM COATED ORAL at 08:37

## 2025-07-21 RX ADMIN — ATORVASTATIN CALCIUM 40 MG: 40 TABLET, FILM COATED ORAL at 20:38

## 2025-07-21 RX ADMIN — INSULIN LISPRO 2 UNITS: 100 INJECTION, SOLUTION INTRAVENOUS; SUBCUTANEOUS at 16:55

## 2025-07-21 RX ADMIN — INSULIN LISPRO 4 UNITS: 100 INJECTION, SOLUTION INTRAVENOUS; SUBCUTANEOUS at 12:08

## 2025-07-21 RX ADMIN — PANTOPRAZOLE SODIUM 40 MG: 40 TABLET, DELAYED RELEASE ORAL at 06:11

## 2025-07-21 RX ADMIN — LIDOCAINE 4% 1 PATCH: 40 PATCH TOPICAL at 08:35

## 2025-07-21 RX ADMIN — Medication 50 MCG: at 08:38

## 2025-07-21 RX ADMIN — APIXABAN 2.5 MG: 2.5 TABLET, FILM COATED ORAL at 08:38

## 2025-07-21 RX ADMIN — GLIPIZIDE 5 MG: 2.5 TABLET, EXTENDED RELEASE ORAL at 16:54

## 2025-07-21 RX ADMIN — GLIPIZIDE 5 MG: 2.5 TABLET, EXTENDED RELEASE ORAL at 06:11

## 2025-07-21 RX ADMIN — SENNOSIDES 17.2 MG: 8.6 TABLET, FILM COATED ORAL at 20:38

## 2025-07-21 RX ADMIN — AMOXICILLIN AND CLAVULANATE POTASSIUM 1 TABLET: 500; 125 TABLET, FILM COATED ORAL at 20:38

## 2025-07-21 RX ADMIN — HYDROCHLOROTHIAZIDE 25 MG: 25 TABLET ORAL at 08:39

## 2025-07-21 RX ADMIN — APIXABAN 2.5 MG: 2.5 TABLET, FILM COATED ORAL at 20:38

## 2025-07-21 RX ADMIN — INSULIN LISPRO 2 UNITS: 100 INJECTION, SOLUTION INTRAVENOUS; SUBCUTANEOUS at 08:32

## 2025-07-21 RX ADMIN — METOPROLOL SUCCINATE 25 MG: 25 TABLET, EXTENDED RELEASE ORAL at 08:39

## 2025-07-21 ASSESSMENT — COGNITIVE AND FUNCTIONAL STATUS - GENERAL
MOVING FROM LYING ON BACK TO SITTING ON SIDE OF FLAT BED WITH BEDRAILS: A LITTLE
MOBILITY SCORE: 18
WALKING IN HOSPITAL ROOM: A LITTLE
MOVING TO AND FROM BED TO CHAIR: A LITTLE
STANDING UP FROM CHAIR USING ARMS: A LITTLE
DAILY ACTIVITIY SCORE: 24
WALKING IN HOSPITAL ROOM: A LITTLE
MOBILITY SCORE: 18
TOILETING: A LITTLE
TURNING FROM BACK TO SIDE WHILE IN FLAT BAD: A LITTLE
DAILY ACTIVITIY SCORE: 17
CLIMB 3 TO 5 STEPS WITH RAILING: A LITTLE
DRESSING REGULAR UPPER BODY CLOTHING: A LITTLE
DRESSING REGULAR LOWER BODY CLOTHING: A LOT
TURNING FROM BACK TO SIDE WHILE IN FLAT BAD: A LITTLE
CLIMB 3 TO 5 STEPS WITH RAILING: A LOT
PERSONAL GROOMING: A LITTLE
STANDING UP FROM CHAIR USING ARMS: A LITTLE
HELP NEEDED FOR BATHING: A LOT
MOVING TO AND FROM BED TO CHAIR: A LITTLE

## 2025-07-21 ASSESSMENT — PAIN - FUNCTIONAL ASSESSMENT
PAIN_FUNCTIONAL_ASSESSMENT: 0-10

## 2025-07-21 ASSESSMENT — PAIN SCALES - GENERAL
PAINLEVEL_OUTOF10: 0 - NO PAIN

## 2025-07-21 ASSESSMENT — ACTIVITIES OF DAILY LIVING (ADL): HOME_MANAGEMENT_TIME_ENTRY: 30

## 2025-07-21 NOTE — PROGRESS NOTES
07/21/25 0757   Discharge Planning   Expected Discharge Disposition SNF     Pt day 3 UTI. Con on IV atbs. White count trending down. SNFs reviewing. Adod 1-2 days.

## 2025-07-21 NOTE — CARE PLAN
The patient's goals for the shift include To get rest and medicine    The clinical goals for the shift include Pt will remain free of injury this safe throughout this shift      Problem: Safety - Adult  Goal: Free from fall injury  Outcome: Progressing     Problem: Pain - Adult  Goal: Verbalizes/displays adequate comfort level or baseline comfort level  Outcome: Progressing

## 2025-07-21 NOTE — PROGRESS NOTES
Physical Therapy    Physical Therapy Treatment  Patient Name: Mauro Grande  MRN: [id number]  Department: [facility]  Room: 524/524-A  Today's Date: 7/21/2025  Time Calculation  Start Time: 1333  Stop Time: 1359  Time Calculation (min): 26 min  Assessment/Plan   PT Assessment  PT Assessment Results: Decreased strength, Decreased endurance, Impaired balance, Decreased mobility  Rehab Prognosis: Good  Barriers to Discharge Home: Physical needs  Physical Needs: Intermittent mobility assistance needed, Intermittent ADL assistance needed, High falls risk due to function or environment  Evaluation/Treatment Tolerance: Patient tolerated treatment well  Medical Staff Made Aware: Yes  End of Session Communication: Bedside nurse  Assessment Comment: Pt very motivated to participate in therapy. Pt presents with decreased safety awareness at times requiring increased cues to ensure safety. pt with gait deficits and requiring CGA intermittently for safety concerns.  End of Session Patient Position: Bed, 3 rail up, Alarm on  PT Plan  Inpatient/Swing Bed or Outpatient: Inpatient  PT Plan  Treatment/Interventions: Bed mobility, Transfer training, Gait training, Therapeutic activity  PT Plan: Ongoing PT  PT Frequency: 3 times per week (during this acute rehabilitation hospitalization)  PT Discharge Recommendations: Moderate intensity level of continued care (Based on current functional status and rehab potential, patient is anticipated to tolerate and benefit from 5 or more days per week of skilled rehab therapy after DC from this acute inpatient hospitalization.)  Equipment Recommended upon Discharge:  (script for rollator?)  PT Recommended Transfer Status: Assist x1, Assistive device  PT - OK to Discharge: Yes (per PT POC)  PT Visit Info:  PT Received On: 07/21/25   General Visit Information:   General  Reason for Referral: ED with c/o generalized weakness after fall in bathroom at home, unable to get up from floor  Referred By:  Ronda Sanches MD  Prior to Session Communication: Bedside nurse  Patient Position Received: Up in chair, Alarm on  Preferred Learning Style: verbal, visual  General Comment: Pt up in chair, pleasant and agreeable to PT session.  Subjective   Precautions:  Precautions  Hearing/Visual Limitations: glasses (+); hearing aides (+)  Medical Precautions: Fall precautions  Precautions Comment: tele  Objective   Pain:  Pain Assessment  Pain Assessment: 0-10  0-10 (Numeric) Pain Score: 0 - No pain  Cognition:  Cognition  Overall Cognitive Status: Within Functional Limits  Orientation Level: Oriented X4  Postural Control:  Static Sitting Balance  Static Sitting-Balance Support: No upper extremity supported, Feet supported  Static Sitting-Level of Assistance: Distant supervision  Dynamic Sitting Balance  Dynamic Sitting-Balance Support: No upper extremity supported, Feet supported  Dynamic Sitting-Level of Assistance: Close supervision  Dynamic Sitting-Balance: Lateral lean, Reaching for objects, Forward lean, Trunk control activities  Static Standing Balance  Static Standing-Balance Support: Bilateral upper extremity supported  Static Standing-Level of Assistance: Close supervision  Dynamic Standing Balance  Dynamic Standing-Balance Support: Bilateral upper extremity supported  Dynamic Standing-Level of Assistance: Contact guard  Dynamic Standing-Balance: Turning  Treatments:  Therapeutic Activity  Therapeutic Activity Performed: Yes  Therapeutic Activity 1: Pt education on safety precautions to enhance safety awareness with functional mobility and ambulation.  Bed Mobility  Bed Mobility: Yes  Bed Mobility 1  Bed Mobility 1: Sitting to supine  Level of Assistance 1: Close supervision  Ambulation/Gait Training  Ambulation/Gait Training Performed: Yes  Ambulation/Gait Training 1  Surface 1: Level tile  Device 1: Rolling walker  Gait Support Devices: Gait belt  Assistance 1: Contact guard, Close supervision  Quality of Gait  1: Wide base of support, Diminished heel strike, Decreased step length  Comments/Distance (ft) 1: 2 x 80'  Transfers  Transfer: Yes  Transfer 1  Transfer From 1: Chair with arms to  Transfer to 1: Stand  Technique 1: Sit to stand, Stand to sit  Transfer Device 1: Walker, Gait belt  Transfer Level of Assistance 1: Minimum assistance  Transfers 2  Transfer From 2: Stand to  Transfer to 2: Bed  Technique 2: Stand pivot  Transfer Device 2: Walker, Gait belt  Transfer Level of Assistance 2: Contact guard  Trials/Comments 2: Increased cues for hand placement as pt abruptly sits without reaching back to surface.  Outcome Measures:  [facility] Basic Mobility  Turning from your back to your side while in a flat bed without using bedrails: A little  Moving from lying on your back to sitting on the side of a flat bed without using bedrails: A little  Moving to and from bed to chair (including a wheelchair): A little  Standing up from a chair using your arms (e.g. wheelchair or bedside chair): A little  To walk in hospital room: A little  Climbing 3-5 steps with railing: A little  Basic Mobility - Total Score: 18    Education Documentation  Precautions, taught by Sho Alvarado PTA at 7/21/2025  2:49 PM.  Learner: Patient  Readiness: Acceptance  Method: Explanation  Response: Verbalizes Understanding    Body Mechanics, taught by Sho Alvarado PTA at 7/21/2025  2:49 PM.  Learner: Patient  Readiness: Acceptance  Method: Explanation  Response: Verbalizes Understanding    Mobility Training, taught by Sho Alvarado PTA at 7/21/2025  2:49 PM.  Learner: Patient  Readiness: Acceptance  Method: Explanation  Response: Verbalizes Understanding    Education Comments  No comments found.      OP EDUCATION:     Encounter Problems       Encounter Problems (Active)       Balance       STG - Maintains dynamic standing balance without upper extremity support IND for >5 minutes while performing ADLs (Progressing)       Start:  07/19/25    Expected  End:  08/02/25               Mobility       LTG - Patient will navigate 4 steps with rails/device or LRAD and SBA (Progressing)       Start:  07/19/25    Expected End:  08/02/25            STG - Patient will ambulate ~200ft with LRAD and SBA (Progressing)       Start:  07/19/25    Expected End:  08/02/25               PT Transfers       STG - Patient will perform bed mobility IND (Progressing)       Start:  07/19/25    Expected End:  08/02/25            STG - Patient will transfer sit to and from stand with LRAD and SBA (Progressing)       Start:  07/19/25    Expected End:  08/02/25

## 2025-07-21 NOTE — CARE PLAN
The patient's goals for the shift include To get rest and medicine    The clinical goals for the shift include safety    Over the shift, the patient did make progress toward the following goals. Barriers to progression include      Problem: Pain - Adult  Goal: Verbalizes/displays adequate comfort level or baseline comfort level  Outcome: Progressing  Flowsheets (Taken 7/21/2025 1120)  Verbalizes/displays adequate comfort level or baseline comfort level:   Encourage patient to monitor pain and request assistance   Assess pain using appropriate pain scale   Administer analgesics based on type and severity of pain and evaluate response   Implement non-pharmacological measures as appropriate and evaluate response   Consider cultural and social influences on pain and pain management   Notify Licensed Independent Practitioner if interventions unsuccessful or patient reports new pain     Problem: Safety - Adult  Goal: Free from fall injury  Outcome: Progressing  Flowsheets (Taken 7/21/2025 1120)  Free from fall injury: Instruct family/caregiver on patient safety     Problem: Discharge Planning  Goal: Discharge to home or other facility with appropriate resources  Outcome: Progressing  Flowsheets (Taken 7/21/2025 1120)  Discharge to home or other facility with appropriate resources:   Identify barriers to discharge with patient and caregiver   Arrange for needed discharge resources and transportation as appropriate   Identify discharge learning needs (meds, wound care, etc)   Arrange for interpreters to assist at discharge as needed   Refer to discharge planning if patient needs post-hospital services based on physician order or complex needs related to functional status, cognitive ability or social support system     Problem: Chronic Conditions and Co-morbidities  Goal: Patient's chronic conditions and co-morbidity symptoms are monitored and maintained or improved  Outcome: Progressing  Flowsheets (Taken 7/21/2025  1120)  Care Plan - Patient's Chronic Conditions and Co-Morbidity Symptoms are Monitored and Maintained or Improved:   Monitor and assess patient's chronic conditions and comorbid symptoms for stability, deterioration, or improvement   Collaborate with multidisciplinary team to address chronic and comorbid conditions and prevent exacerbation or deterioration   Update acute care plan with appropriate goals if chronic or comorbid symptoms are exacerbated and prevent overall improvement and discharge     Problem: Nutrition  Goal: Nutrient intake appropriate for maintaining nutritional needs  Outcome: Progressing     Problem: Skin  Goal: Decreased wound size/increased tissue granulation at next dressing change  Outcome: Progressing  Flowsheets (Taken 7/21/2025 1120)  Decreased wound size/increased tissue granulation at next dressing change: Protective dressings over bony prominences  Goal: Participates in plan/prevention/treatment measures  Outcome: Progressing  Flowsheets (Taken 7/21/2025 1120)  Participates in plan/prevention/treatment measures: Increase activity/out of bed for meals  Goal: Prevent/manage excess moisture  Outcome: Progressing  Flowsheets (Taken 7/21/2025 1120)  Prevent/manage excess moisture: Cleanse incontinence/protect with barrier cream  Goal: Prevent/minimize sheer/friction injuries  Outcome: Progressing  Flowsheets (Taken 7/21/2025 1120)  Prevent/minimize sheer/friction injuries: Increase activity/out of bed for meals  Goal: Promote/optimize nutrition  Outcome: Progressing  Flowsheets (Taken 7/21/2025 1120)  Promote/optimize nutrition: Monitor/record intake including meals  Goal: Promote skin healing  Outcome: Progressing  Flowsheets (Taken 7/21/2025 1120)  Promote skin healing: Protective dressings over bony prominences

## 2025-07-21 NOTE — PROGRESS NOTES
Occupational Therapy    OT Treatment    Patient Name: Mauro Grande  MRN: [id number]  Department: [facility]  Room: 524/524-A  Today's Date: 2025  Time Calculation  Start Time: 1053  Stop Time: 1132  Time Calculation (min): 39 min        Assessment:  OT Assessment: Pt progressing with OT for ADLs and transfers. Pt requiring assist due to impaired balance and unsteadiness during dynamic standing tasks. Cues for safety while turning.     OT Adult Other Outcome Measures  Modified Barthel Index (Mendoza version): 61  Patient scored 61 on MBI (Modified Barthel Index-Mendoza Version) indicatin-90 considered moderate dependence. Per the scoring guidelines, this score indicates a prediction of: 60-80: If living alone will probably need a number of community services to cope.    Deficits noted in the following areas Balance, activity tolerance, strength.    Prognosis: Good  Evaluation/Treatment Tolerance: Patient tolerated treatment well  Medical Staff Made Aware: Yes  End of Session Communication: Bedside nurse  End of Session Patient Position: Up in chair, Alarm on  Prognosis: Good  Evaluation/Treatment Tolerance: Patient tolerated treatment well  Medical Staff Made Aware: Yes  Plan:  Treatment Interventions: ADL retraining, Functional transfer training, UE strengthening/ROM, Endurance training, Compensatory technique education  OT Frequency: 3 times per week (during this inpatient hospitalization)  OT Discharge Recommendations: Moderate intensity level of continued care (Based on current functional status and rehab potential, patient is anticipated to tolerate and benefit from 5 or more days per week of skilled rehabilitative therapy after discharge from this acute inpatient hospitalization.)  Equipment Recommended upon Discharge: Wheeled walker  OT Recommended Transfer Status: Assist of 1  OT - OK to Discharge: Yes  Treatment Interventions: ADL retraining, Functional transfer training, UE strengthening/ROM, Endurance  training, Compensatory technique education    Subjective   OT Visit Info:  OT Received On: 07/21/25  General Visit Info:  General  Reason for Referral: ED with c/o generalized weakness after fall in bathroom at home, unable to get up from floor  Referred By: Ronda Sanches MD  Past Medical History Relevant to Rehab: Medical History[1]    Family/Caregiver Present: No  Prior to Session Communication: Bedside nurse, PCT/NA/CTA  Patient Position Received:  (pt standing from toilet with aide)  Preferred Learning Style: verbal, visual  General Comment: pt motivated to patricipate in OT  Precautions:  Hearing/Visual Limitations: glasses (+); hearing aides (+)  Medical Precautions: Fall precautions    Pain:  Pain Assessment  Pain Assessment: 0-10  0-10 (Numeric) Pain Score: 0 - No pain    Objective    Cognition:  Cognition  Overall Cognitive Status: Within Functional Limits  Orientation Level: Oriented X4     Activities of Daily Living: Grooming  Grooming Level of Assistance: Contact guard  Grooming Where Assessed:  (standing from chair with tray table to complete oral care; CGA-SUP standing at the sink to wash hands; seated in chair to wash face/neck)    Toileting  Toileting Level of Assistance: Contact guard  Where Assessed: Toilet  Toileting Comments: CGA, pt stood at toilet to complete, R UE on grab bar for stability     Bed Mobility/Transfers: Transfers  Transfer: Yes  Transfer 1  Technique 1: Sit to stand, Stand to sit  Transfer Device 1: Walker, Gait belt  Transfer Level of Assistance 1: Minimum assistance  Trials/Comments 1: pt progressing during session, min A required to complete stand    Functional Mobility:  Functional Mobility  Functional Mobility Performed: Yes  Functional Mobility 1  Surface 1: Level tile  Device 1: Rolling walker  Functional Mobility Support Devices: Gait belt  Assistance 1: Contact guard  Comments 1: pt completed functional ambulation max HH distance, cues for sequencing and safety  upon turning with the FWW    Standing Balance:  Static Standing Balance  Static Standing-Balance Support: Bilateral upper extremity supported, No upper extremity supported (intermittent UE support during ADLs at sink and tray table)  Static Standing-Level of Assistance: Contact guard, Close supervision  Static Standing-Comment/Number of Minutes: CGA without UE support, SUP with UE support     Therapy/Activity: Therapeutic Activity  Therapeutic Activity Performed: Yes  Therapeutic Activity 1: pt completed functional ambulation in hallway with CGA and cues for safety, pt complete ADLs in standing to address deficits in activity tolerance, strength, and balance.  Outcome Measures:[facility] Daily Activity  Putting on and taking off regular lower body clothing: A lot  Bathing (including washing, rinsing, drying): A lot  Putting on and taking off regular upper body clothing: A little  Toileting, which includes using toilet, bedpan or urinal: A little  Taking care of personal grooming such as brushing teeth: A little  Eating Meals: None  Daily Activity - Total Score: 17   OT Adult Other Outcome Measures  Modified Barthel Index (Mendoza version): 61    Education Documentation  Body Mechanics, taught by Liana Bolanos OT at 7/21/2025  1:04 PM.  Learner: Patient  Readiness: Acceptance  Method: Explanation, Demonstration  Response: Verbalizes Understanding, Demonstrated Understanding    ADL Training, taught by Liana Bolanos OT at 7/21/2025  1:04 PM.  Learner: Patient  Readiness: Acceptance  Method: Explanation, Demonstration  Response: Verbalizes Understanding, Demonstrated Understanding    Education Comments  No comments found.           Goals:  Encounter Problems       Encounter Problems (Active)       ADLs       Patient will perform UB and LB bathing with modified independent level of assistance. (Progressing)       Start:  07/20/25    Expected End:  08/03/25            Patient with complete upper body dressing with modified  independent level of assistance donning and doffing all UE clothes. (Progressing)       Start:  07/20/25    Expected End:  08/03/25            Patient with complete lower body dressing with modified independent level of assistance donning and doffing all LE clothes. (Progressing)       Start:  07/20/25    Expected End:  08/03/25            Patient will complete daily grooming tasks brushing teeth with modified independent level of assistance and PRN adaptive equipment while standing. (Progressing)       Start:  07/20/25    Expected End:  08/03/25            Patient will complete toileting including hygiene clothing management/hygiene with modified independent level of assistance. (Progressing)       Start:  07/20/25    Expected End:  08/03/25               MOBILITY       Patient will perform Functional mobility max Household distances/Community Distances with stand by assist level of assistance and front wheeled walker in order to improve safety and functional mobility. (Progressing)       Start:  07/20/25    Expected End:  08/03/25               TRANSFERS       Patient will complete sit to stand transfer with stand by assist level of assistance and front wheeled walker in order to improve safety and prepare for out of bed mobility. (Progressing)       Start:  07/20/25    Expected End:  08/03/25                                  [1]   Past Medical History:  Diagnosis Date    Diabetes mellitus (Multi)     Hyperlipemia     Hypertension

## 2025-07-21 NOTE — PROGRESS NOTES
07/21/25 1150   Discharge Planning   Expected Discharge Disposition SNF     [facility] spoke with pt wife Yessy on the phone [phone]. [facility]. Referral sent.

## 2025-07-21 NOTE — PROGRESS NOTES
"Mauro Grande is a 85 y.o. male on day 3 of admission presenting with Sepsis, due to unspecified organism, unspecified whether acute organ dysfunction present (Multi).    Subjective   Admitted after a fall at home  UA consistent with UTI > Klebsiella and sensitivities noted   Urine and blood cultures NTD pending   ID consulted :Based on klebsiella sensitivies, ok to switch to PO augmentin 500/125 BID through end date 7/24/25  Follow up CBC from today --> WBC 12.7  Right knee pain x ray done   Needs PT/OT eval > SNF       Pt denies any shortness of breath   No ches pain   Mild wheezing       Objective     Physical Exam  Constitutional:       Appearance: Normal appearance.   HENT:      Mouth/Throat:      Mouth: Mucous membranes are moist.     Cardiovascular:      Rate and Rhythm: Regular rhythm.   Pulmonary:      Effort: Pulmonary effort is normal.      Breath sounds: Normal breath sounds.   Abdominal:      Palpations: Abdomen is soft.     Musculoskeletal:         General: Normal range of motion.     Skin:     General: Skin is warm and dry.     Neurological:      General: No focal deficit present.      Mental Status: He is alert and oriented to person, place, and time.      Comments: Answers all questions  Aware of events prior to hospitalization        Last Recorded Vitals  Blood pressure 137/89, pulse 105, temperature 36.8 °C (98.3 °F), temperature source Temporal, resp. rate 18, height 1.753 m (5' 9\"), weight 96.6 kg (212 lb 15.4 oz), SpO2 99%.  Intake/Output last 3 Shifts:  I/O last 3 completed shifts:  In: 372.5 (3.9 mL/kg) [P.O.:240; I.V.:32.5 (0.3 mL/kg); IV Piggyback:100]  Out: 1875 (19.4 mL/kg) [Urine:1875 (0.5 mL/kg/hr)]  Weight: 96.6 kg     Relevant Results  Results for orders placed or performed during the hospital encounter of 07/18/25 (from the past 24 hours)   POCT GLUCOSE   Result Value Ref Range    POCT Glucose 256 (H) 74 - 99 mg/dL   Basic Metabolic Panel   Result Value Ref Range    Glucose 173 (H) " 74 - 99 mg/dL    Sodium 137 136 - 145 mmol/L    Potassium 3.7 3.5 - 5.3 mmol/L    Chloride 103 98 - 107 mmol/L    Bicarbonate 25 21 - 32 mmol/L    Anion Gap 13 10 - 20 mmol/L    Urea Nitrogen 27 (H) 6 - 23 mg/dL    Creatinine 1.93 (H) 0.50 - 1.30 mg/dL    eGFR 34 (L) >60 mL/min/1.73m*2    Calcium 9.1 8.6 - 10.3 mg/dL   CBC and Auto Differential   Result Value Ref Range    WBC 12.7 (H) 4.4 - 11.3 x10*3/uL    nRBC 0.0 0.0 - 0.0 /100 WBCs    RBC 4.38 (L) 4.50 - 5.90 x10*6/uL    Hemoglobin 13.3 (L) 13.5 - 17.5 g/dL    Hematocrit 39.8 (L) 41.0 - 52.0 %    MCV 91 80 - 100 fL    MCH 30.4 26.0 - 34.0 pg    MCHC 33.4 32.0 - 36.0 g/dL    RDW 13.8 11.5 - 14.5 %    Platelets 180 150 - 450 x10*3/uL    Neutrophils % 55.4 40.0 - 80.0 %    Immature Granulocytes %, Automated 0.4 0.0 - 0.9 %    Lymphocytes % 33.4 13.0 - 44.0 %    Monocytes % 7.2 2.0 - 10.0 %    Eosinophils % 3.0 0.0 - 6.0 %    Basophils % 0.6 0.0 - 2.0 %    Neutrophils Absolute 7.04 (H) 1.60 - 5.50 x10*3/uL    Immature Granulocytes Absolute, Automated 0.05 0.00 - 0.50 x10*3/uL    Lymphocytes Absolute 4.24 (H) 0.80 - 3.00 x10*3/uL    Monocytes Absolute 0.91 (H) 0.05 - 0.80 x10*3/uL    Eosinophils Absolute 0.38 0.00 - 0.40 x10*3/uL    Basophils Absolute 0.07 0.00 - 0.10 x10*3/uL   Magnesium   Result Value Ref Range    Magnesium 1.75 1.60 - 2.40 mg/dL   Hepatic Function Panel   Result Value Ref Range    Albumin 3.3 (L) 3.4 - 5.0 g/dL    Bilirubin, Total 1.0 0.0 - 1.2 mg/dL    Bilirubin, Direct 0.2 0.0 - 0.3 mg/dL    Alkaline Phosphatase 70 33 - 136 U/L    ALT 22 10 - 52 U/L    AST 22 9 - 39 U/L    Total Protein 6.7 6.4 - 8.2 g/dL   B-Type Natriuretic Peptide   Result Value Ref Range     (H) 0 - 99 pg/mL   POCT GLUCOSE   Result Value Ref Range    POCT Glucose 161 (H) 74 - 99 mg/dL   POCT GLUCOSE   Result Value Ref Range    POCT Glucose 223 (H) 74 - 99 mg/dL   POCT GLUCOSE   Result Value Ref Range    POCT Glucose 241 (H) 74 - 99 mg/dL   POCT GLUCOSE   Result Value  Ref Range    POCT Glucose 195 (H) 74 - 99 mg/dL      XR knee right 4+ views   Final Result   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        No acute fracture or dislocation.        Prominent posterior arterial calcifications.        MACRO:   None        Signed by: Eligio Watson 7/21/2025 8:10 AM   Dictation workstation:   [id number]      Transthoracic Echo Complete   Final Result      XR chest 1 view   Final Result   1. No discrete consolidation.             I personally reviewed the images/study, and I agree with the findings   as stated above by resident physician Dr. Josefina Patton MD. This   study was interpreted at [place], [place].        MACRO:   None        Signed by: Fabi Paula 7/18/2025 12:35 PM   Dictation workstation:   [id number]      CT head wo IV contrast   Final Result   1. No acute intracranial abnormality identified.   2. Chronic white matter changes, likely sequela of small-vessel   ischemic disease.                  MACRO:   None        Signed by: Ryland Martin 7/18/2025 1:14 AM   Dictation workstation:   [id number]         85 y.o. male with PMH of DMT2, HLD, HTN, CKD (baseline SCr 1.9-2.2, follows with Dr. Kline), CAD s/p CABG, PAF on Eliquis, presented with generalized weakness, recent confusion as reported by family.   Workup notable for mild tachycardia (A-fib 109 bpm on EKG), no acute ischemia), tachypnea, BP overall stable aside an isolated reading of 89/66, labs with baseline BUN/creatinine elevation, mildly elevated total bili 1.3 with stable LFTs otherwise lactate elevated 2.6/3.1/2.7, leukocytosis 22.5 (neutrophil/lymphocyte predominant), UA consistent with UTI, urine and blood cultures collected; CT head with chronic white matter changes, probable sequela of small vessel ischemic disease, no acute findings.    Assessment & Plan    Sepsis (UTI, leukocytosis, tachycardia, lactic acidosis, encephalopathy)    -afebrile, lactate normalized, mentation at baseline, has persistent wbc elevation today   -continue zosyn, FU bld NTD  and Ucx   - ID consulted Based on klebsiella sensitivies, ok to switch to PO augmentin 500/125 BID through end date 7/24/25  Follow up CBC from today --> WBC 14  -holding Jardiance     Exp wheezing on exam  S/p large volume IVF resuscitation   Hx CAD   Hx VARGAS for months, occasional CP  -stop further IVF, check bnp 398 > 269   CXR : slightly prominent interstitial markings   TTE : EF 61%, improved EF   Does have good oxygenation   Will repeat cxr to eval further     DMT2 A1c 9.4 April'25  -metformin on hold, glipizide dose reduced for now, corrective scale Insulin in place, update A1c 8.6      CKD  -at baseline      A-Fib   -continue eliquis, BB     HypoK  better     HTN  Isolated low BP  -monitor on home meds      HLD  -continue statin      VTE / GI prophylaxis   -on eliquis, PPI, bowel regimen in place      Discharge planning  -PT/OT       Larry Vasquez MD

## 2025-07-22 LAB
ALBUMIN SERPL BCP-MCNC: 3.6 G/DL (ref 3.4–5)
ALP SERPL-CCNC: 76 U/L (ref 33–136)
ALT SERPL W P-5'-P-CCNC: 25 U/L (ref 10–52)
ANION GAP SERPL CALC-SCNC: 16 MMOL/L (ref 10–20)
AST SERPL W P-5'-P-CCNC: 23 U/L (ref 9–39)
BACTERIA BLD CULT: NORMAL
BACTERIA BLD CULT: NORMAL
BASOPHILS # BLD AUTO: 0.08 X10*3/UL (ref 0–0.1)
BASOPHILS NFR BLD AUTO: 0.6 %
BILIRUB DIRECT SERPL-MCNC: 0.2 MG/DL (ref 0–0.3)
BILIRUB SERPL-MCNC: 1 MG/DL (ref 0–1.2)
BUN SERPL-MCNC: 28 MG/DL (ref 6–23)
CALCIUM SERPL-MCNC: 9.3 MG/DL (ref 8.6–10.3)
CHLORIDE SERPL-SCNC: 103 MMOL/L (ref 98–107)
CO2 SERPL-SCNC: 22 MMOL/L (ref 21–32)
CREAT SERPL-MCNC: 1.84 MG/DL (ref 0.5–1.3)
EGFRCR SERPLBLD CKD-EPI 2021: 36 ML/MIN/1.73M*2
EOSINOPHIL # BLD AUTO: 0.48 X10*3/UL (ref 0–0.4)
EOSINOPHIL NFR BLD AUTO: 3.8 %
ERYTHROCYTE [DISTWIDTH] IN BLOOD BY AUTOMATED COUNT: 13.9 % (ref 11.5–14.5)
GLUCOSE BLD MANUAL STRIP-MCNC: 201 MG/DL (ref 74–99)
GLUCOSE BLD MANUAL STRIP-MCNC: 212 MG/DL (ref 74–99)
GLUCOSE BLD MANUAL STRIP-MCNC: 229 MG/DL (ref 74–99)
GLUCOSE BLD MANUAL STRIP-MCNC: 311 MG/DL (ref 74–99)
GLUCOSE SERPL-MCNC: 156 MG/DL (ref 74–99)
HCT VFR BLD AUTO: 41.9 % (ref 41–52)
HGB BLD-MCNC: 14.2 G/DL (ref 13.5–17.5)
IMM GRANULOCYTES # BLD AUTO: 0.07 X10*3/UL (ref 0–0.5)
IMM GRANULOCYTES NFR BLD AUTO: 0.6 % (ref 0–0.9)
LYMPHOCYTES # BLD AUTO: 5.04 X10*3/UL (ref 0.8–3)
LYMPHOCYTES NFR BLD AUTO: 39.7 %
MAGNESIUM SERPL-MCNC: 1.71 MG/DL (ref 1.6–2.4)
MCH RBC QN AUTO: 30.6 PG (ref 26–34)
MCHC RBC AUTO-ENTMCNC: 33.9 G/DL (ref 32–36)
MCV RBC AUTO: 90 FL (ref 80–100)
MONOCYTES # BLD AUTO: 1 X10*3/UL (ref 0.05–0.8)
MONOCYTES NFR BLD AUTO: 7.9 %
NEUTROPHILS # BLD AUTO: 6.04 X10*3/UL (ref 1.6–5.5)
NEUTROPHILS NFR BLD AUTO: 47.4 %
NRBC BLD-RTO: 0 /100 WBCS (ref 0–0)
PLATELET # BLD AUTO: 189 X10*3/UL (ref 150–450)
POTASSIUM SERPL-SCNC: 3.9 MMOL/L (ref 3.5–5.3)
PROT SERPL-MCNC: 6.5 G/DL (ref 6.4–8.2)
RBC # BLD AUTO: 4.64 X10*6/UL (ref 4.5–5.9)
SODIUM SERPL-SCNC: 137 MMOL/L (ref 136–145)
WBC # BLD AUTO: 12.7 X10*3/UL (ref 4.4–11.3)

## 2025-07-22 PROCEDURE — 83735 ASSAY OF MAGNESIUM: CPT | Performed by: NURSE PRACTITIONER

## 2025-07-22 PROCEDURE — 85025 COMPLETE CBC W/AUTO DIFF WBC: CPT | Performed by: NURSE PRACTITIONER

## 2025-07-22 PROCEDURE — 80048 BASIC METABOLIC PNL TOTAL CA: CPT | Performed by: NURSE PRACTITIONER

## 2025-07-22 PROCEDURE — 36415 COLL VENOUS BLD VENIPUNCTURE: CPT | Performed by: NURSE PRACTITIONER

## 2025-07-22 PROCEDURE — 82248 BILIRUBIN DIRECT: CPT | Performed by: NURSE PRACTITIONER

## 2025-07-22 PROCEDURE — 1100000001 HC PRIVATE ROOM DAILY

## 2025-07-22 PROCEDURE — 2500000001 HC RX 250 WO HCPCS SELF ADMINISTERED DRUGS (ALT 637 FOR MEDICARE OP): Performed by: HOSPITALIST

## 2025-07-22 PROCEDURE — 2500000002 HC RX 250 W HCPCS SELF ADMINISTERED DRUGS (ALT 637 FOR MEDICARE OP, ALT 636 FOR OP/ED): Performed by: NURSE PRACTITIONER

## 2025-07-22 PROCEDURE — 2500000001 HC RX 250 WO HCPCS SELF ADMINISTERED DRUGS (ALT 637 FOR MEDICARE OP): Performed by: NURSE PRACTITIONER

## 2025-07-22 PROCEDURE — 2500000004 HC RX 250 GENERAL PHARMACY W/ HCPCS (ALT 636 FOR OP/ED): Performed by: NURSE PRACTITIONER

## 2025-07-22 PROCEDURE — 2500000001 HC RX 250 WO HCPCS SELF ADMINISTERED DRUGS (ALT 637 FOR MEDICARE OP): Performed by: STUDENT IN AN ORGANIZED HEALTH CARE EDUCATION/TRAINING PROGRAM

## 2025-07-22 PROCEDURE — 82947 ASSAY GLUCOSE BLOOD QUANT: CPT

## 2025-07-22 PROCEDURE — 97535 SELF CARE MNGMENT TRAINING: CPT | Mod: GO,CO

## 2025-07-22 PROCEDURE — 97530 THERAPEUTIC ACTIVITIES: CPT | Mod: GO,CO

## 2025-07-22 RX ORDER — FUROSEMIDE 10 MG/ML
20 INJECTION INTRAMUSCULAR; INTRAVENOUS ONCE
Status: COMPLETED | OUTPATIENT
Start: 2025-07-22 | End: 2025-07-22

## 2025-07-22 RX ADMIN — PANTOPRAZOLE SODIUM 40 MG: 40 TABLET, DELAYED RELEASE ORAL at 06:18

## 2025-07-22 RX ADMIN — FUROSEMIDE 20 MG: 10 INJECTION, SOLUTION INTRAMUSCULAR; INTRAVENOUS at 13:39

## 2025-07-22 RX ADMIN — AMOXICILLIN AND CLAVULANATE POTASSIUM 1 TABLET: 500; 125 TABLET, FILM COATED ORAL at 21:29

## 2025-07-22 RX ADMIN — APIXABAN 2.5 MG: 2.5 TABLET, FILM COATED ORAL at 21:28

## 2025-07-22 RX ADMIN — LATANOPROST 1 DROP: 50 SOLUTION/ DROPS OPHTHALMIC at 21:29

## 2025-07-22 RX ADMIN — METOPROLOL SUCCINATE 25 MG: 25 TABLET, EXTENDED RELEASE ORAL at 09:08

## 2025-07-22 RX ADMIN — INSULIN LISPRO 4 UNITS: 100 INJECTION, SOLUTION INTRAVENOUS; SUBCUTANEOUS at 13:39

## 2025-07-22 RX ADMIN — Medication 50 MCG: at 09:08

## 2025-07-22 RX ADMIN — INSULIN LISPRO 8 UNITS: 100 INJECTION, SOLUTION INTRAVENOUS; SUBCUTANEOUS at 15:23

## 2025-07-22 RX ADMIN — GLIPIZIDE 5 MG: 2.5 TABLET, EXTENDED RELEASE ORAL at 06:18

## 2025-07-22 RX ADMIN — APIXABAN 2.5 MG: 2.5 TABLET, FILM COATED ORAL at 09:08

## 2025-07-22 RX ADMIN — AMOXICILLIN AND CLAVULANATE POTASSIUM 1 TABLET: 500; 125 TABLET, FILM COATED ORAL at 09:07

## 2025-07-22 RX ADMIN — GLIPIZIDE 5 MG: 2.5 TABLET, EXTENDED RELEASE ORAL at 15:23

## 2025-07-22 RX ADMIN — HYDROCHLOROTHIAZIDE 25 MG: 25 TABLET ORAL at 09:07

## 2025-07-22 RX ADMIN — INSULIN LISPRO 4 UNITS: 100 INJECTION, SOLUTION INTRAVENOUS; SUBCUTANEOUS at 09:07

## 2025-07-22 RX ADMIN — LOSARTAN POTASSIUM 12.5 MG: 25 TABLET, FILM COATED ORAL at 09:07

## 2025-07-22 RX ADMIN — ATORVASTATIN CALCIUM 40 MG: 40 TABLET, FILM COATED ORAL at 21:29

## 2025-07-22 ASSESSMENT — COGNITIVE AND FUNCTIONAL STATUS - GENERAL
DRESSING REGULAR UPPER BODY CLOTHING: A LITTLE
TURNING FROM BACK TO SIDE WHILE IN FLAT BAD: A LITTLE
DAILY ACTIVITIY SCORE: 24
DAILY ACTIVITIY SCORE: 17
HELP NEEDED FOR BATHING: A LOT
MOBILITY SCORE: 18
TOILETING: A LITTLE
MOVING TO AND FROM BED TO CHAIR: A LITTLE
CLIMB 3 TO 5 STEPS WITH RAILING: A LOT
PERSONAL GROOMING: A LITTLE
DRESSING REGULAR LOWER BODY CLOTHING: A LOT
WALKING IN HOSPITAL ROOM: A LITTLE
STANDING UP FROM CHAIR USING ARMS: A LITTLE

## 2025-07-22 ASSESSMENT — PAIN SCALES - GENERAL
PAINLEVEL_OUTOF10: 0 - NO PAIN

## 2025-07-22 ASSESSMENT — PAIN - FUNCTIONAL ASSESSMENT
PAIN_FUNCTIONAL_ASSESSMENT: 0-10

## 2025-07-22 ASSESSMENT — ACTIVITIES OF DAILY LIVING (ADL): HOME_MANAGEMENT_TIME_ENTRY: 10

## 2025-07-22 NOTE — CARE PLAN
The patient's goals for the shift include To get rest and medicine    The clinical goals for the shift include safety and comfort maintained

## 2025-07-22 NOTE — CARE PLAN
The patient's goals for the shift include To get rest and medicine    The clinical goals for the shift include safety    Over the shift, the patient did not make progress toward the following goals. Barriers to progression include . Recommendations to address these barriers include .

## 2025-07-22 NOTE — PROGRESS NOTES
Occupational Therapy    OT Treatment    Patient Name: Mauro Grande  MRN: [id number]  Department: [facility] A 5  Room: [address] [place] [address]  Today's Date: 7/22/2025  Time Calculation  Start Time: 1358  Stop Time: 1424  Time Calculation (min): 26 min        Assessment:  OT Assessment: Pt progressing towards POC with education provided throughout functional tasks on energy conservation techniques.Pt would benefit from continued skilled OT services to improve strength, balance, and functional tolerance to increase independence with ADL tasks  Prognosis: Good  Barriers to Discharge Home: Caregiver assistance  Caregiver Assistance: Caregiver assistance needed per identified barriers - however, level of patient's required assistance exceeds assistance available at home  Evaluation/Treatment Tolerance: Patient tolerated treatment well  End of Session Communication: Bedside nurse  End of Session Patient Position: Up in chair, Alarm on  OT Assessment Results: Decreased ADL status, Decreased upper extremity strength, Decreased safe judgment during ADL, Decreased endurance, Decreased functional mobility, Decreased IADLs  Prognosis: Good  Evaluation/Treatment Tolerance: Patient tolerated treatment well  Plan:  Treatment Interventions: ADL retraining, Functional transfer training, UE strengthening/ROM, Endurance training, Compensatory technique education  OT Frequency: 3 times per week (during this inpatient hospitalization)  OT Discharge Recommendations: Moderate intensity level of continued care (Based on current functional status and rehab potential, patient is anticipated to tolerate and benefit from 5 or more days per week of skilled rehabilitative therapy after discharge from this acute inpatient hospitalization.)  Equipment Recommended upon Discharge: Wheeled walker  OT Recommended Transfer Status: Assist of 1  OT - OK to Discharge: Yes  Treatment Interventions: ADL retraining, Functional transfer training, UE strengthening/ROM, Endurance  training, Compensatory technique education    Subjective   OT Visit Info:  OT Received On: 07/22/25  General Visit Info:  General  Reason for Referral: ED with c/o generalized weakness after fall in bathroom at home, unable to get up from floor  Referred By: Ronda Sanches MD  Past Medical History Relevant to Rehab: Medical History[1]  Prior to Session Communication: Bedside nurse  Patient Position Received: Up in chair, Alarm on  General Comment: Cleared for therapy per RN. Pt seated in chair upon arrival and agreeable to tx, stating that he needs to utilize urine prior to activity with increased time required at beginning of session  Precautions:  Hearing/Visual Limitations: glasses (+); hearing aides (+)  Medical Precautions: Fall precautions    Pain:  Pain Assessment  Pain Assessment: 0-10  0-10 (Numeric) Pain Score: 0 - No pain    Objective    Cognition:  Cognition  Orientation Level: Oriented X4  Insight: Mild  Activities of Daily Living:    Toileting  Toileting Level of Assistance: Close supervision  Where Assessed: Other (Comment)  Toileting Comments: pt utilizing urinal at beginning of session for increased time in sitting, completing pericare hygiene with wipes    Bed Mobility/Transfers:    Transfers  Transfer: Yes  Transfer 1  Technique 1: Sit to stand, Stand to sit  Transfer Device 1: Walker  Transfer Level of Assistance 1: Minimum assistance, Minimal verbal cues  Trials/Comments 1: assist for trunk elevation with cues for proper hand placement, demonstrating decreased eccentric lowering to chair    Functional Mobility:  Functional Mobility  Functional Mobility Performed: Yes  Functional Mobility 1  Device 1: Rolling walker  Assistance 1: Contact guard  Comments 1: participated in functional mobility extended household distance at FWW with cues for pacing for increased safety awareness with intermittent standing rest breaks required d/t fatigue with additional cues for use of pursed lip breathing,  demonstrating fair- balance throughout task    Standing Balance:  Dynamic Standing Balance  Dynamic Standing-Level of Assistance: Contact guard  Dynamic Standing-Comments: fair- balance during functional mobility task    Therapy/Activity:    Therapeutic Activity  Therapeutic Activity Performed: Yes  Therapeutic Activity 1: pt participating in functional mobility task for extended time this date, requesting to go further distances with intermittent rest breaks required; Education provided throughout on energy conservation techniques in effort to increase functional tolerance    Outcome Measures:[facility] Daily Activity  Putting on and taking off regular lower body clothing: A lot  Bathing (including washing, rinsing, drying): A lot  Putting on and taking off regular upper body clothing: A little  Toileting, which includes using toilet, bedpan or urinal: A little  Taking care of personal grooming such as brushing teeth: A little  Eating Meals: None  Daily Activity - Total Score: 17     And Modified Barthel Index (Mendoza Version): completed on 7/20; score 61.    Education Documentation  Handouts, taught by ROSA Solomon at 7/22/2025  3:22 PM.  Learner: Patient  Readiness: Acceptance  Method: Explanation  Response: Verbalizes Understanding, Needs Reinforcement    Body Mechanics, taught by ROSA Solomon at 7/22/2025  3:22 PM.  Learner: Patient  Readiness: Acceptance  Method: Explanation  Response: Verbalizes Understanding, Needs Reinforcement    Home Exercise Program, taught by ROSA Solomon at 7/22/2025  3:22 PM.  Learner: Patient  Readiness: Acceptance  Method: Explanation  Response: Verbalizes Understanding, Needs Reinforcement    ADL Training, taught by ROSA Solomon at 7/22/2025  3:22 PM.  Learner: Patient  Readiness: Acceptance  Method: Explanation  Response: Verbalizes Understanding, Needs Reinforcement    Education Comments  No comments found.        OP  EDUCATION:       Goals:  Encounter Problems       Encounter Problems (Active)       ADLs       Patient will perform UB and LB bathing with modified independent level of assistance. (Progressing)       Start:  07/20/25    Expected End:  08/03/25            Patient with complete upper body dressing with modified independent level of assistance donning and doffing all UE clothes. (Progressing)       Start:  07/20/25    Expected End:  08/03/25            Patient with complete lower body dressing with modified independent level of assistance donning and doffing all LE clothes. (Progressing)       Start:  07/20/25    Expected End:  08/03/25            Patient will complete daily grooming tasks brushing teeth with modified independent level of assistance and PRN adaptive equipment while standing. (Progressing)       Start:  07/20/25    Expected End:  08/03/25            Patient will complete toileting including hygiene clothing management/hygiene with modified independent level of assistance. (Progressing)       Start:  07/20/25    Expected End:  08/03/25               MOBILITY       Patient will perform Functional mobility max Household distances/Community Distances with stand by assist level of assistance and front wheeled walker in order to improve safety and functional mobility. (Progressing)       Start:  07/20/25    Expected End:  08/03/25               TRANSFERS       Patient will complete sit to stand transfer with stand by assist level of assistance and front wheeled walker in order to improve safety and prepare for out of bed mobility. (Progressing)       Start:  07/20/25    Expected End:  08/03/25                                  [1]   Past Medical History:  Diagnosis Date    Diabetes mellitus (Multi)     Hyperlipemia     Hypertension

## 2025-07-22 NOTE — PROGRESS NOTES
"   Mauro Grande is a 85 y.o. male on day 4 of admission presenting with Sepsis, due to unspecified organism, unspecified whether acute organ dysfunction present (Multi).    Subjective      Doing ok  Sitting in chair  Feels ok  Sli  shortness of breath   No chest pain   Mild wheezing       Objective     Physical Exam  Constitutional:       Appearance: Normal appearance.   HENT:      Mouth/Throat:      Mouth: Mucous membranes are moist.     Cardiovascular:      Rate and Rhythm: Regular rhythm.   Pulmonary:      Effort: Pulmonary effort is normal.      Breath sounds: Normal breath sounds.   Abdominal:      Palpations: Abdomen is soft.     Musculoskeletal:         General: Normal range of motion.     Skin:     General: Skin is warm and dry.     Neurological:      General: No focal deficit present.      Mental Status: He is alert and oriented to person, place, and time.      Comments: Answers all questions  Aware of events prior to hospitalization        Last Recorded Vitals  Blood pressure 125/65, pulse 93, temperature 36.7 °C (98.1 °F), temperature source Temporal, resp. rate 16, height 1.753 m (5' 9\"), weight 96.6 kg (212 lb 15.4 oz), SpO2 93%.  Intake/Output last 3 Shifts:  I/O last 3 completed shifts:  In: 360 (3.7 mL/kg) [P.O.:360]  Out: 2600 (26.9 mL/kg) [Urine:2600 (0.7 mL/kg/hr)]  Weight: 96.6 kg     Relevant Results  Results for orders placed or performed during the hospital encounter of 07/18/25 (from the past 24 hours)   POCT GLUCOSE   Result Value Ref Range    POCT Glucose 241 (H) 74 - 99 mg/dL   POCT GLUCOSE   Result Value Ref Range    POCT Glucose 195 (H) 74 - 99 mg/dL   Basic Metabolic Panel   Result Value Ref Range    Glucose 156 (H) 74 - 99 mg/dL    Sodium 137 136 - 145 mmol/L    Potassium 3.9 3.5 - 5.3 mmol/L    Chloride 103 98 - 107 mmol/L    Bicarbonate 22 21 - 32 mmol/L    Anion Gap 16 10 - 20 mmol/L    Urea Nitrogen 28 (H) 6 - 23 mg/dL    Creatinine 1.84 (H) 0.50 - 1.30 mg/dL    eGFR 36 (L) >60 " mL/min/1.73m*2    Calcium 9.3 8.6 - 10.3 mg/dL   CBC and Auto Differential   Result Value Ref Range    WBC 12.7 (H) 4.4 - 11.3 x10*3/uL    nRBC 0.0 0.0 - 0.0 /100 WBCs    RBC 4.64 4.50 - 5.90 x10*6/uL    Hemoglobin 14.2 13.5 - 17.5 g/dL    Hematocrit 41.9 41.0 - 52.0 %    MCV 90 80 - 100 fL    MCH 30.6 26.0 - 34.0 pg    MCHC 33.9 32.0 - 36.0 g/dL    RDW 13.9 11.5 - 14.5 %    Platelets 189 150 - 450 x10*3/uL    Neutrophils % 47.4 40.0 - 80.0 %    Immature Granulocytes %, Automated 0.6 0.0 - 0.9 %    Lymphocytes % 39.7 13.0 - 44.0 %    Monocytes % 7.9 2.0 - 10.0 %    Eosinophils % 3.8 0.0 - 6.0 %    Basophils % 0.6 0.0 - 2.0 %    Neutrophils Absolute 6.04 (H) 1.60 - 5.50 x10*3/uL    Immature Granulocytes Absolute, Automated 0.07 0.00 - 0.50 x10*3/uL    Lymphocytes Absolute 5.04 (H) 0.80 - 3.00 x10*3/uL    Monocytes Absolute 1.00 (H) 0.05 - 0.80 x10*3/uL    Eosinophils Absolute 0.48 (H) 0.00 - 0.40 x10*3/uL    Basophils Absolute 0.08 0.00 - 0.10 x10*3/uL   Magnesium   Result Value Ref Range    Magnesium 1.71 1.60 - 2.40 mg/dL   Hepatic Function Panel   Result Value Ref Range    Albumin 3.6 3.4 - 5.0 g/dL    Bilirubin, Total 1.0 0.0 - 1.2 mg/dL    Bilirubin, Direct 0.2 0.0 - 0.3 mg/dL    Alkaline Phosphatase 76 33 - 136 U/L    ALT 25 10 - 52 U/L    AST 23 9 - 39 U/L    Total Protein 6.5 6.4 - 8.2 g/dL   POCT GLUCOSE   Result Value Ref Range    POCT Glucose 201 (H) 74 - 99 mg/dL      XR chest 2 views   Final Result   1. No acute infiltrates   2. Questionable 7 mm left apical nodule. PA view of the chest   recommended for complete evaluation.             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        No acute fracture or dislocation.        Prominent posterior arterial calcifications.        MACRO:   None        Signed by: Eligio Watson 7/21/2025 8:10 AM   Dictation  workstation:   [id number]      Transthoracic Echo Complete   Final Result      XR chest 1 view   Final Result   1. No discrete consolidation.             I personally reviewed the images/study, and I agree with the findings   as stated above by resident physician Dr. Josefina Patton MD. This   study was interpreted at [facility], [place], [place].        MACRO:   None        Signed by: Fabi Paula 7/18/2025 12:35 PM   Dictation workstation:   [id number]      CT head wo IV contrast   Final Result   1. No acute intracranial abnormality identified.   2. Chronic white matter changes, likely sequela of small-vessel   ischemic disease.                  MACRO:   None        Signed by: Ryland Martin 7/18/2025 1:14 AM   Dictation workstation:   [id number]         85 y.o. male with PMH of DMT2, HLD, HTN, CKD (baseline SCr 1.9-2.2, follows with Dr. Kline), CAD s/p CABG, PAF on Eliquis, presented with generalized weakness, recent confusion as reported by family.   Workup notable for mild tachycardia (A-fib 109 bpm on EKG), no acute ischemia), tachypnea, BP overall stable aside an isolated reading of 89/66, labs with baseline BUN/creatinine elevation, mildly elevated total bili 1.3 with stable LFTs otherwise lactate elevated 2.6/3.1/2.7, leukocytosis 22.5 (neutrophil/lymphocyte predominant), UA consistent with UTI, urine and blood cultures collected; CT head with chronic white matter changes, probable sequela of small vessel ischemic disease, no acute findings.    Assessment & Plan    Sepsis (UTI, leukocytosis, tachycardia, lactic acidosis, encephalopathy)   -afebrile, lactate normalized, mentation at baseline, has persistent wbc elevation today   -continue zosyn, FU bld NTD  and Ucx   - ID consulted Based on klebsiella sensitivies, ok to switch to PO augmentin 500/125 BID through end date 7/24/25      Exp wheezing on exam  S/p large volume IVF resuscitation   Hx CAD   Hx VARGAS for  months, occasional CP  -stop further IVF, check bnp 398 > 269   Can give small dose lasix and monitor         DMT2 A1c 9.4 April'25  -metformin on hold, glipizide dose reduced for now, corrective scale Insulin in place, update A1c 8.6      CKD  -at baseline      A-Fib   -continue eliquis, BB     HypoK  better     HTN  Isolated low BP  -monitor on home meds      HLD  -continue statin      VTE / GI prophylaxis   -on eliquis, PPI, bowel regimen in place      Discharge planning  -PT/OT      Plan of care discussed with: Provider, RN, Patient    Patient case and plan of care discussed with Dr. ANGIE Vasquez.    DANIELA Botello - CNP  -In collaboration with Dr. ANGIE Vasquez    [facility]  Office: [phone]  Fax: [phone]  I have reviewed the above note obtained and documented by the NP/PA and I personally participated in the key components. I have discussed the case and management of the patient's care. Changes made to the note, and all key components of history and physical/progress note done by me.   nursing  Larry Vasquez MD    .

## 2025-07-23 ENCOUNTER — APPOINTMENT (OUTPATIENT)
Dept: RADIOLOGY | Facility: HOSPITAL | Age: 85
DRG: 872 | End: 2025-07-23
Payer: MEDICARE

## 2025-07-23 VITALS
WEIGHT: 187.39 LBS | RESPIRATION RATE: 20 BRPM | TEMPERATURE: 98.1 F | HEIGHT: 69 IN | BODY MASS INDEX: 27.76 KG/M2 | HEART RATE: 99 BPM | OXYGEN SATURATION: 96 % | DIASTOLIC BLOOD PRESSURE: 68 MMHG | SYSTOLIC BLOOD PRESSURE: 111 MMHG

## 2025-07-23 LAB
ANION GAP SERPL CALC-SCNC: 17 MMOL/L (ref 10–20)
BASOPHILS # BLD AUTO: 0.1 X10*3/UL (ref 0–0.1)
BASOPHILS NFR BLD AUTO: 0.7 %
BNP SERPL-MCNC: 105 PG/ML (ref 0–99)
BUN SERPL-MCNC: 34 MG/DL (ref 6–23)
CALCIUM SERPL-MCNC: 9.2 MG/DL (ref 8.6–10.3)
CHLORIDE SERPL-SCNC: 102 MMOL/L (ref 98–107)
CO2 SERPL-SCNC: 24 MMOL/L (ref 21–32)
CREAT SERPL-MCNC: 2.07 MG/DL (ref 0.5–1.3)
EGFRCR SERPLBLD CKD-EPI 2021: 31 ML/MIN/1.73M*2
EOSINOPHIL # BLD AUTO: 0.52 X10*3/UL (ref 0–0.4)
EOSINOPHIL NFR BLD AUTO: 3.9 %
ERYTHROCYTE [DISTWIDTH] IN BLOOD BY AUTOMATED COUNT: 13.7 % (ref 11.5–14.5)
GLUCOSE BLD MANUAL STRIP-MCNC: 189 MG/DL (ref 74–99)
GLUCOSE BLD MANUAL STRIP-MCNC: 330 MG/DL (ref 74–99)
GLUCOSE SERPL-MCNC: 152 MG/DL (ref 74–99)
HCT VFR BLD AUTO: 45.1 % (ref 41–52)
HGB BLD-MCNC: 14.3 G/DL (ref 13.5–17.5)
IMM GRANULOCYTES # BLD AUTO: 0.09 X10*3/UL (ref 0–0.5)
IMM GRANULOCYTES NFR BLD AUTO: 0.7 % (ref 0–0.9)
LYMPHOCYTES # BLD AUTO: 5.28 X10*3/UL (ref 0.8–3)
LYMPHOCYTES NFR BLD AUTO: 39.5 %
MAGNESIUM SERPL-MCNC: 1.89 MG/DL (ref 1.6–2.4)
MCH RBC QN AUTO: 29.5 PG (ref 26–34)
MCHC RBC AUTO-ENTMCNC: 31.7 G/DL (ref 32–36)
MCV RBC AUTO: 93 FL (ref 80–100)
MONOCYTES # BLD AUTO: 1 X10*3/UL (ref 0.05–0.8)
MONOCYTES NFR BLD AUTO: 7.5 %
NEUTROPHILS # BLD AUTO: 6.39 X10*3/UL (ref 1.6–5.5)
NEUTROPHILS NFR BLD AUTO: 47.7 %
NRBC BLD-RTO: 0 /100 WBCS (ref 0–0)
PLATELET # BLD AUTO: 207 X10*3/UL (ref 150–450)
POTASSIUM SERPL-SCNC: 3.6 MMOL/L (ref 3.5–5.3)
RBC # BLD AUTO: 4.84 X10*6/UL (ref 4.5–5.9)
SODIUM SERPL-SCNC: 139 MMOL/L (ref 136–145)
WBC # BLD AUTO: 13.4 X10*3/UL (ref 4.4–11.3)

## 2025-07-23 PROCEDURE — 71046 X-RAY EXAM CHEST 2 VIEWS: CPT

## 2025-07-23 PROCEDURE — 83735 ASSAY OF MAGNESIUM: CPT | Performed by: NURSE PRACTITIONER

## 2025-07-23 PROCEDURE — 71046 X-RAY EXAM CHEST 2 VIEWS: CPT | Performed by: RADIOLOGY

## 2025-07-23 PROCEDURE — 71250 CT THORAX DX C-: CPT

## 2025-07-23 PROCEDURE — 82947 ASSAY GLUCOSE BLOOD QUANT: CPT

## 2025-07-23 PROCEDURE — 71250 CT THORAX DX C-: CPT | Performed by: RADIOLOGY

## 2025-07-23 PROCEDURE — 80048 BASIC METABOLIC PNL TOTAL CA: CPT | Performed by: NURSE PRACTITIONER

## 2025-07-23 PROCEDURE — 2500000001 HC RX 250 WO HCPCS SELF ADMINISTERED DRUGS (ALT 637 FOR MEDICARE OP): Performed by: HOSPITALIST

## 2025-07-23 PROCEDURE — 85025 COMPLETE CBC W/AUTO DIFF WBC: CPT | Performed by: NURSE PRACTITIONER

## 2025-07-23 PROCEDURE — 2500000001 HC RX 250 WO HCPCS SELF ADMINISTERED DRUGS (ALT 637 FOR MEDICARE OP): Performed by: STUDENT IN AN ORGANIZED HEALTH CARE EDUCATION/TRAINING PROGRAM

## 2025-07-23 PROCEDURE — 83880 ASSAY OF NATRIURETIC PEPTIDE: CPT | Performed by: NURSE PRACTITIONER

## 2025-07-23 PROCEDURE — 36415 COLL VENOUS BLD VENIPUNCTURE: CPT | Performed by: NURSE PRACTITIONER

## 2025-07-23 PROCEDURE — 99239 HOSP IP/OBS DSCHRG MGMT >30: CPT | Performed by: NURSE PRACTITIONER

## 2025-07-23 PROCEDURE — 2500000002 HC RX 250 W HCPCS SELF ADMINISTERED DRUGS (ALT 637 FOR MEDICARE OP, ALT 636 FOR OP/ED): Performed by: NURSE PRACTITIONER

## 2025-07-23 PROCEDURE — 2500000001 HC RX 250 WO HCPCS SELF ADMINISTERED DRUGS (ALT 637 FOR MEDICARE OP): Performed by: NURSE PRACTITIONER

## 2025-07-23 RX ORDER — AMOXICILLIN AND CLAVULANATE POTASSIUM 500; 125 MG/1; MG/1
1 TABLET, FILM COATED ORAL 2 TIMES DAILY
Start: 2025-07-23 | End: 2025-07-24

## 2025-07-23 RX ORDER — ALBUTEROL SULFATE 0.83 MG/ML
2.5 SOLUTION RESPIRATORY (INHALATION) EVERY 6 HOURS PRN
Start: 2025-07-23 | End: 2025-07-23

## 2025-07-23 RX ORDER — ALBUTEROL SULFATE 0.83 MG/ML
2.5 SOLUTION RESPIRATORY (INHALATION) EVERY 6 HOURS PRN
Status: DISCONTINUED | OUTPATIENT
Start: 2025-07-23 | End: 2025-07-23

## 2025-07-23 RX ORDER — ALBUTEROL SULFATE 0.83 MG/ML
2.5 SOLUTION RESPIRATORY (INHALATION) EVERY 2 HOUR PRN
Status: DISCONTINUED | OUTPATIENT
Start: 2025-07-23 | End: 2025-07-23 | Stop reason: HOSPADM

## 2025-07-23 RX ORDER — INSULIN LISPRO 100 [IU]/ML
0-10 INJECTION, SOLUTION INTRAVENOUS; SUBCUTANEOUS
Start: 2025-07-23

## 2025-07-23 RX ORDER — GLIPIZIDE 5 MG/1
5 TABLET, FILM COATED, EXTENDED RELEASE ORAL DAILY
Start: 2025-07-23 | End: 2025-07-23

## 2025-07-23 RX ORDER — ALBUTEROL SULFATE 0.83 MG/ML
2.5 SOLUTION RESPIRATORY (INHALATION) EVERY 4 HOURS PRN
Start: 2025-07-23

## 2025-07-23 RX ORDER — GLIPIZIDE 5 MG/1
5 TABLET, FILM COATED, EXTENDED RELEASE ORAL 2 TIMES DAILY
Start: 2025-07-23 | End: 2025-07-23 | Stop reason: HOSPADM

## 2025-07-23 RX ADMIN — PANTOPRAZOLE SODIUM 40 MG: 40 TABLET, DELAYED RELEASE ORAL at 09:09

## 2025-07-23 RX ADMIN — HYDROCHLOROTHIAZIDE 25 MG: 25 TABLET ORAL at 09:09

## 2025-07-23 RX ADMIN — AMOXICILLIN AND CLAVULANATE POTASSIUM 1 TABLET: 500; 125 TABLET, FILM COATED ORAL at 09:09

## 2025-07-23 RX ADMIN — GLIPIZIDE 5 MG: 2.5 TABLET, EXTENDED RELEASE ORAL at 16:58

## 2025-07-23 RX ADMIN — INSULIN LISPRO 2 UNITS: 100 INJECTION, SOLUTION INTRAVENOUS; SUBCUTANEOUS at 09:09

## 2025-07-23 RX ADMIN — APIXABAN 2.5 MG: 2.5 TABLET, FILM COATED ORAL at 09:09

## 2025-07-23 RX ADMIN — GLIPIZIDE 5 MG: 2.5 TABLET, EXTENDED RELEASE ORAL at 09:09

## 2025-07-23 RX ADMIN — INSULIN LISPRO 8 UNITS: 100 INJECTION, SOLUTION INTRAVENOUS; SUBCUTANEOUS at 12:42

## 2025-07-23 RX ADMIN — LOSARTAN POTASSIUM 12.5 MG: 25 TABLET, FILM COATED ORAL at 09:09

## 2025-07-23 RX ADMIN — METOPROLOL SUCCINATE 25 MG: 25 TABLET, EXTENDED RELEASE ORAL at 09:09

## 2025-07-23 RX ADMIN — Medication 50 MCG: at 09:09

## 2025-07-23 ASSESSMENT — PAIN SCALES - GENERAL
PAINLEVEL_OUTOF10: 0 - NO PAIN

## 2025-07-23 ASSESSMENT — PAIN - FUNCTIONAL ASSESSMENT
PAIN_FUNCTIONAL_ASSESSMENT: 0-10
PAIN_FUNCTIONAL_ASSESSMENT: 0-10

## 2025-07-23 NOTE — PROGRESS NOTES
07/23/25 1110   Discharge Planning   Expected Discharge Disposition SNF  ([facility])   Does the patient need discharge transport arranged? No   [facility] coordination needed? No   Has discharge transport been arranged? Yes   What day is the transport expected? 07/23/25   What time is the transport expected? 1700       11:10am  SW attempted to contact patient's wife to inform her of the transportation time change to 5:00pm however wife did not answer and her voicemail box was full.    11:14am   SW was able to contact patient's wife at [phone] and inform her of the transport time change.

## 2025-07-23 NOTE — CARE PLAN
The patient's goals for the shift include To get rest and medicine    The clinical goals for the shift include patient will remain free from falls and injuy      Problem: Pain - Adult  Goal: Verbalizes/displays adequate comfort level or baseline comfort level  Outcome: Progressing     Problem: Safety - Adult  Goal: Free from fall injury  Outcome: Met     Problem: Discharge Planning  Goal: Discharge to home or other facility with appropriate resources  Outcome: Met     Problem: Chronic Conditions and Co-morbidities  Goal: Patient's chronic conditions and co-morbidity symptoms are monitored and maintained or improved  Outcome: Progressing     Problem: Nutrition  Goal: Nutrient intake appropriate for maintaining nutritional needs  Outcome: Progressing

## 2025-07-23 NOTE — PROGRESS NOTES
Physical Therapy                 Therapy Communication Note    Patient Name: Mauro Grande  MRN: [id number]  Department: [place] [address]  Room: [address] [place], [place] [address]  Today's Date: 7/23/2025     Discipline: Physical Therapy    Missed Visit: PT Missed Visit: Yes     Missed Visit Reason: Missed Visit Reason: Other (Comment) (Pt scheduled to d/c at 1700 this date - no acute PT needs required at this time.)    Missed Time: Attempt    Comment:

## 2025-07-23 NOTE — DISCHARGE SUMMARY
Discharge Diagnosis  UTI  Sepsis     Issues requiring follow up  Jardiance held 2/2 UTI, reassess at follow up     Discharge Meds     Medication List      START taking these medications     albuterol 2.5 mg /3 mL (0.083 %) nebulizer solution; Take 3 mL (2.5 mg)   by nebulization every 4 hours if needed for wheezing.   amoxicillin-clavulanate 500-125 mg tablet; Commonly known as: Augmentin;   Take 1 tablet by mouth 2 times a day for 1 day. Through 7/24/25   insulin lispro 100 unit/mL injection; Inject 0-10 Units under the skin 3   times a day before meals. Take as directed per insulin instructions.     CONTINUE taking these medications     atorvastatin 40 mg tablet; Commonly known as: Lipitor; TAKE 1 TABLET BY   MOUTH ONCE  DAILY   cholecalciferol 50 mcg (2,000 units) tablet; Commonly known as: Vitamin   D-3   Eliquis 2.5 mg tablet; Generic drug: apixaban; TAKE 1 TABLET BY MOUTH   TWICE  DAILY   glipiZIDE XL 10 mg 24 hr tablet; Commonly known as: Glucotrol XL; Take 1   tablet (10 mg) by mouth 2 times a day before meals.   hydroCHLOROthiazide 25 mg tablet; Commonly known as: HYDRODiuril; TAKE 1   TABLET BY MOUTH ONCE  DAILY   latanoprost 0.005 % ophthalmic solution; Commonly known as: Xalatan   losartan 25 mg tablet; Commonly known as: Cozaar   metFORMIN  mg 24 hr tablet; Commonly known as: Glucophage-XR; Take   2 tablets (1,000 mg) by mouth once daily with breakfast. Do not crush,   chew, or split.   metoprolol succinate XL 25 mg 24 hr tablet; Commonly known as:   Toprol-XL; Take 1 tablet (25 mg) by mouth once daily.   omeprazole 20 mg DR capsule; Commonly known as: PriLOSEC     STOP taking these medications     empagliflozin 25 mg tablet; Commonly known as: Jardiance     Test Results Pending At Discharge  Pending Labs       Order Current Status    Extra Urine Gray Tube Collected (07/18/25 0138)    Urinalysis with Reflex Culture and Microscopic In process          Hospital Course  85 y.o. male with PMH of DMT2,  HLD, HTN, CKD (baseline SCr 1.9-2.2, follows with Dr. Kline), CAD s/p CABG, PAF on Eliquis, presented with generalized weakness, recent confusion as reported by family.   Workup notable for mild tachycardia (A-fib 109 bpm on EKG), no acute ischemia), tachypnea, BP overall stable aside an isolated reading of 89/66, labs with baseline BUN/creatinine elevation, mildly elevated total bili 1.3 with stable LFTs otherwise lactate elevated 2.6/3.1/2.7, leukocytosis 22.5 (neutrophil/lymphocyte predominant), UA consistent with UTI, urine and blood cultures collected; CT head with chronic white matter changes, probable sequela of small vessel ischemic disease, no acute findings.  Admitted and initially treated with IV zosyn, urine cx with klebsiella, transitioned to PO augmentin 500/125 BID through end date 7/24/25.   Received IV Lasix x 1 for wheezing on exam, s/p large volume IVF resuscitation, TTE stable/improved EF 61%.   CT chest done for pulmonary nodule seen on CXR, benign findings.  Blood glucose elevated, A1c improved from 9.4 in April'25 to 8.6, will continue Glipizide and Metformin with caution given CKD, Jardiance held 2/2 UTI - reassess at follow up, continue corrective scale Insulin fo now while at SNF.   Cleared for discharge to SNF.     More than 30 minutes spent coordinating this discharge and preparing paperwork     Pertinent Physical Exam At Time of Discharge  Constitutional: NAD, alert and cooperative  Eyes: no icterus  ENMT: mucous membranes moist, no lesions  Head/Neck: supple, no JVD  Respiratory/Thorax: upper airway expiratory wheezing bilaterally, non-labored breathing at rest, no cough, on RA  Cardiovascular: irregular, no murmurs heard  Gastrointestinal: ND/S/NT  : no Garcia, no SP/flank discomfort  Musculoskeletal: no joint swelling, ROM intact  Extremities: trace BLE edema  Neurological: non-focal  Skin: warm and dry  Psych: calm, stable mood     Outpatient Follow-Up  Future Appointments   Date  Time Provider Department [place]   8/1/2025  1:00 PM Veena Santacruz, PharmD [id number] [facility]   10/6/2025  2:20 PM Smith Aden MD [id number] [facility]   1/7/2026 11:40 AM Bruce Kline MD [id number] [facility]       Fiordaliza Middleton, APRN-CNP  agree wtih discharge summary , reviewd and dw NP  Larry Vasquez MD

## 2025-07-23 NOTE — CARE PLAN
The patient's goals for the shift include To get rest and medicine    The clinical goals for the shift include Comfort and Safety    Problem: Pain - Adult  Goal: Verbalizes/displays adequate comfort level or baseline comfort level  Outcome: Progressing     Problem: Safety - Adult  Goal: Free from fall injury  Outcome: Progressing     Problem: Discharge Planning  Goal: Discharge to home or other facility with appropriate resources  Outcome: Progressing     Problem: Skin  Goal: Participates in plan/prevention/treatment measures  Outcome: Progressing  Flowsheets (Taken 7/21/2025 1120 by Christal Reed LPN)  Participates in plan/prevention/treatment measures: Increase activity/out of bed for meals  Goal: Prevent/manage excess moisture  Outcome: Progressing  Flowsheets (Taken 7/22/2025 2301)  Prevent/manage excess moisture:   Cleanse incontinence/protect with barrier cream   Moisturize dry skin

## 2025-07-23 NOTE — PROGRESS NOTES
07/23/25 1028   Discharge Planning   Home or Post Acute Services Post acute facilities (Rehab/SNF/etc)   Type of Post Acute Facility Services Skilled nursing   Expected Discharge Disposition Home H  ([facility])   Does the patient need discharge transport arranged? Yes   [facility] coordination needed? Yes   Has discharge transport been arranged? Yes   What day is the transport expected? 07/23/25   What time is the transport expected? 1130     Wife Yessy notified by phone of  time.

## 2025-07-23 NOTE — PROGRESS NOTES
"     Mauro Grande is a 85 y.o. male on day 5 of admission presenting with Sepsis, due to unspecified organism, unspecified whether acute organ dysfunction present (Multi).    Subjective      Doing ok  Sitting in chair  Feels ok  Sli  shortness of breath   No chest pain   Mild wheezing       Objective     Physical Exam  Constitutional:       Appearance: Normal appearance.   HENT:      Mouth/Throat:      Mouth: Mucous membranes are moist.     Cardiovascular:      Rate and Rhythm: Regular rhythm.   Pulmonary:      Effort: Pulmonary effort is normal.      Breath sounds: Normal breath sounds.   Abdominal:      Palpations: Abdomen is soft.     Musculoskeletal:         General: Normal range of motion.     Skin:     General: Skin is warm and dry.     Neurological:      General: No focal deficit present.      Mental Status: He is alert and oriented to person, place, and time.      Comments: Answers all questions  Aware of events prior to hospitalization        Last Recorded Vitals  Blood pressure 104/71, pulse 90, temperature 36.5 °C (97.7 °F), temperature source Temporal, resp. rate 18, height 1.753 m (5' 9\"), weight 85 kg (187 lb 6.3 oz), SpO2 97%.  Intake/Output last 3 Shifts:  I/O last 3 completed shifts:  In: 400 (4.1 mL/kg) [P.O.:400]  Out: 2451 (25.4 mL/kg) [Urine:2450 (0.7 mL/kg/hr); Emesis/NG output:1]  Weight: 96.6 kg     Relevant Results  Results for orders placed or performed during the hospital encounter of 07/18/25 (from the past 24 hours)   POCT GLUCOSE   Result Value Ref Range    POCT Glucose 311 (H) 74 - 99 mg/dL   POCT GLUCOSE   Result Value Ref Range    POCT Glucose 212 (H) 74 - 99 mg/dL   Basic Metabolic Panel   Result Value Ref Range    Glucose 152 (H) 74 - 99 mg/dL    Sodium 139 136 - 145 mmol/L    Potassium 3.6 3.5 - 5.3 mmol/L    Chloride 102 98 - 107 mmol/L    Bicarbonate 24 21 - 32 mmol/L    Anion Gap 17 10 - 20 mmol/L    Urea Nitrogen 34 (H) 6 - 23 mg/dL    Creatinine 2.07 (H) 0.50 - 1.30 mg/dL "    eGFR 31 (L) >60 mL/min/1.73m*2    Calcium 9.2 8.6 - 10.3 mg/dL   CBC and Auto Differential   Result Value Ref Range    WBC 13.4 (H) 4.4 - 11.3 x10*3/uL    nRBC 0.0 0.0 - 0.0 /100 WBCs    RBC 4.84 4.50 - 5.90 x10*6/uL    Hemoglobin 14.3 13.5 - 17.5 g/dL    Hematocrit 45.1 41.0 - 52.0 %    MCV 93 80 - 100 fL    MCH 29.5 26.0 - 34.0 pg    MCHC 31.7 (L) 32.0 - 36.0 g/dL    RDW 13.7 11.5 - 14.5 %    Platelets 207 150 - 450 x10*3/uL    Neutrophils % 47.7 40.0 - 80.0 %    Immature Granulocytes %, Automated 0.7 0.0 - 0.9 %    Lymphocytes % 39.5 13.0 - 44.0 %    Monocytes % 7.5 2.0 - 10.0 %    Eosinophils % 3.9 0.0 - 6.0 %    Basophils % 0.7 0.0 - 2.0 %    Neutrophils Absolute 6.39 (H) 1.60 - 5.50 x10*3/uL    Immature Granulocytes Absolute, Automated 0.09 0.00 - 0.50 x10*3/uL    Lymphocytes Absolute 5.28 (H) 0.80 - 3.00 x10*3/uL    Monocytes Absolute 1.00 (H) 0.05 - 0.80 x10*3/uL    Eosinophils Absolute 0.52 (H) 0.00 - 0.40 x10*3/uL    Basophils Absolute 0.10 0.00 - 0.10 x10*3/uL   Magnesium   Result Value Ref Range    Magnesium 1.89 1.60 - 2.40 mg/dL   B-type natriuretic peptide   Result Value Ref Range     (H) 0 - 99 pg/mL   POCT GLUCOSE   Result Value Ref Range    POCT Glucose 330 (H) 74 - 99 mg/dL      XR chest 2 views   Final Result   7 mm nodule again noted. It is uncertain if this is related to the   chest wall/rib or a true parenchymal nodule. It could be neoplastic.   Either CT of the thorax can be performed or potentially apical   lordotic views to determine chest wall versus parenchymal.        MACRO:   Critical Finding:  See findings. Notification was initiated on   7/23/2025 at 11:57 am by  José Luis Paula.  (**-YCF-**) Instructions:   See Findings.        Signed by: José Luis Paula 7/23/2025 11:57 AM   Dictation workstation:   [id number]      XR chest 2 views   Final Result   1. No acute infiltrates   2. Questionable 7 mm left apical nodule. PA view of the chest   recommended for complete evaluation.              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        No acute fracture or dislocation.        Prominent posterior arterial calcifications.        MACRO:   None        Signed by: Eligio Watsno 7/21/2025 8:10 AM   Dictation workstation:   [id number]      Transthoracic Echo Complete   Final Result      XR chest 1 view   Final Result   1. No discrete consolidation.             I personally reviewed the images/study, and I agree with the findings   as stated above by resident physician Dr. Josefina Patton MD. This   study was interpreted at [facility], [place], [place].        MACRO:   None        Signed by: Fabi Paula 7/18/2025 12:35 PM   Dictation workstation:   [id number]      CT head wo IV contrast   Final Result   1. No acute intracranial abnormality identified.   2. Chronic white matter changes, likely sequela of small-vessel   ischemic disease.                  MACRO:   None        Signed by: Ryland Martin 7/18/2025 1:14 AM   Dictation workstation:   [id number]      CT chest wo IV contrast    (Results Pending)      85 y.o. male with PMH of DMT2, HLD, HTN, CKD (baseline SCr 1.9-2.2, follows with Dr. Kline), CAD s/p CABG, PAF on Eliquis, presented with generalized weakness, recent confusion as reported by family.   Workup notable for mild tachycardia (A-fib 109 bpm on EKG), no acute ischemia), tachypnea, BP overall stable aside an isolated reading of 89/66, labs with baseline BUN/creatinine elevation, mildly elevated total bili 1.3 with stable LFTs otherwise lactate elevated 2.6/3.1/2.7, leukocytosis 22.5 (neutrophil/lymphocyte predominant), UA consistent with UTI, urine and blood cultures collected; CT head with chronic white matter changes, probable sequela of small vessel ischemic disease, no acute  findings.    Assessment & Plan    Sepsis (UTI, leukocytosis, tachycardia, lactic acidosis, encephalopathy)   -afebrile, lactate normalized, mentation at baseline, has persistent wbc elevation today   -continue zosyn, FU bld NTD  and Ucx   - ID consulted Based on klebsiella sensitivies, ok to switch to PO augmentin 500/125 BID through end date 7/24/25      Exp wheezing on exam  S/p large volume IVF resuscitation   Hx CAD   Hx VARGAS for months, occasional CP  -stop further IVF, check bnp 398 > 269   Can give small dose lasix and monitor         DMT2 A1c 9.4 April'25  -metformin on hold, glipizide dose reduced for now, corrective scale Insulin in place, update A1c 8.6      CKD  -at baseline      A-Fib   -continue eliquis, BB     HypoK  better     HTN  Isolated low BP  -monitor on home meds      HLD  -continue statin      VTE / GI prophylaxis   -on eliquis, PPI, bowel regimen in place      Discharge planning  -PT/OT      Plan of care discussed with: Provider, RN, Patient    Patient case and plan of care discussed with Dr. ANGIE Vasquez.    DANIELA Botello - CNP  -In collaboration with Dr. ANGIE Vasquez    [facility]  Office: [phone]  Fax: [phone]      Pt seen by me this afternoon  No ches t pain  No shortness of breath   No wheezing   CT scan reviewed and anjelica willson on phone   Can repeat CT chest in 6 months  Otherwise will plan on dc to snf today   Gold from signed  Time > 30 min in discharge planning  Updated wife on phone    Larry Vasquez MD

## 2025-08-01 ENCOUNTER — APPOINTMENT (OUTPATIENT)
Dept: PHARMACY | Facility: HOSPITAL | Age: 85
End: 2025-08-01
Payer: MEDICARE

## 2025-08-07 NOTE — DOCUMENTATION CLARIFICATION NOTE
"    PATIENT:               KEERTHI VAN  ACCT #:                  [id number]  MRN:                       [id number]  :                       1940  ADMIT DATE:       2025 12:25 AM  DISCH DATE:        2025 6:02 PM  RESPONDING PROVIDER #:        [id number]          PROVIDER RESPONSE TEXT:    Metabolic Encephalopathy 2/2 UTI    CDI QUERY TEXT:    Clarification    Instruction:    Based on your assessment of the patient and the clinical information, please provide the requested documentation by clicking on the appropriate radio button and enter any additional information if prompted.    Question: Please further clarify the type of Encephalopathy as    When answering this query, please exercise your independent professional judgment. The fact that a question is being asked, does not imply that any particular answer is desired or expected.    The patient's clinical indicators include:  Clinical Information: 84 y/o male admitted -  for sepsis and UTI.    Clinical Indicators:     H&P-  Med progress notes: \"Sepsis (UTI, leukocytosis, tachycardia, lactic acidosis, encephalopathy)\"    Treatment: IV NS infusion and bolus, IV Zosyn & Vanco, IV Ceftriaxone, oral Augmentin  Risk Factors: UTI  Options provided:  -- Metabolic Encephalopathy 2/2 UTI  -- Other Encephalopathy, Please specify type and cause  -- Other - I will add my own diagnosis  -- Refer to Clinical Documentation Reviewer    Query created by: Radha Gore on 2025 10:23 AM      Electronically signed by:  BASSAM WHIPPLE MD 2025 8:00 AM          "

## 2025-08-12 DIAGNOSIS — E11.69 TYPE 2 DIABETES MELLITUS WITH OTHER SPECIFIED COMPLICATION, WITHOUT LONG-TERM CURRENT USE OF INSULIN: ICD-10-CM

## 2025-08-12 RX ORDER — GLIPIZIDE 10 MG/1
10 TABLET, FILM COATED, EXTENDED RELEASE ORAL
Qty: 180 TABLET | Refills: 0 | OUTPATIENT
Start: 2025-08-12

## 2025-08-14 ENCOUNTER — TELEPHONE (OUTPATIENT)
Dept: PRIMARY CARE | Facility: CLINIC | Age: 85
End: 2025-08-14
Payer: MEDICARE

## 2025-08-18 ENCOUNTER — PATIENT OUTREACH (OUTPATIENT)
Dept: PRIMARY CARE | Facility: CLINIC | Age: 85
End: 2025-08-18
Payer: MEDICARE

## 2025-08-20 ENCOUNTER — TELEPHONE (OUTPATIENT)
Dept: PRIMARY CARE | Facility: CLINIC | Age: 85
End: 2025-08-20
Payer: MEDICARE

## 2025-08-21 ENCOUNTER — APPOINTMENT (OUTPATIENT)
Dept: PRIMARY CARE | Facility: CLINIC | Age: 85
End: 2025-08-21
Payer: MEDICARE

## 2025-08-27 ENCOUNTER — PATIENT OUTREACH (OUTPATIENT)
Dept: PRIMARY CARE | Facility: CLINIC | Age: 85
End: 2025-08-27
Payer: MEDICARE

## 2025-09-03 ENCOUNTER — TELEPHONE (OUTPATIENT)
Dept: PRIMARY CARE | Facility: CLINIC | Age: 85
End: 2025-09-03
Payer: MEDICARE

## 2025-09-04 ENCOUNTER — APPOINTMENT (OUTPATIENT)
Dept: PRIMARY CARE | Facility: CLINIC | Age: 85
End: 2025-09-04
Payer: MEDICARE

## 2025-09-04 ASSESSMENT — ENCOUNTER SYMPTOMS
DEPRESSION: 0
LOSS OF SENSATION IN FEET: 0
OCCASIONAL FEELINGS OF UNSTEADINESS: 1

## 2025-12-04 ENCOUNTER — APPOINTMENT (OUTPATIENT)
Dept: PRIMARY CARE | Facility: CLINIC | Age: 85
End: 2025-12-04
Payer: MEDICARE

## 2026-01-07 ENCOUNTER — APPOINTMENT (OUTPATIENT)
Dept: NEPHROLOGY | Facility: CLINIC | Age: 86
End: 2026-01-07
Payer: MEDICARE